# Patient Record
Sex: MALE | Race: WHITE | Employment: OTHER | URBAN - METROPOLITAN AREA
[De-identification: names, ages, dates, MRNs, and addresses within clinical notes are randomized per-mention and may not be internally consistent; named-entity substitution may affect disease eponyms.]

---

## 2019-09-24 ENCOUNTER — HOSPITAL ENCOUNTER (OUTPATIENT)
Dept: CT IMAGING | Age: 74
Discharge: HOME | End: 2019-09-24
Payer: MEDICARE

## 2019-09-24 ENCOUNTER — HOSPITAL ENCOUNTER (OUTPATIENT)
Age: 74
Discharge: HOME | End: 2019-09-24
Payer: MEDICARE

## 2019-09-24 DIAGNOSIS — I71.40 ABDOMINAL AORTIC ANEURYSM WITHOUT RUPTURE: ICD-10-CM

## 2019-09-24 LAB
BUN BLDV-MCNC: 11 MG/DL (ref 8–23)
CREAT SERPL-MCNC: 0.9 MG/DL (ref 0.7–1.2)
GFR AFRICAN AMERICAN: >60
GFR NON-AFRICAN AMERICAN: >60 ML/MIN/1.73

## 2019-09-24 PROCEDURE — 74174 CTA ABD&PLVS W/CONTRAST: CPT

## 2019-09-24 PROCEDURE — 82565 ASSAY OF CREATININE: CPT

## 2019-09-24 PROCEDURE — 36415 COLL VENOUS BLD VENIPUNCTURE: CPT

## 2019-09-24 PROCEDURE — 84520 ASSAY OF UREA NITROGEN: CPT

## 2019-09-24 PROCEDURE — 6360000004 HC RX CONTRAST MEDICATION: Performed by: RADIOLOGY

## 2019-09-24 RX ADMIN — IOPAMIDOL 80 ML: 755 INJECTION, SOLUTION INTRAVENOUS at 08:09

## 2021-03-02 ENCOUNTER — HOSPITAL ENCOUNTER (OUTPATIENT)
Dept: CT IMAGING | Age: 76
Discharge: HOME | End: 2021-03-02
Payer: MEDICARE

## 2021-03-02 ENCOUNTER — HOSPITAL ENCOUNTER (OUTPATIENT)
Age: 76
Discharge: HOME | End: 2021-03-02
Payer: MEDICARE

## 2021-03-02 DIAGNOSIS — I71.40 ABDOMINAL AORTIC ANEURYSM WITHOUT RUPTURE: ICD-10-CM

## 2021-03-02 LAB
BUN BLDV-MCNC: 12 MG/DL (ref 8–23)
CREAT SERPL-MCNC: 0.8 MG/DL (ref 0.7–1.2)
GFR AFRICAN AMERICAN: >60
GFR NON-AFRICAN AMERICAN: >60 ML/MIN/1.73

## 2021-03-02 PROCEDURE — 82565 ASSAY OF CREATININE: CPT

## 2021-03-02 PROCEDURE — 36415 COLL VENOUS BLD VENIPUNCTURE: CPT

## 2021-03-02 PROCEDURE — 74174 CTA ABD&PLVS W/CONTRAST: CPT

## 2021-03-02 PROCEDURE — 6360000004 HC RX CONTRAST MEDICATION: Performed by: RADIOLOGY

## 2021-03-02 PROCEDURE — 84520 ASSAY OF UREA NITROGEN: CPT

## 2021-03-02 RX ADMIN — IOPAMIDOL 75 ML: 755 INJECTION, SOLUTION INTRAVENOUS at 08:10

## 2022-04-18 ENCOUNTER — OFFICE VISIT (OUTPATIENT)
Dept: ORTHOPEDIC SURGERY | Age: 77
End: 2022-04-18
Payer: MEDICARE

## 2022-04-18 VITALS — WEIGHT: 150 LBS | HEIGHT: 66 IN | BODY MASS INDEX: 24.11 KG/M2 | TEMPERATURE: 98 F

## 2022-04-18 DIAGNOSIS — M25.561 ACUTE PAIN OF RIGHT KNEE: Primary | ICD-10-CM

## 2022-04-18 DIAGNOSIS — M17.11 PRIMARY OSTEOARTHRITIS OF RIGHT KNEE: ICD-10-CM

## 2022-04-18 PROCEDURE — 4004F PT TOBACCO SCREEN RCVD TLK: CPT | Performed by: ORTHOPAEDIC SURGERY

## 2022-04-18 PROCEDURE — 99203 OFFICE O/P NEW LOW 30 MIN: CPT | Performed by: ORTHOPAEDIC SURGERY

## 2022-04-18 PROCEDURE — G8420 CALC BMI NORM PARAMETERS: HCPCS | Performed by: ORTHOPAEDIC SURGERY

## 2022-04-18 PROCEDURE — 1123F ACP DISCUSS/DSCN MKR DOCD: CPT | Performed by: ORTHOPAEDIC SURGERY

## 2022-04-18 PROCEDURE — 20610 DRAIN/INJ JOINT/BURSA W/O US: CPT | Performed by: ORTHOPAEDIC SURGERY

## 2022-04-18 PROCEDURE — G8427 DOCREV CUR MEDS BY ELIG CLIN: HCPCS | Performed by: ORTHOPAEDIC SURGERY

## 2022-04-18 PROCEDURE — 4040F PNEUMOC VAC/ADMIN/RCVD: CPT | Performed by: ORTHOPAEDIC SURGERY

## 2022-04-18 RX ORDER — FELODIPINE 10 MG/1
TABLET, EXTENDED RELEASE ORAL
COMMUNITY
Start: 2022-03-06

## 2022-04-18 RX ORDER — TRIAMCINOLONE ACETONIDE 40 MG/ML
40 INJECTION, SUSPENSION INTRA-ARTICULAR; INTRAMUSCULAR ONCE
Status: COMPLETED | OUTPATIENT
Start: 2022-04-18 | End: 2022-04-18

## 2022-04-18 RX ORDER — CLOPIDOGREL BISULFATE 75 MG/1
TABLET ORAL
COMMUNITY
Start: 2022-03-06

## 2022-04-18 RX ORDER — OLMESARTAN MEDOXOMIL 20 MG/1
TABLET ORAL
COMMUNITY
Start: 2022-03-08

## 2022-04-18 RX ORDER — EZETIMIBE AND SIMVASTATIN 10; 20 MG/1; MG/1
TABLET ORAL
COMMUNITY
Start: 2022-03-17

## 2022-04-18 RX ADMIN — TRIAMCINOLONE ACETONIDE 40 MG: 40 INJECTION, SUSPENSION INTRA-ARTICULAR; INTRAMUSCULAR at 12:31

## 2022-04-18 NOTE — PROGRESS NOTES
Chief Complaint   Patient presents with    Knee Pain     Right Knee  x years, states of instability increasing       Subjective:     Patient ID: Laura Parnell is a 68 y.o..  male    Knee Pain  Patient complains of right knee pain. This is evaluated as a personal injury. There was not a history of injury. The pain began several years ago. The pain is located medial, lateral, anterior. He describes  His symptoms as aching and throbbing. He has experienced popping, clicking, locking, and giving way in the affected knee. The patient has had pain with kneeling, squating, and climbing stairs. Symptoms improve with rest, ice. The symptoms are worse with activity, stair climbing, kneeling, deep knee bending. The knee has given out or felt unstable. The patient cannot bend and straighten the knee fully. The patient is active in none. Treatment to date has been ice, NSAID's, without significant relief. The patient is not working. The patients occupation is retired. Patient did have a stroke 1994 that effected his right side. No past medical history on file. No past surgical history on file.     Current Outpatient Medications:     clopidogrel (PLAVIX) 75 MG tablet, TAKE 1 TABLET BY MOUTH ONCE DAILY, Disp: , Rfl:     ezetimibe-simvastatin (VYTORIN) 10-20 MG per tablet, TAKE 1 TABLET BY MOUTH ONCE DAILY, Disp: , Rfl:     felodipine (PLENDIL) 10 MG extended release tablet, TAKE 1 TABLET BY MOUTH ONCE DAILY, Disp: , Rfl:     olmesartan (BENICAR) 20 MG tablet, TAKE 1 TABLET BY MOUTH ONCE DAILY, Disp: , Rfl:   No Known Allergies  Social History     Socioeconomic History    Marital status:      Spouse name: Not on file    Number of children: Not on file    Years of education: Not on file    Highest education level: Not on file   Occupational History    Not on file   Tobacco Use    Smoking status: Not on file    Smokeless tobacco: Not on file   Substance and Sexual Activity    Alcohol use: Not on file    Drug use: Not on file    Sexual activity: Not on file   Other Topics Concern    Not on file   Social History Narrative    Not on file     Social Determinants of Health     Financial Resource Strain:     Difficulty of Paying Living Expenses: Not on file   Food Insecurity:     Worried About Running Out of Food in the Last Year: Not on file    [place] of Food in the Last Year: Not on file   Transportation Needs:     Lack of Transportation (Medical): Not on file    Lack of Transportation (Non-Medical): Not on file   Physical Activity:     Days of Exercise per Week: Not on file    Minutes of Exercise per Session: Not on file   Stress:     Feeling of Stress : Not on file   Social Connections:     Frequency of Communication with Friends and Family: Not on file    Frequency of Social Gatherings with Friends and Family: Not on file    Attends [employer] Services: Not on file    Active Member of [address] [place] [address] [employer] or Organizations: Not on file    Attends Club or Organization Meetings: Not on file    Marital Status: Not on file   Intimate Partner Violence:     Fear of Current or Ex-Partner: Not on file    Emotionally Abused: Not on file    Physically Abused: Not on file    Sexually Abused: Not on file   Housing Stability:     Unable to Pay for Housing in the Last Year: Not on file    Number of [place] in the Last Year: Not on file    Unstable Housing in the Last Year: Not on file     No family history on file. REVIEW OF SYSTEMS:     General/Constitution:  (-)weight loss, (-)fever, (-)chills, (-)weakness. Skin: (-) rash,(-) psoriasis,(-) eczema, (-)skin cancer. Musculoskeletal: (-) fractures,  (-) dislocations,(-) collagen vascular disease, (-) fibromyalgia, (-) multiple sclerosis, (-) muscular dystrophy, (-) RSD,(-) joint pain (-)swelling, (-) joint pain,swelling.   Neurologic: (-) epilepsy, (-)seizures,(-) brain tumor,(-) TIA, (-)stroke, (-)headaches, (-)Parkinson disease,(-) memory loss, (-) LOC.  Cardiovascular: (-) Chest pain, (-) swelling in legs/feet, (-) SOB, (-) cramping in legs/feet with walking. Respiratory: (-) SOB, (-) Coughing, (-) night sweats. GI: (-) nausea, (-) vomiting, (-) diarrhea, (-) blood in stool, (-) gastric ulcer. Psychiatric: (-) Depression, (-) Anxiety, (-) bipolar disease, (-) Alzheimer's Disease  Allergic/Immunologic: (-) allergies latex, (-) allergies metal, (-) skin sensitivity. Hematlogic: (-) anemia, (-) blood transfusion, (-) DVT/PE, (-) Clotting disorders    Subjective:    Constitution:  Temp 98 °F (36.7 °C)   Ht 5' 6\" (1.676 m)   Wt 150 lb (68 kg)   BMI 24.21 kg/m²     Psycihatric:  The patient is alert and oriented x 3, appears to be stated age and in no distress. Respiratory:  Respiratory effort is not labored. Patient is not gasping. Palpation of the chest reveals no tactile fremitus. Skin:  Upon inspection: the skin appears warm, dry and intact. There is  a previous scar over the affected area. There is any cellulitis, lymphedema or cutaneous lesions noted in the lower extremities. Upon palpation there is no induration noted. Neurologic:  Gait: antalgic; Motor exam of the lower extremities show ; quadriceps, hamstrings, foot dorsi and plantar flexors intact R.  5/5 and L. 5/5. Deep tendon reflexes are 2/4 at the knees and 2/4 at the ankles with strong extensor hallicus longus motor strength bilaterally. Sensory to both feet is intact to all sensory roots. Cardiovascular: The vascular exam is normal and is well perfused to distal extremities. Distal pulses DP/PT: R. 2+; L. 2+. There is cap refill noted less than two seconds in all digits. There is not edema of the bilateral lower extremities. There is not varicosities noted in the distal extremities. Lymph:  Upon palpation,  there is no lymphadenopathy noted in bilateral lower extremities.       Musculoskeletal:  Gait: antalgic; examination of the nails and digits reveal no cyanosis or clubbing. Lumbar exam:  On visual inspection, there is not deformity of the spine. full range of motion, no tenderness, palpable spasm or pain on motion. Special tests: Straight Leg Raise negative, Chong test negative. Hip exam:   Upon inspection, there is not deformity noted. Upon palpation there is not tenderness. ROM: is  full and symmetrical.   Strength: Hip Flexors 5/5; Hip Abductors 5/5; Hip Adduction 5/5. Knee exam:  Right knee exam shows;  range of motion of R. Knee is 0 to 125, and L. Knee is 0 to 130. The patient does have  pain on motion, effusion is mild, there is tenderness over the  medial, lateral, anterior, patellar region, there are not any masses, there is not ligamentous instability, there is  deformity noted. Knee exam: right positive for moderate crepitations, some mild tenderness laxity is not noted with stress. There is not a popliteal cyst. Muscle testing 4/5 right LE. R. Knee:  Lachman's negative, Anterior Drawer negative, Posterior Drawer negative  Justin's negative, Thallasy  negative,   PF grind test negative, Apprehension test negative, Patellar J sign  negative  L. Knee:  Lachman's negative, Anterior Drawer negative, Posterior Drawer negative  Justin's negative, Thallasy  negative,   PF grind test negative, Apprehension test negative,  Patellar J sign  negative    Xray Exam:  Four views the right knee were obtained.  There is no acute fracture   dislocation right knee.  There are mild degenerative changes right knee. There is no osseous lesion.  On the lateral view there is no joint effusion. No soft tissue abnormalities noted       Radiographic findings reviewed with patient    Assessment:  Encounter Diagnoses   Name Primary?  Acute pain of right knee Yes    Primary osteoarthritis of right knee        Plan:  Natural history and expected course discussed. Questions answered. Educational materials distributed.   I had a lengthy discussion with the patient regarding their diagnosis. I explained treatment options including surgical vs non surgical treatment. I reviewed in detail the risks and benefits and outlined the procedure in detail with expected outcomes and possible complications. I also discussed non surgical treatment such as injections (CSI and visco supplementation), physical therapy, topical creams and NSAID's. They have elected for conservative management at this time. I will proceed with a cortisone injection in the Right knee. Verbal and written consent was obtained for the injections. The skin was prepped with alcohol. 1mL of Kenalog 40mg and 9mL of 0.25% Marcaine was  injected to Right knee. The injection was given through the lateral side of the knee. The patient tolerated the injection well. I will see the patient back prn. At least 30 minutes was spent discussing the diagnosis and treatment options with the patient with at least 50% of the time was spent with decision making and counseling the patient.

## 2022-08-15 ENCOUNTER — OFFICE VISIT (OUTPATIENT)
Dept: ORTHOPEDIC SURGERY | Age: 77
End: 2022-08-15
Payer: MEDICARE

## 2022-08-15 VITALS — BODY MASS INDEX: 24.11 KG/M2 | TEMPERATURE: 98 F | HEIGHT: 66 IN | WEIGHT: 150 LBS

## 2022-08-15 DIAGNOSIS — M17.11 PRIMARY OSTEOARTHRITIS OF RIGHT KNEE: Primary | ICD-10-CM

## 2022-08-15 PROCEDURE — 20610 DRAIN/INJ JOINT/BURSA W/O US: CPT | Performed by: ORTHOPAEDIC SURGERY

## 2022-08-15 PROCEDURE — 99213 OFFICE O/P EST LOW 20 MIN: CPT | Performed by: ORTHOPAEDIC SURGERY

## 2022-08-15 PROCEDURE — 1123F ACP DISCUSS/DSCN MKR DOCD: CPT | Performed by: ORTHOPAEDIC SURGERY

## 2022-08-16 RX ORDER — TRIAMCINOLONE ACETONIDE 40 MG/ML
40 INJECTION, SUSPENSION INTRA-ARTICULAR; INTRAMUSCULAR ONCE
Status: COMPLETED | OUTPATIENT
Start: 2022-08-16 | End: 2022-08-16

## 2022-08-16 RX ADMIN — TRIAMCINOLONE ACETONIDE 40 MG: 40 INJECTION, SUSPENSION INTRA-ARTICULAR; INTRAMUSCULAR at 12:31

## 2022-08-16 NOTE — PROGRESS NOTES
Chief Complaint   Patient presents with    Knee Pain     Right knee pain wants cortisone injection       Subjective:     Patient ID: Andra Russell is a 68 y.o..  male    Knee Pain  Patient complains of right knee pain. He has had csi with good relief    No past medical history on file. No past surgical history on file. Current Outpatient Medications:     clopidogrel (PLAVIX) 75 MG tablet, TAKE 1 TABLET BY MOUTH ONCE DAILY, Disp: , Rfl:     ezetimibe-simvastatin (VYTORIN) 10-20 MG per tablet, TAKE 1 TABLET BY MOUTH ONCE DAILY, Disp: , Rfl:     felodipine (PLENDIL) 10 MG extended release tablet, TAKE 1 TABLET BY MOUTH ONCE DAILY, Disp: , Rfl:     olmesartan (BENICAR) 20 MG tablet, TAKE 1 TABLET BY MOUTH ONCE DAILY, Disp: , Rfl:   No Known Allergies  Social History     Socioeconomic History    Marital status:      Spouse name: Not on file    Number of children: Not on file    Years of education: Not on file    Highest education level: Not on file   Occupational History    Not on file   Tobacco Use    Smoking status: Not on file    Smokeless tobacco: Not on file   Substance and Sexual Activity    Alcohol use: Not on file    Drug use: Not on file    Sexual activity: Not on file   Other Topics Concern    Not on file   Social History Narrative    Not on file     Social Determinants of Health     Financial Resource Strain: Not on file   Food Insecurity: Not on file   Transportation Needs: Not on file   Physical Activity: Not on file   Stress: Not on file   Social Connections: Not on file   Intimate Partner Violence: Not on file   Housing Stability: Not on file     No family history on file. REVIEW OF SYSTEMS:     General/Constitution:  (-)weight loss, (-)fever, (-)chills, (-)weakness. Skin: (-) rash,(-) psoriasis,(-) eczema, (-)skin cancer.    Musculoskeletal: (-) fractures,  (-) dislocations,(-) collagen vascular disease, (-) fibromyalgia, (-) multiple sclerosis, (-) muscular dystrophy, (-) RSD,(-) joint pain (-)swelling, (-) joint pain,swelling. Neurologic: (-) epilepsy, (-)seizures,(-) brain tumor,(-) TIA, (-)stroke, (-)headaches, (-)Parkinson disease,(-) memory loss, (-) LOC. Cardiovascular: (-) Chest pain, (-) swelling in legs/feet, (-) SOB, (-) cramping in legs/feet with walking. Respiratory: (-) SOB, (-) Coughing, (-) night sweats. GI: (-) nausea, (-) vomiting, (-) diarrhea, (-) blood in stool, (-) gastric ulcer. Psychiatric: (-) Depression, (-) Anxiety, (-) bipolar disease, (-) Alzheimer's Disease  Allergic/Immunologic: (-) allergies latex, (-) allergies metal, (-) skin sensitivity. Hematlogic: (-) anemia, (-) blood transfusion, (-) DVT/PE, (-) Clotting disorders    Subjective:    Constitution:  Temp 98 °F (36.7 °C)   Ht 5' 6\" (1.676 m)   Wt 150 lb (68 kg)   BMI 24.21 kg/m²     Psycihatric:  The patient is alert and oriented x 3, appears to be stated age and in no distress. Respiratory:  Respiratory effort is not labored. Patient is not gasping. Palpation of the chest reveals no tactile fremitus. Skin:  Upon inspection: the skin appears warm, dry and intact. There is  a previous scar over the affected area. There is any cellulitis, lymphedema or cutaneous lesions noted in the lower extremities. Upon palpation there is no induration noted. Neurologic:  Gait: antalgic; Motor exam of the lower extremities show ; quadriceps, hamstrings, foot dorsi and plantar flexors intact R.  5/5 and L. 5/5. Deep tendon reflexes are 2/4 at the knees and 2/4 at the ankles with strong extensor hallicus longus motor strength bilaterally. Sensory to both feet is intact to all sensory roots. Cardiovascular: The vascular exam is normal and is well perfused to distal extremities. Distal pulses DP/PT: R. 2+; L. 2+. There is cap refill noted less than two seconds in all digits. There is not edema of the bilateral lower extremities. There is not varicosities noted in the distal extremities. Lymph:  Upon palpation,  there is no lymphadenopathy noted in bilateral lower extremities. Musculoskeletal:  Gait: antalgic; examination of the nails and digits reveal no cyanosis or clubbing. Lumbar exam:  On visual inspection, there is not deformity of the spine. full range of motion, no tenderness, palpable spasm or pain on motion. Special tests: Straight Leg Raise negative, Chong test negative. Hip exam:   Upon inspection, there is not deformity noted. Upon palpation there is not tenderness. ROM: is  full and symmetrical.   Strength: Hip Flexors 5/5; Hip Abductors 5/5; Hip Adduction 5/5. Knee exam:  Right knee exam shows;  range of motion of R. Knee is 0 to 125, and L. Knee is 0 to 130. The patient does have  pain on motion, effusion is mild, there is tenderness over the  medial, lateral, anterior, patellar region, there are not any masses, there is not ligamentous instability, there is  deformity noted. Knee exam: right positive for moderate crepitations, some mild tenderness laxity is not noted with stress. There is not a popliteal cyst. Muscle testing 4/5 right LE. R. Knee:  Lachman's negative, Anterior Drawer negative, Posterior Drawer negative  Justin's negative, Thallasy  negative,   PF grind test negative, Apprehension test negative, Patellar J sign  negative  L. Knee:  Lachman's negative, Anterior Drawer negative, Posterior Drawer negative  Justin's negative, Thallasy  negative,   PF grind test negative, Apprehension test negative,  Patellar J sign  negative    Xray Exam:  Four views the right knee were obtained. There is no acute fracture   dislocation right knee. There are mild degenerative changes right knee. There is no osseous lesion. On the lateral view there is no joint effusion. No soft tissue abnormalities noted       Radiographic findings reviewed with patient    Assessment:  Encounter Diagnoses   Name Primary?     Primary osteoarthritis of right knee Yes       Plan:  Natural history and expected course discussed. Questions answered. Educational materials distributed. I had a lengthy discussion with the patient regarding their diagnosis. I explained treatment options including surgical vs non surgical treatment. I reviewed in detail the risks and benefits and outlined the procedure in detail with expected outcomes and possible complications. I also discussed non surgical treatment such as injections (CSI and visco supplementation), physical therapy, topical creams and NSAID's. They have elected for conservative management at this time. I will proceed with a cortisone injection in the Right knee. Verbal and written consent was obtained for the injections. The skin was prepped with alcohol. 1mL of Kenalog 40mg and 9mL of 0.25% Marcaine was  injected to Right knee. The injection was given through the lateral side of the knee. The patient tolerated the injection well. I will see the patient back prn.

## 2022-11-15 ENCOUNTER — OFFICE VISIT (OUTPATIENT)
Dept: ORTHOPEDIC SURGERY | Age: 77
End: 2022-11-15

## 2022-11-15 VITALS — BODY MASS INDEX: 24.11 KG/M2 | TEMPERATURE: 98 F | WEIGHT: 150 LBS | HEIGHT: 66 IN

## 2022-11-15 DIAGNOSIS — M17.11 PRIMARY OSTEOARTHRITIS OF RIGHT KNEE: Primary | ICD-10-CM

## 2022-11-16 RX ORDER — TRIAMCINOLONE ACETONIDE 40 MG/ML
40 INJECTION, SUSPENSION INTRA-ARTICULAR; INTRAMUSCULAR ONCE
Status: SHIPPED | OUTPATIENT
Start: 2022-11-16

## 2022-11-16 NOTE — PROGRESS NOTES
Chief Complaint   Patient presents with    Knee Pain     Right knee pain wants cortisone injection today. Robb Oliver returns today for follow-up of his right knee pain. he reports this is worse than when I saw him last.  The patient's pain level is a 7/10. The previous treatment was successful. No past medical history on file. No past surgical history on file. Current Outpatient Medications:     clopidogrel (PLAVIX) 75 MG tablet, TAKE 1 TABLET BY MOUTH ONCE DAILY, Disp: , Rfl:     ezetimibe-simvastatin (VYTORIN) 10-20 MG per tablet, TAKE 1 TABLET BY MOUTH ONCE DAILY, Disp: , Rfl:     felodipine (PLENDIL) 10 MG extended release tablet, TAKE 1 TABLET BY MOUTH ONCE DAILY, Disp: , Rfl:     olmesartan (BENICAR) 20 MG tablet, TAKE 1 TABLET BY MOUTH ONCE DAILY, Disp: , Rfl:   No Known Allergies  Social History     Socioeconomic History    Marital status:      Spouse name: Not on file    Number of children: Not on file    Years of education: Not on file    Highest education level: Not on file   Occupational History    Not on file   Tobacco Use    Smoking status: Not on file    Smokeless tobacco: Not on file   Substance and Sexual Activity    Alcohol use: Not on file    Drug use: Not on file    Sexual activity: Not on file   Other Topics Concern    Not on file   Social History Narrative    Not on file     Social Determinants of Health     Financial Resource Strain: Not on file   Food Insecurity: Not on file   Transportation Needs: Not on file   Physical Activity: Not on file   Stress: Not on file   Social Connections: Not on file   Intimate Partner Violence: Not on file   Housing Stability: Not on file     No family history on file. Review of Systems:     Skin: (-) rash,(-) psoriasis,(-) eczema, (-)skin cancer.    Musculoskeletal: (-) fractures,  (-) dislocations,(-) collagen vascular disease, (-) fibromyalgia, (-) multiple sclerosis, (-) muscular dystrophy, (-) RSD,(-) joint pain (-)swelling, (-) joint pain,swelling. Neurologic: (-) epilepsy, (-)seizures,(-) brain tumor,(-) TIA, (-)stroke, (-)headaches, (-)Parkinson disease,(-) memory loss, (-) LOC. Cardiovascular: (-) Chest pain, (-) swelling in legs/feet, (-) SOB, (-) cramping in legs/feet with walking. Constitutional:  The patient is alert and oriented x 3, appears to be stated age and in no distress. Temp 98 °F (36.7 °C)   Ht 5' 6\" (1.676 m)   Wt 150 lb (68 kg)   BMI 24.21 kg/m²     Skin:  Upon inspection: the skin appears warm, dry and intact. There is not a previous scar over the affected area. There is not any cellulitis, lymphedema or cutaneous lesions noted in the lower extremities. Upon palpation there is no induration noted. Neurologic:  Gait: normal;  Motor exam of the lower extremities show ; quadriceps, hamstrings, foot dorsi and plantar flexors intact R.  5/5 and L. 5/5. Deep tendon reflexes are 2/4 at the knees and 2/4 at the ankles with strong extensor hallicus longus motor strength bilaterally. Sensory to both feet is intact to all sensory roots. Cardiovascular: The vascular exam is normal and is well perfused to distal extremities. Distal pulses DP/PT: R. 2+; L. 2+. There is cap refill noted less than two seconds in all digits. There is not edema of the bilateral lower extremities. There is not varicosities noted in the distal extremities. Lymph:  Upon palpation,  there is no lymphadenopathy noted in bilateral lower extremities. Musculoskeletal:  Gait: antalgic; examination of the nails and digits reveal no cyanosis or clubbing    Lumbar exam:  On visual inspection, there is no deformity of the spine. full range of motion, no tenderness, palpable spasm or pain on motion. Special tests: Straight Leg Raise negative, Chong testnegative. Hip exam:  Upon inspection, there is no deformity noted. Upon palpation there is not tenderness.   ROM: is   full and semetrical.   Strength: Hip Flexors 5/5; Hip Abductors 5/5; Hip Adduction 5/5. Knee exam:  Right knee exam shows;  range of motion of R. Knee is 0 to 120, and L. Knee is 0 to 125. He does have  pain on motion, effusion is mild, there is tenderness over the  medial region, there are not any masses, there is not ligamentous instability, there is  deformity noted. Knee exam: bilateral positive for moderate crepitations, some mild tenderness laxity is not noted with  stress. R. Knee:  Lachman's negative, Anterior Drawer negative, Posterior Drawer negative  Justin's positive, Thallasy  positive,   PF grind test positive, Apprehension test negative, Patellar J sign  negative  L. Knee:  Lachman's negative, Anterior Drawer negative, Posterior Drawer negative  Justin's negative, Thallasy  negative,   PF grind test negative, Apprehension test negative,  Patellar J sign  negative    Xrays:   Djd knee     MRI:   none  Radiographic findings reviewed with patient    Impression:  Encounter Diagnosis   Name Primary? Primary osteoarthritis of right knee Yes       Plan:   Natural history and expected course discussed. Questions answered. Educational materials distributed. Rest, ice, compression, and elevation (RICE) therapy. Reduction in offending activity. Patellar compression sleeve. Straight leg brace. I will proceed with a cortisone injection in the Right knee. Verbal and written consent was obtained for the injections. The skin was prepped with alcohol. 1mL of Kenalog 40mg and 9mL of 0.25% Marcaine was  injected to Right knee. The injection was given through the lateral side of the knee. The patient tolerated the injection well.  I will see the patient back 3 months

## 2023-03-16 ENCOUNTER — OFFICE VISIT (OUTPATIENT)
Dept: ORTHOPEDIC SURGERY | Age: 78
End: 2023-03-16

## 2023-03-16 VITALS — BODY MASS INDEX: 22.5 KG/M2 | WEIGHT: 140 LBS | TEMPERATURE: 98 F | HEIGHT: 66 IN

## 2023-03-16 DIAGNOSIS — M17.11 PRIMARY OSTEOARTHRITIS OF RIGHT KNEE: Primary | ICD-10-CM

## 2023-03-16 NOTE — PROGRESS NOTES
exam:  Right knee exam shows;  range of motion of R. Knee is 0 to 120, and L. Knee is 0 to 125. He does have  pain on motion, effusion is mild, there is tenderness over the  medial region, there are not any masses, there is not ligamentous instability, there is  deformity noted. Knee exam: bilateral positive for moderate crepitations, some mild tenderness laxity is not noted with  stress. R. Knee:  Lachman's negative, Anterior Drawer negative, Posterior Drawer negative  Justin's positive, Thallasy  positive,   PF grind test positive, Apprehension test negative, Patellar J sign  negative  L. Knee:  Lachman's negative, Anterior Drawer negative, Posterior Drawer negative  Justin's negative, Thallasy  negative,   PF grind test negative, Apprehension test negative,  Patellar J sign  negative    Xrays:   Djd knee     MRI:   none  Radiographic findings reviewed with patient    Impression:  Encounter Diagnosis   Name Primary? Primary osteoarthritis of right knee Yes       Plan:   Natural history and expected course discussed. Questions answered. Educational materials distributed. Rest, ice, compression, and elevation (RICE) therapy. Reduction in offending activity. Patellar compression sleeve. Straight leg brace. I will proceed with a cortisone injection in the Right knee. Verbal and written consent was obtained for the injections. The skin was prepped with alcohol. 1mL of Kenalog 40mg and 9mL of 0.25% Marcaine was  injected to Right knee. The injection was given through the lateral side of the knee. The patient tolerated the injection well.  I will see the patient back 3 months

## 2023-03-22 RX ORDER — TRIAMCINOLONE ACETONIDE 40 MG/ML
40 INJECTION, SUSPENSION INTRA-ARTICULAR; INTRAMUSCULAR ONCE
Status: COMPLETED | OUTPATIENT
Start: 2023-03-22 | End: 2023-03-22

## 2023-03-22 RX ADMIN — TRIAMCINOLONE ACETONIDE 40 MG: 40 INJECTION, SUSPENSION INTRA-ARTICULAR; INTRAMUSCULAR at 13:35

## 2023-06-19 ENCOUNTER — OFFICE VISIT (OUTPATIENT)
Dept: ORTHOPEDIC SURGERY | Age: 78
End: 2023-06-19
Payer: MEDICARE

## 2023-06-19 VITALS — BODY MASS INDEX: 22.5 KG/M2 | HEIGHT: 66 IN | TEMPERATURE: 98 F | WEIGHT: 140 LBS

## 2023-06-19 DIAGNOSIS — M17.11 PRIMARY OSTEOARTHRITIS OF RIGHT KNEE: Primary | ICD-10-CM

## 2023-06-19 PROCEDURE — 20610 DRAIN/INJ JOINT/BURSA W/O US: CPT | Performed by: ORTHOPAEDIC SURGERY

## 2023-06-19 PROCEDURE — 1123F ACP DISCUSS/DSCN MKR DOCD: CPT | Performed by: ORTHOPAEDIC SURGERY

## 2023-06-19 PROCEDURE — 99213 OFFICE O/P EST LOW 20 MIN: CPT | Performed by: ORTHOPAEDIC SURGERY

## 2023-06-19 RX ORDER — TRIAMCINOLONE ACETONIDE 40 MG/ML
40 INJECTION, SUSPENSION INTRA-ARTICULAR; INTRAMUSCULAR ONCE
Status: COMPLETED | OUTPATIENT
Start: 2023-06-19 | End: 2023-06-19

## 2023-06-19 RX ADMIN — TRIAMCINOLONE ACETONIDE 40 MG: 40 INJECTION, SUSPENSION INTRA-ARTICULAR; INTRAMUSCULAR at 10:58

## 2023-06-19 NOTE — PROGRESS NOTES
range of motion of R. Knee is 0 to 120, and L. Knee is 0 to 125. He does have  pain on motion, effusion is mild, there is tenderness over the  medial region, there are not any masses, there is not ligamentous instability, there is  deformity noted. Knee exam: bilateral positive for moderate crepitations, some mild tenderness laxity is not noted with  stress. R. Knee:  Lachman's negative, Anterior Drawer negative, Posterior Drawer negative  Justin's positive, Thallasy  positive,   PF grind test positive, Apprehension test negative, Patellar J sign  negative  L. Knee:  Lachman's negative, Anterior Drawer negative, Posterior Drawer negative  Justin's negative, Thallasy  negative,   PF grind test negative, Apprehension test negative,  Patellar J sign  negative    Xrays:   Djd knee     MRI:   none  Radiographic findings reviewed with patient    Impression:  Encounter Diagnosis   Name Primary? Primary osteoarthritis of right knee Yes       Plan:   Natural history and expected course discussed. Questions answered. Educational materials distributed. Rest, ice, compression, and elevation (RICE) therapy. Reduction in offending activity. Patellar compression sleeve. Straight leg brace. I will proceed with a cortisone injection in the Right knee. Verbal and written consent was obtained for the injections. The skin was prepped with alcohol. 1mL of Kenalog 40mg and 9mL of 0.25% Marcaine was  injected to Right knee. The injection was given through the lateral side of the knee. The patient tolerated the injection well.  I will see the patient back 3 months

## 2023-09-19 ENCOUNTER — OFFICE VISIT (OUTPATIENT)
Dept: ORTHOPEDIC SURGERY | Age: 78
End: 2023-09-19
Payer: MEDICARE

## 2023-09-19 VITALS — TEMPERATURE: 98 F | HEIGHT: 66 IN | WEIGHT: 140 LBS | BODY MASS INDEX: 22.5 KG/M2

## 2023-09-19 DIAGNOSIS — M17.11 PRIMARY OSTEOARTHRITIS OF RIGHT KNEE: Primary | ICD-10-CM

## 2023-09-19 PROCEDURE — 20610 DRAIN/INJ JOINT/BURSA W/O US: CPT | Performed by: ORTHOPAEDIC SURGERY

## 2023-09-19 PROCEDURE — 1123F ACP DISCUSS/DSCN MKR DOCD: CPT | Performed by: ORTHOPAEDIC SURGERY

## 2023-09-19 PROCEDURE — 99213 OFFICE O/P EST LOW 20 MIN: CPT | Performed by: ORTHOPAEDIC SURGERY

## 2023-09-19 RX ORDER — TRIAMCINOLONE ACETONIDE 40 MG/ML
40 INJECTION, SUSPENSION INTRA-ARTICULAR; INTRAMUSCULAR ONCE
Status: COMPLETED | OUTPATIENT
Start: 2023-09-19 | End: 2023-09-19

## 2023-09-19 RX ADMIN — TRIAMCINOLONE ACETONIDE 40 MG: 40 INJECTION, SUSPENSION INTRA-ARTICULAR; INTRAMUSCULAR at 10:10

## 2023-09-19 NOTE — PROGRESS NOTES
Chief Complaint   Patient presents with    Knee Pain     Right Knee. Pt has 4/10 pain at the time of visit. Pt has minor swelling from time to time in the Right Knee. Pt has instability with ambulation with posterior translation as described by the patient. Jeniffer Vega returns today for follow-up of his right knee pain. he reports this is worse than when I saw him last.  The patient's pain level is a 4/10. No past medical history on file. No past surgical history on file. Current Outpatient Medications:     clopidogrel (PLAVIX) 75 MG tablet, TAKE 1 TABLET BY MOUTH ONCE DAILY, Disp: , Rfl:     ezetimibe-simvastatin (VYTORIN) 10-20 MG per tablet, TAKE 1 TABLET BY MOUTH ONCE DAILY, Disp: , Rfl:     felodipine (PLENDIL) 10 MG extended release tablet, TAKE 1 TABLET BY MOUTH ONCE DAILY, Disp: , Rfl:     olmesartan (BENICAR) 20 MG tablet, TAKE 1 TABLET BY MOUTH ONCE DAILY, Disp: , Rfl:   No Known Allergies  Social History     Socioeconomic History    Marital status:      Spouse name: Not on file    Number of children: Not on file    Years of education: Not on file    Highest education level: Not on file   Occupational History    Not on file   Tobacco Use    Smoking status: Not on file    Smokeless tobacco: Not on file   Substance and Sexual Activity    Alcohol use: Not on file    Drug use: Not on file    Sexual activity: Not on file   Other Topics Concern    Not on file   Social History Narrative    Not on file     Social Determinants of Health     Financial Resource Strain: Not on file   Food Insecurity: Not on file   Transportation Needs: Not on file   Physical Activity: Not on file   Stress: Not on file   Social Connections: Not on file   Intimate Partner Violence: Not on file   Housing Stability: Not on file     No family history on file. Review of Systems:     Skin: (-) rash,(-) psoriasis,(-) eczema, (-)skin cancer.    Musculoskeletal: (-) fractures,  (-) dislocations,(-) collagen vascular

## 2024-03-19 ENCOUNTER — OFFICE VISIT (OUTPATIENT)
Dept: ORTHOPEDIC SURGERY | Age: 79
End: 2024-03-19
Payer: MEDICARE

## 2024-03-19 VITALS — BODY MASS INDEX: 22.5 KG/M2 | HEIGHT: 66 IN | TEMPERATURE: 98 F | WEIGHT: 140 LBS

## 2024-03-19 DIAGNOSIS — M17.11 PRIMARY OSTEOARTHRITIS OF RIGHT KNEE: Primary | ICD-10-CM

## 2024-03-19 PROCEDURE — 20610 DRAIN/INJ JOINT/BURSA W/O US: CPT | Performed by: ORTHOPAEDIC SURGERY

## 2024-03-19 PROCEDURE — 1123F ACP DISCUSS/DSCN MKR DOCD: CPT | Performed by: ORTHOPAEDIC SURGERY

## 2024-03-19 PROCEDURE — 99213 OFFICE O/P EST LOW 20 MIN: CPT | Performed by: ORTHOPAEDIC SURGERY

## 2024-03-19 RX ORDER — TRIAMCINOLONE ACETONIDE 40 MG/ML
40 INJECTION, SUSPENSION INTRA-ARTICULAR; INTRAMUSCULAR ONCE
Status: COMPLETED | OUTPATIENT
Start: 2024-03-19 | End: 2024-03-19

## 2024-03-19 RX ADMIN — TRIAMCINOLONE ACETONIDE 40 MG: 40 INJECTION, SUSPENSION INTRA-ARTICULAR; INTRAMUSCULAR at 09:08

## 2024-03-19 NOTE — PROGRESS NOTES
semetrical.   Strength: Hip Flexors 5/5; Hip Abductors 5/5; Hip Adduction 5/5.     Knee exam:  Right knee exam shows;  range of motion of R. Knee is 0 to 120, and L. Knee is 0 to 125. He does have  pain on motion, effusion is mild, there is tenderness over the  medial region, there are not any masses, there is not ligamentous instability, there is  deformity noted. Knee exam: bilateral positive for moderate crepitations, some mild tenderness laxity is not noted with  stress.      R. Knee:  Lachman's negative, Anterior Drawer negative, Posterior Drawer negative  Justin's positive, Thallasy  positive,   PF grind test positive, Apprehension test negative, Patellar J sign  negative  L. Knee:  Lachman's negative, Anterior Drawer negative, Posterior Drawer negative  Justin's negative, Thallasy  negative,   PF grind test negative, Apprehension test negative,  Patellar J sign  negative    Xrays:   Djd knee     MRI:   none  Radiographic findings reviewed with patient    Impression:  Encounter Diagnosis   Name Primary?    Primary osteoarthritis of right knee Yes           Plan:   I had a lengthy discussion with the patient regarding their diagnosis. I explained treatment options including surgical vs non surgical treatment. I reviewed in detail the risks and benefits and outlined the procedure in detail with expected outcomes and possible complications.  I also discussed non surgical treatment such as injections (CSI and visco supplementation), physical therapy, topical creams and NSAID's. They have elected for conservative management at this time.        I will proceed with a cortisone injection in the Right knee.  Verbal and written consent was obtained for the injections. The skin was prepped with alcohol. 1mL of Kenalog 40mg and 9mL of 0.25% Marcaine was  injected to Right knee. The injection was given through the lateral side of the knee. The patient tolerated the injection well. I will see the patient back 3 months

## 2024-06-20 ENCOUNTER — OFFICE VISIT (OUTPATIENT)
Dept: ORTHOPEDIC SURGERY | Age: 79
End: 2024-06-20
Payer: MEDICARE

## 2024-06-20 VITALS — BODY MASS INDEX: 22.5 KG/M2 | HEIGHT: 66 IN | TEMPERATURE: 98 F | WEIGHT: 140 LBS

## 2024-06-20 DIAGNOSIS — M17.11 PRIMARY OSTEOARTHRITIS OF RIGHT KNEE: Primary | ICD-10-CM

## 2024-06-20 PROCEDURE — 99213 OFFICE O/P EST LOW 20 MIN: CPT | Performed by: ORTHOPAEDIC SURGERY

## 2024-06-20 PROCEDURE — 20610 DRAIN/INJ JOINT/BURSA W/O US: CPT | Performed by: ORTHOPAEDIC SURGERY

## 2024-06-20 PROCEDURE — 1123F ACP DISCUSS/DSCN MKR DOCD: CPT | Performed by: ORTHOPAEDIC SURGERY

## 2024-06-28 ENCOUNTER — TRANSCRIBE ORDERS (OUTPATIENT)
Dept: ADMINISTRATIVE | Age: 79
End: 2024-06-28

## 2024-06-28 DIAGNOSIS — I71.40 ABDOMINAL AORTIC ANEURYSM (AAA) WITHOUT RUPTURE, UNSPECIFIED PART (HCC): Primary | ICD-10-CM

## 2024-06-28 RX ORDER — TRIAMCINOLONE ACETONIDE 40 MG/ML
40 INJECTION, SUSPENSION INTRA-ARTICULAR; INTRAMUSCULAR ONCE
Status: COMPLETED | OUTPATIENT
Start: 2024-06-28 | End: 2024-06-28

## 2024-06-28 RX ADMIN — TRIAMCINOLONE ACETONIDE 40 MG: 40 INJECTION, SUSPENSION INTRA-ARTICULAR; INTRAMUSCULAR at 13:38

## 2024-06-28 NOTE — PROGRESS NOTES
Chief Complaint   Patient presents with    Knee Pain     Right knee 3 month f/u       Kwankey Dakin returns today for follow-up of his right knee pain. He has difficulties performing ADL's.      No past medical history on file.  No past surgical history on file.    Current Outpatient Medications:     clopidogrel (PLAVIX) 75 MG tablet, TAKE 1 TABLET BY MOUTH ONCE DAILY, Disp: , Rfl:     ezetimibe-simvastatin (VYTORIN) 10-20 MG per tablet, TAKE 1 TABLET BY MOUTH ONCE DAILY, Disp: , Rfl:     felodipine (PLENDIL) 10 MG extended release tablet, TAKE 1 TABLET BY MOUTH ONCE DAILY, Disp: , Rfl:     olmesartan (BENICAR) 20 MG tablet, TAKE 1 TABLET BY MOUTH ONCE DAILY, Disp: , Rfl:   No Known Allergies  Social History     Socioeconomic History    Marital status:      Spouse name: Not on file    Number of children: Not on file    Years of education: Not on file    Highest education level: Not on file   Occupational History    Not on file   Tobacco Use    Smoking status: Not on file    Smokeless tobacco: Not on file   Substance and Sexual Activity    Alcohol use: Not on file    Drug use: Not on file    Sexual activity: Not on file   Other Topics Concern    Not on file   Social History Narrative    Not on file     Social Determinants of Health     Financial Resource Strain: Not on file   Food Insecurity: Not on file   Transportation Needs: Not on file   Physical Activity: Not on file   Stress: Not on file   Social Connections: Not on file   Intimate Partner Violence: Not on file   Housing Stability: Not on file     No family history on file.    Review of Systems:     Skin: (-) rash,(-) psoriasis,(-) eczema, (-)skin cancer.   Musculoskeletal: (-) fractures,  (-) dislocations,(-) collagen vascular disease, (-) fibromyalgia, (-) multiple sclerosis, (-) muscular dystrophy, (-) RSD,(-) joint pain (-)swelling, (-) joint pain,swelling.  Neurologic: (-) epilepsy, (-)seizures,(-) brain tumor,(-) TIA, (-)stroke, (-)headaches,

## 2024-08-05 ENCOUNTER — HOSPITAL ENCOUNTER (OUTPATIENT)
Age: 79
Discharge: HOME | End: 2024-08-05
Payer: MEDICARE

## 2024-08-05 LAB
BUN SERPL-MCNC: 6 MG/DL (ref 6–23)
CREAT SERPL-MCNC: 0.6 MG/DL (ref 0.7–1.2)
GFR, ESTIMATED: >90 ML/MIN/1.73M2

## 2024-08-05 PROCEDURE — 84520 ASSAY OF UREA NITROGEN: CPT

## 2024-08-05 PROCEDURE — 82565 ASSAY OF CREATININE: CPT

## 2024-08-05 PROCEDURE — 36415 COLL VENOUS BLD VENIPUNCTURE: CPT

## 2024-08-09 ENCOUNTER — HOSPITAL ENCOUNTER (OUTPATIENT)
Dept: CT IMAGING | Age: 79
Discharge: HOME | End: 2024-08-09
Attending: THORACIC SURGERY (CARDIOTHORACIC VASCULAR SURGERY)
Payer: MEDICARE

## 2024-08-09 DIAGNOSIS — I71.40 ABDOMINAL AORTIC ANEURYSM (AAA) WITHOUT RUPTURE, UNSPECIFIED PART (HCC): ICD-10-CM

## 2024-08-09 PROCEDURE — 6360000004 HC RX CONTRAST MEDICATION: Performed by: RADIOLOGY

## 2024-08-09 PROCEDURE — 74174 CTA ABD&PLVS W/CONTRAST: CPT

## 2024-08-09 RX ADMIN — IOPAMIDOL 75 ML: 755 INJECTION, SOLUTION INTRAVENOUS at 08:22

## 2024-09-23 ENCOUNTER — OFFICE VISIT (OUTPATIENT)
Dept: ORTHOPEDIC SURGERY | Age: 79
End: 2024-09-23
Payer: MEDICARE

## 2024-09-23 VITALS — WEIGHT: 140 LBS | BODY MASS INDEX: 22.5 KG/M2 | HEIGHT: 66 IN

## 2024-09-23 DIAGNOSIS — M17.11 PRIMARY OSTEOARTHRITIS OF RIGHT KNEE: Primary | ICD-10-CM

## 2024-09-23 PROCEDURE — 1123F ACP DISCUSS/DSCN MKR DOCD: CPT | Performed by: ORTHOPAEDIC SURGERY

## 2024-09-23 PROCEDURE — 99213 OFFICE O/P EST LOW 20 MIN: CPT | Performed by: ORTHOPAEDIC SURGERY

## 2024-09-23 PROCEDURE — 20610 DRAIN/INJ JOINT/BURSA W/O US: CPT | Performed by: ORTHOPAEDIC SURGERY

## 2024-09-23 RX ORDER — TRIAMCINOLONE ACETONIDE 40 MG/ML
40 INJECTION, SUSPENSION INTRA-ARTICULAR; INTRAMUSCULAR ONCE
Status: COMPLETED | OUTPATIENT
Start: 2024-09-23 | End: 2024-09-23

## 2024-09-23 RX ADMIN — TRIAMCINOLONE ACETONIDE 40 MG: 40 INJECTION, SUSPENSION INTRA-ARTICULAR; INTRAMUSCULAR at 20:07

## 2024-10-18 ENCOUNTER — APPOINTMENT (OUTPATIENT)
Dept: VASCULAR SURGERY | Facility: HOSPITAL | Age: 79
End: 2024-10-18

## 2024-10-25 ENCOUNTER — OFFICE VISIT (OUTPATIENT)
Dept: VASCULAR SURGERY | Facility: HOSPITAL | Age: 79
End: 2024-10-25
Payer: COMMERCIAL

## 2024-10-25 VITALS
DIASTOLIC BLOOD PRESSURE: 81 MMHG | HEIGHT: 66 IN | WEIGHT: 124.2 LBS | BODY MASS INDEX: 19.96 KG/M2 | HEART RATE: 91 BPM | SYSTOLIC BLOOD PRESSURE: 119 MMHG | OXYGEN SATURATION: 90 %

## 2024-10-25 DIAGNOSIS — I71.40 ABDOMINAL AORTIC ANEURYSM (AAA), UNSPECIFIED PART, UNSPECIFIED WHETHER RUPTURED (CMS-HCC): Primary | ICD-10-CM

## 2024-10-25 PROCEDURE — 99205 OFFICE O/P NEW HI 60 MIN: CPT | Performed by: SURGERY

## 2024-10-25 PROCEDURE — 99215 OFFICE O/P EST HI 40 MIN: CPT | Performed by: SURGERY

## 2024-10-25 PROCEDURE — 1159F MED LIST DOCD IN RCRD: CPT | Performed by: SURGERY

## 2024-10-25 RX ORDER — OLMESARTAN MEDOXOMIL 20 MG/1
1 TABLET ORAL
COMMUNITY
Start: 2024-10-16

## 2024-10-25 RX ORDER — EZETIMIBE AND SIMVASTATIN 10; 20 MG/1; MG/1
1 TABLET ORAL
COMMUNITY
Start: 2024-09-10

## 2024-10-25 RX ORDER — CLOPIDOGREL BISULFATE 75 MG/1
1 TABLET ORAL
COMMUNITY
Start: 2024-10-08

## 2024-10-25 RX ORDER — TRIAMCINOLONE ACETONIDE 40 MG/ML
40 INJECTION, SUSPENSION INTRA-ARTICULAR; INTRAMUSCULAR
COMMUNITY
Start: 2022-11-16

## 2024-10-25 RX ORDER — FELODIPINE 10 MG/1
1 TABLET, EXTENDED RELEASE ORAL
COMMUNITY
Start: 2024-08-27

## 2024-10-25 ASSESSMENT — ENCOUNTER SYMPTOMS
LOSS OF SENSATION IN FEET: 0
OCCASIONAL FEELINGS OF UNSTEADINESS: 0
DEPRESSION: 0

## 2024-10-25 NOTE — PROGRESS NOTES
Vascular Surgery Consult/Clinic Note    CC: AAA    HPI:  Mickey Dakin is 79 y.o. male with remote CVA and known infrarenal AAA who presents as a referral from Dr. Valera.     Reports knowing about AAA for over 20 years. Believes it measures somewhere in the mid 4cm range but is not sure. Also believes there is another aneurysm going into the right leg but he is not sure. No claudication, rest pain, or wounds. Reports exertional SOB but denies previously reporting this to any physician. Denies chest pain at rest or with exertion. No prior surgeries. No family history of aneurysms or sudden death. He is a smoker for many years, currently 1/2ppd, has not tried or thought about quitting smoking.     Reported 5.3cm infrarenal AAA. However, images are not available in PACS.      Meds:   Current Outpatient Medications on File Prior to Visit   Medication Sig Dispense Refill    clopidogrel (Plavix) 75 mg tablet Take 1 tablet (75 mg) by mouth early in the morning..      ezetimibe-simvastatin (Vytorin) 10-20 mg tablet Take 1 tablet by mouth early in the morning..      felodipine ER (Plendil) 10 mg 24 hr tablet Take 1 tablet (10 mg) by mouth early in the morning..      olmesartan (BENIcar) 20 mg tablet Take 1 tablet (20 mg) by mouth early in the morning..      triamcinolone acetonide (Kenalog-40) 40 mg/mL injection Inject 1 mL (40 mg) into the joint.       No current facility-administered medications on file prior to visit.        Allergies:   No Known Allergies    SH:    Social Drivers of Health     Tobacco Use: High Risk (10/25/2024)    Patient History     Smoking Tobacco Use: Every Day     Smokeless Tobacco Use: Never     Passive Exposure: Not on file   Alcohol Use: Not on file   Financial Resource Strain: Not on file   Food Insecurity: Not on file   Transportation Needs: Not on file   Physical Activity: Not on file   Stress: Not on file   Social Connections: Not on file   Intimate Partner Violence: Not on file    Depression: Not on file   Housing Stability: Not on file   Utilities: Not on file   Digital Equity: Not on file   Health Literacy: Not on file        FH:  No family history on file.     ROS:  Review of Systems   A complete, 10-point review of systems was completed and negative except as noted above.       Objective:  Vitals:  Vitals:    10/25/24 1220   BP: 119/81   Pulse:    SpO2:         Exam:  Constitutional: no acute distress  Neuro: awake, alert, oriented; no gross deficits noted   HEENT: No deformities, no scleral icterus   Cardiac: regular rate  Pulmonary: unlabored respirations on room air  Abdomen: soft, non-tender, non-distended  Skin: warm and dry; wounds: none  Extremities: no peripheral edema noted  MSK: motor and sensory intact in all extremities  Vascular:   Radial: R: palpable. L: palpable.   DP: R: palpable. L: palpable.      Imaging:  Unable to view, requested through radiology site    Assessment & Plan:  Mickey Dakin is 79 y.o. male with history of remote CVA, exertional SOB not otherwise worked up, and known infrarenal AAA who presents as a referral from Dr. Valera. Per report AAA is 5.3cm. However, we are unable to view these images. The request form has been submitted.     - will await CT A/P images to view in PACS for further plans  - takes Plavix and statin, continue   - if repair indicated will need cardiac and carotid evaluation     Malcolm Orellana MD, PhD  Vascular Surgery, PGY3     I saw and evaluated the patient. I personally obtained the key and critical portions of the history and physical exam or was physically present for key and critical portions performed by the resident/fellow. I reviewed the resident/fellow's documentation and discussed the patient with the resident/fellow. I agree with the resident/fellow's medical decision making as documented in the note.    Uriel Arias MD  Professor & Chief, Division of Vascular Surgery & Endovascular Therapy

## 2024-11-06 ENCOUNTER — HOSPITAL ENCOUNTER (OUTPATIENT)
Dept: RADIOLOGY | Facility: EXTERNAL LOCATION | Age: 79
Discharge: HOME | End: 2024-11-06

## 2024-11-06 DIAGNOSIS — I71.40 ABDOMINAL AORTIC ANEURYSM (AAA), UNSPECIFIED PART, UNSPECIFIED WHETHER RUPTURED (CMS-HCC): Primary | ICD-10-CM

## 2024-11-11 DIAGNOSIS — I71.40 ABDOMINAL AORTIC ANEURYSM (AAA), UNSPECIFIED PART, UNSPECIFIED WHETHER RUPTURED (CMS-HCC): Primary | ICD-10-CM

## 2024-11-29 ENCOUNTER — HOSPITAL ENCOUNTER (OUTPATIENT)
Dept: RADIOLOGY | Facility: HOSPITAL | Age: 79
Discharge: HOME | End: 2024-11-29
Payer: COMMERCIAL

## 2024-11-29 ENCOUNTER — APPOINTMENT (OUTPATIENT)
Dept: RADIOLOGY | Facility: HOSPITAL | Age: 79
End: 2024-11-29
Payer: COMMERCIAL

## 2024-11-29 ENCOUNTER — HOSPITAL ENCOUNTER (OUTPATIENT)
Dept: CARDIOLOGY | Facility: HOSPITAL | Age: 79
Discharge: HOME | End: 2024-11-29
Payer: COMMERCIAL

## 2024-11-29 DIAGNOSIS — I71.40 ABDOMINAL AORTIC ANEURYSM (AAA), UNSPECIFIED PART, UNSPECIFIED WHETHER RUPTURED (CMS-HCC): ICD-10-CM

## 2024-11-29 PROCEDURE — 93017 CV STRESS TEST TRACING ONLY: CPT

## 2024-11-29 PROCEDURE — 93018 CV STRESS TEST I&R ONLY: CPT | Performed by: INTERNAL MEDICINE

## 2024-11-29 PROCEDURE — 93016 CV STRESS TEST SUPVJ ONLY: CPT | Performed by: INTERNAL MEDICINE

## 2024-11-29 PROCEDURE — 3430000001 HC RX 343 DIAGNOSTIC RADIOPHARMACEUTICALS: Performed by: INTERNAL MEDICINE

## 2024-11-29 PROCEDURE — 78452 HT MUSCLE IMAGE SPECT MULT: CPT

## 2024-11-29 PROCEDURE — A9502 TC99M TETROFOSMIN: HCPCS | Performed by: INTERNAL MEDICINE

## 2024-11-29 PROCEDURE — 2500000004 HC RX 250 GENERAL PHARMACY W/ HCPCS (ALT 636 FOR OP/ED): Performed by: NURSE PRACTITIONER

## 2024-11-29 RX ORDER — REGADENOSON 0.08 MG/ML
0.4 INJECTION, SOLUTION INTRAVENOUS ONCE
Status: COMPLETED | OUTPATIENT
Start: 2024-11-29 | End: 2024-11-29

## 2024-12-02 DIAGNOSIS — I71.40 ABDOMINAL AORTIC ANEURYSM (AAA) WITHOUT RUPTURE, UNSPECIFIED PART (CMS-HCC): ICD-10-CM

## 2024-12-02 DIAGNOSIS — I25.10 CORONARY ARTERY DISEASE INVOLVING NATIVE HEART, UNSPECIFIED VESSEL OR LESION TYPE, UNSPECIFIED WHETHER ANGINA PRESENT: Primary | ICD-10-CM

## 2024-12-02 DIAGNOSIS — Z01.810 PREOPERATIVE CARDIOVASCULAR EXAMINATION: ICD-10-CM

## 2024-12-03 ENCOUNTER — TELEPHONE (OUTPATIENT)
Dept: CARDIOLOGY | Facility: CLINIC | Age: 79
End: 2024-12-03
Payer: COMMERCIAL

## 2024-12-03 DIAGNOSIS — I71.40 ABDOMINAL AORTIC ANEURYSM (AAA) WITHOUT RUPTURE (CMS-HCC): ICD-10-CM

## 2024-12-03 DIAGNOSIS — Z01.810 PREOPERATIVE CARDIOVASCULAR EXAMINATION: ICD-10-CM

## 2024-12-03 DIAGNOSIS — I25.10 CORONARY ARTERY DISEASE INVOLVING NATIVE HEART: Primary | ICD-10-CM

## 2024-12-03 LAB
ANION GAP SERPL CALCULATED.3IONS-SCNC: 6 MMOL/L (ref 7–16)
BUN SERPL-MCNC: 10 MG/DL (ref 6–23)
CALCIUM SERPL-MCNC: 9.7 MG/DL (ref 8.6–10.2)
CHLORIDE SERPL-SCNC: 95 MMOL/L (ref 98–107)
CO2 SERPL-SCNC: 31 MMOL/L (ref 22–29)
CREAT SERPL-MCNC: 0.7 MG/DL (ref 0.7–1.2)
ERYTHROCYTE [DISTWIDTH] IN BLOOD BY AUTOMATED COUNT: 12.6 % (ref 11.5–15)
GFR, ESTIMATED: >90 ML/MIN/1.73M2
GLUCOSE SERPL-MCNC: 95 MG/DL (ref 74–99)
HCT VFR BLD AUTO: 45.4 % (ref 37–54)
HGB BLD-MCNC: 15.2 G/DL (ref 12.5–16.5)
INR PPP: 1
MCH RBC QN AUTO: 33 PG (ref 26–35)
MCHC RBC AUTO-ENTMCNC: 33.5 G/DL (ref 32–34.5)
MCV RBC AUTO: 98.5 FL (ref 80–99.9)
PLATELET # BLD AUTO: 394 K/UL (ref 130–450)
PMV BLD AUTO: 9 FL (ref 7–12)
POTASSIUM SERPL-SCNC: 5.8 MMOL/L (ref 3.5–5)
PROTHROMBIN TIME: 10.6 SEC (ref 9.3–12.4)
RBC # BLD AUTO: 4.61 M/UL (ref 3.8–5.8)
SODIUM SERPL-SCNC: 132 MMOL/L (ref 132–146)
WBC OTHER # BLD: 9.6 K/UL (ref 4.5–11.5)

## 2024-12-05 ENCOUNTER — PHARMACY VISIT (OUTPATIENT)
Dept: PHARMACY | Facility: CLINIC | Age: 79
End: 2024-12-05
Payer: COMMERCIAL

## 2024-12-05 ENCOUNTER — HOSPITAL ENCOUNTER (OUTPATIENT)
Facility: HOSPITAL | Age: 79
Setting detail: OUTPATIENT SURGERY
Discharge: HOME | End: 2024-12-05
Attending: INTERNAL MEDICINE | Admitting: INTERNAL MEDICINE
Payer: COMMERCIAL

## 2024-12-05 VITALS
HEART RATE: 78 BPM | WEIGHT: 125 LBS | DIASTOLIC BLOOD PRESSURE: 55 MMHG | OXYGEN SATURATION: 94 % | TEMPERATURE: 97 F | HEIGHT: 66 IN | SYSTOLIC BLOOD PRESSURE: 121 MMHG | RESPIRATION RATE: 14 BRPM | BODY MASS INDEX: 20.09 KG/M2

## 2024-12-05 DIAGNOSIS — I25.84 CORONARY ATHEROSCLEROSIS DUE TO CALCIFIED CORONARY LESION (CODE): ICD-10-CM

## 2024-12-05 DIAGNOSIS — I20.0 UNSTABLE ANGINA (MULTI): ICD-10-CM

## 2024-12-05 DIAGNOSIS — R93.1 ABNORMAL FINDINGS ON DIAGNOSTIC IMAGING OF HEART AND CORONARY CIRCULATION: ICD-10-CM

## 2024-12-05 DIAGNOSIS — I25.10 CORONARY ARTERY DISEASE INVOLVING NATIVE HEART, UNSPECIFIED VESSEL OR LESION TYPE, UNSPECIFIED WHETHER ANGINA PRESENT: Primary | ICD-10-CM

## 2024-12-05 DIAGNOSIS — I71.40 ABDOMINAL AORTIC ANEURYSM (AAA) WITHOUT RUPTURE, UNSPECIFIED PART (CMS-HCC): ICD-10-CM

## 2024-12-05 DIAGNOSIS — Z01.810 PREOPERATIVE CARDIOVASCULAR EXAMINATION: ICD-10-CM

## 2024-12-05 DIAGNOSIS — I71.43 INFRARENAL ABDOMINAL AORTIC ANEURYSM, WITHOUT RUPTURE (CMS-HCC): ICD-10-CM

## 2024-12-05 LAB
ANION GAP SERPL CALC-SCNC: 10 MMOL/L (ref 10–20)
BUN SERPL-MCNC: 9 MG/DL (ref 6–23)
CALCIUM SERPL-MCNC: 9.6 MG/DL (ref 8.6–10.3)
CHLORIDE SERPL-SCNC: 99 MMOL/L (ref 98–107)
CO2 SERPL-SCNC: 31 MMOL/L (ref 21–32)
CREAT SERPL-MCNC: 0.55 MG/DL (ref 0.5–1.3)
EGFRCR SERPLBLD CKD-EPI 2021: >90 ML/MIN/1.73M*2
GLUCOSE SERPL-MCNC: 98 MG/DL (ref 74–99)
POTASSIUM SERPL-SCNC: 4.8 MMOL/L (ref 3.5–5.3)
SODIUM SERPL-SCNC: 135 MMOL/L (ref 136–145)

## 2024-12-05 PROCEDURE — 7100000010 HC PHASE TWO TIME - EACH INCREMENTAL 1 MINUTE: Performed by: INTERNAL MEDICINE

## 2024-12-05 PROCEDURE — C1894 INTRO/SHEATH, NON-LASER: HCPCS | Performed by: INTERNAL MEDICINE

## 2024-12-05 PROCEDURE — 99153 MOD SED SAME PHYS/QHP EA: CPT | Performed by: INTERNAL MEDICINE

## 2024-12-05 PROCEDURE — 99152 MOD SED SAME PHYS/QHP 5/>YRS: CPT | Performed by: INTERNAL MEDICINE

## 2024-12-05 PROCEDURE — 93458 L HRT ARTERY/VENTRICLE ANGIO: CPT | Performed by: INTERNAL MEDICINE

## 2024-12-05 PROCEDURE — RXMED WILLOW AMBULATORY MEDICATION CHARGE

## 2024-12-05 PROCEDURE — 2500000005 HC RX 250 GENERAL PHARMACY W/O HCPCS: Performed by: INTERNAL MEDICINE

## 2024-12-05 PROCEDURE — 36415 COLL VENOUS BLD VENIPUNCTURE: CPT | Performed by: NURSE PRACTITIONER

## 2024-12-05 PROCEDURE — 2720000007 HC OR 272 NO HCPCS: Performed by: INTERNAL MEDICINE

## 2024-12-05 PROCEDURE — C1760 CLOSURE DEV, VASC: HCPCS | Performed by: INTERNAL MEDICINE

## 2024-12-05 PROCEDURE — C1769 GUIDE WIRE: HCPCS | Performed by: INTERNAL MEDICINE

## 2024-12-05 PROCEDURE — 93454 CORONARY ARTERY ANGIO S&I: CPT | Performed by: INTERNAL MEDICINE

## 2024-12-05 PROCEDURE — 99223 1ST HOSP IP/OBS HIGH 75: CPT | Performed by: NURSE PRACTITIONER

## 2024-12-05 PROCEDURE — 2500000004 HC RX 250 GENERAL PHARMACY W/ HCPCS (ALT 636 FOR OP/ED): Performed by: INTERNAL MEDICINE

## 2024-12-05 PROCEDURE — 96373 THER/PROPH/DIAG INJ IA: CPT | Performed by: INTERNAL MEDICINE

## 2024-12-05 PROCEDURE — 80048 BASIC METABOLIC PNL TOTAL CA: CPT | Performed by: NURSE PRACTITIONER

## 2024-12-05 PROCEDURE — 7100000009 HC PHASE TWO TIME - INITIAL BASE CHARGE: Performed by: INTERNAL MEDICINE

## 2024-12-05 PROCEDURE — 2550000001 HC RX 255 CONTRASTS: Performed by: INTERNAL MEDICINE

## 2024-12-05 RX ORDER — MIDAZOLAM HYDROCHLORIDE 1 MG/ML
INJECTION, SOLUTION INTRAMUSCULAR; INTRAVENOUS AS NEEDED
Status: DISCONTINUED | OUTPATIENT
Start: 2024-12-05 | End: 2024-12-05 | Stop reason: HOSPADM

## 2024-12-05 RX ORDER — FENTANYL CITRATE 50 UG/ML
INJECTION, SOLUTION INTRAMUSCULAR; INTRAVENOUS AS NEEDED
Status: DISCONTINUED | OUTPATIENT
Start: 2024-12-05 | End: 2024-12-05 | Stop reason: HOSPADM

## 2024-12-05 RX ORDER — CLOPIDOGREL BISULFATE 75 MG/1
75 TABLET ORAL ONCE
Status: DISCONTINUED | OUTPATIENT
Start: 2024-12-05 | End: 2024-12-09 | Stop reason: HOSPADM

## 2024-12-05 RX ORDER — NAPROXEN SODIUM 220 MG/1
324 TABLET, FILM COATED ORAL ONCE
Status: DISCONTINUED | OUTPATIENT
Start: 2024-12-05 | End: 2024-12-09 | Stop reason: HOSPADM

## 2024-12-05 RX ORDER — HEPARIN SODIUM 1000 [USP'U]/ML
INJECTION, SOLUTION INTRAVENOUS; SUBCUTANEOUS AS NEEDED
Status: DISCONTINUED | OUTPATIENT
Start: 2024-12-05 | End: 2024-12-05 | Stop reason: HOSPADM

## 2024-12-05 RX ORDER — ACETAMINOPHEN 650 MG/1
650 SUPPOSITORY RECTAL EVERY 6 HOURS PRN
Status: DISCONTINUED | OUTPATIENT
Start: 2024-12-05 | End: 2024-12-09 | Stop reason: HOSPADM

## 2024-12-05 RX ORDER — NITROGLYCERIN 40 MG/100ML
INJECTION INTRAVENOUS AS NEEDED
Status: DISCONTINUED | OUTPATIENT
Start: 2024-12-05 | End: 2024-12-05 | Stop reason: HOSPADM

## 2024-12-05 RX ORDER — EZETIMIBE 10 MG/1
10 TABLET ORAL DAILY
Qty: 90 TABLET | Refills: 0 | Status: SHIPPED | OUTPATIENT
Start: 2024-12-05

## 2024-12-05 RX ORDER — ACETAMINOPHEN 325 MG/1
650 TABLET ORAL EVERY 6 HOURS PRN
Status: DISCONTINUED | OUTPATIENT
Start: 2024-12-05 | End: 2024-12-09 | Stop reason: HOSPADM

## 2024-12-05 RX ORDER — ACETAMINOPHEN 160 MG/5ML
650 SOLUTION ORAL EVERY 6 HOURS PRN
Status: DISCONTINUED | OUTPATIENT
Start: 2024-12-05 | End: 2024-12-09 | Stop reason: HOSPADM

## 2024-12-05 RX ORDER — ROSUVASTATIN CALCIUM 40 MG/1
40 TABLET, COATED ORAL DAILY
Qty: 90 TABLET | Refills: 0 | Status: SHIPPED | OUTPATIENT
Start: 2024-12-05

## 2024-12-05 RX ORDER — ASPIRIN 81 MG/1
81 TABLET ORAL DAILY
COMMUNITY

## 2024-12-05 RX ORDER — LIDOCAINE HYDROCHLORIDE 10 MG/ML
INJECTION, SOLUTION EPIDURAL; INFILTRATION; INTRACAUDAL; PERINEURAL AS NEEDED
Status: DISCONTINUED | OUTPATIENT
Start: 2024-12-05 | End: 2024-12-05 | Stop reason: HOSPADM

## 2024-12-05 ASSESSMENT — PAIN - FUNCTIONAL ASSESSMENT
PAIN_FUNCTIONAL_ASSESSMENT: 0-10

## 2024-12-05 ASSESSMENT — ENCOUNTER SYMPTOMS
NEUROLOGICAL NEGATIVE: 1
CARDIOVASCULAR NEGATIVE: 1
RESPIRATORY NEGATIVE: 1
EYES NEGATIVE: 1
GASTROINTESTINAL NEGATIVE: 1
HEMATOLOGIC/LYMPHATIC NEGATIVE: 1
FATIGUE: 1
ALLERGIC/IMMUNOLOGIC NEGATIVE: 1
MUSCULOSKELETAL NEGATIVE: 1
ENDOCRINE NEGATIVE: 1
PSYCHIATRIC NEGATIVE: 1

## 2024-12-05 ASSESSMENT — COLUMBIA-SUICIDE SEVERITY RATING SCALE - C-SSRS
1. IN THE PAST MONTH, HAVE YOU WISHED YOU WERE DEAD OR WISHED YOU COULD GO TO SLEEP AND NOT WAKE UP?: NO
2. HAVE YOU ACTUALLY HAD ANY THOUGHTS OF KILLING YOURSELF?: NO
6. HAVE YOU EVER DONE ANYTHING, STARTED TO DO ANYTHING, OR PREPARED TO DO ANYTHING TO END YOUR LIFE?: NO

## 2024-12-05 ASSESSMENT — PAIN SCALES - GENERAL: PAINLEVEL_OUTOF10: 0 - NO PAIN

## 2024-12-05 NOTE — H&P
History Of Present Illness  Mickey Dakin is a 79 y.o. male presenting with infrarenal AAA, here for [facility] as presurgical evaluation. PMH includes AAA for over 20 years and tobacco user.    Past Medical History:  History reviewed. No pertinent past medical history.     Past Surgical History:  History reviewed. No pertinent surgical history.       Social History:   reports that he has quit smoking. His smoking use included cigarettes. He started smoking about 59 years ago. He has a 30 pack-year smoking history. He has never used smokeless tobacco. He reports that he does not drink alcohol and does not use drugs.     Family History:  No family history on file.     Allergies:  No Known Allergies     Home Medications:  Current Outpatient Medications   Medication Instructions    aspirin 81 mg, Daily    clopidogrel (Plavix) 75 mg tablet 1 tablet, Daily (0630)    ezetimibe-simvastatin (Vytorin) 10-20 mg tablet 1 tablet, Daily (0630)    felodipine ER (Plendil) 10 mg 24 hr tablet 1 tablet, Daily (0630)    olmesartan (BENIcar) 20 mg tablet 1 tablet, Daily (0630)    triamcinolone acetonide (KENALOG-40) 40 mg       Inpatient Medications:            Review of Systems   Constitutional:  Positive for fatigue.   HENT: Negative.     Eyes: Negative.    Respiratory: Negative.     Cardiovascular: Negative.    Gastrointestinal: Negative.    Endocrine: Negative.    Genitourinary: Negative.    Musculoskeletal: Negative.    Skin: Negative.    Allergic/Immunologic: Negative.    Neurological: Negative.    Hematological: Negative.    Psychiatric/Behavioral: Negative.            Physical Exam  Constitutional:       General: He is awake. He is not in acute distress.     Appearance: He is not ill-appearing.   Cardiovascular:      Rate and Rhythm: Normal rate and regular rhythm.      Pulses:           Radial pulses are 2+ on the right side and 2+ on the left side.        Dorsalis pedis pulses are 1+ on the right side and 1+ on the left side.       "Heart sounds: Normal heart sounds. No murmur heard.  Pulmonary:      Effort: Pulmonary effort is normal.      Breath sounds: Normal breath sounds and air entry.   Abdominal:      General: Bowel sounds are normal.      Palpations: Abdomen is soft.      Tenderness: There is no abdominal tenderness.   Musculoskeletal:      Right lower le+ Edema present.      Left lower le+ Edema present.   Skin:     General: Skin is warm and dry.   Neurological:      General: No focal deficit present.      Mental Status: He is alert and oriented to person, place, and time.      GCS: GCS eye subscore is 4. GCS verbal subscore is 5. GCS motor subscore is 6.   Psychiatric:         Mood and Affect: Mood normal.         Behavior: Behavior is cooperative.        Sedation Plan    ASA 3     Mallampati class: II.           NPO since 0430 this morning    Last Recorded Vitals  Blood pressure 119/61, pulse 90, temperature 37 °C (98.6 °F), temperature source Temporal, resp. rate 16, height 1.676 m (5' 6\"), weight 56.7 kg (125 lb), SpO2 95%.         Vitals from the Past 24 Hours  Heart Rate:  [90]   Temp:  [37 °C (98.6 °F)]   Resp:  [16]   BP: (119)/(61)   Height:  [167.6 cm (5' 6\")]   Weight:  [56.7 kg (125 lb)]   SpO2:  [95 %]          Relevant Results    Labs        CBC: No results for input(s): \"WBC\", \"HGB\", \"HCT\", \"PLT\", \"MCV\" in the last 33545 hours.  BMP/CMP: No results for input(s): \"NA\", \"K\", \"CL\", \"BUN\", \"CREATININE\", \"CO2\", \"CALCIUM\", \"PROT\", \"BILITOT\", \"ALKPHOS\", \"ALT\", \"AST\", \"GLUCOSE\" in the last 63901 hours.    No lab exists for component: \"LABALBU\"   Magnesium: No results for input(s): \"MG\" in the last 09894 hours.  Lipid Panel: No results for input(s): \"CHOL\", \"HDL\", \"CHHDL\", \"LDL\", \"VLDL\", \"TRIG\", \"NHDL\" in the last 28590 hours.  Cardiac       No lab exists for component: \"CK\", \"CKMBP\"   Hemoglobin A1C: No results for input(s): \"HGBA1C\" in the last 12925 hours.  TSH/ Free T4: No results for input(s): \"TSH\", \"FREET4\" in the " "last 26151 hours.  Iron: No results for input(s): \"FERRITIN\", \"TIBC\", \"IRONSAT\", \"BNP\" in the last 79746 hours.  Coag:     ABO: No results found for: \"ABO\"    Past Cardiology Tests (Last 3 Years):    EKG:  No results for input(s): \"ATRRATE\", \"VENTRATE\", \"PRINT\", \"QRSDUR\", \"QTCFRED\", \"QTCCALCB\" in the last 79529 hours.No results found for this or any previous visit (from the past 4464 hours).  Echo:  Echocardiogram: No results found for this or any previous visit from the past 1800 days.    Ejection Fractions:  No results found for: \"EF\"  Cath:  Coronary Angiography: No results found for this or any previous visit from the past 1800 days.    Right Heart Cath: No results found for this or any previous visit from the past 1800 days.    Stress Test:  Nuclear:No results found for this or any previous visit from the past 1800 days.    Metabolic Stress: No results found for this or any previous visit from the past 1800 days.    Cardiac Imaging:  Cardiac Scoring: No results found for this or any previous visit from the past 1800 days.    Cardiac MRI: No results found for this or any previous visit from the past 1800 days.         Assessment/Plan  Assessment/Plan   Active Problems:    Coronary artery disease involving native heart    Abdominal aortic aneurysm (AAA) without rupture (CMS-[facility])    Preoperative cardiovascular examination    Infrarenal AAA/ pre-surgical evaluation  -[facility] with Dr. Gillombardo on 12/5/24  -asa prior to procedure    Hyperkalemia on labs 12/3/24  -recheck BMP       NP discussed with Dr. Gillombardo regarding plan of care/ discharge plan      I spent 30 minutes in the professional and overall care of this patient.      Emmanuel Kumar, APRN-CNP, DNP    "

## 2024-12-05 NOTE — POST-PROCEDURE NOTE
Physician Transition of Care Summary  Invasive Cardiovascular Lab    Procedure Date: 12/5/2024  Attending:    * Carl B Gillombardo - Primary      Indications:   Pre-op Diagnosis      * Coronary artery disease involving native heart, unspecified vessel or lesion type, unspecified whether angina present [I25.10]     * Abdominal aortic aneurysm (AAA) without rupture, unspecified part (CMS-HCC) [I71.40]     * Preoperative cardiovascular examination [Z01.810]    Post-procedure diagnosis:   Post-op Diagnosis     * Coronary artery disease involving native heart, unspecified vessel or lesion type, unspecified whether angina present [I25.10]     * Abdominal aortic aneurysm (AAA) without rupture, unspecified part (CMS-HCC) [I71.40]     * Preoperative cardiovascular examination [Z01.810]    Procedure(s):   Left Heart Cath with Coronary Angiography and LV  47219 - HI CATH PLMT L HRT & ARTS W/NJX & ANGIO IMG S&I        Procedure Findings:   Right dominant coronary circulation with severe multivessel CAD  LAD: 70% mid segment, involving the bifurcation of a non-trivial diagonal branch  LCX: 50% ostial + 90% prox segment  RCA: 90% mid segment, moderately calcific + tortuous  rPLV: 80% prox segment    Description of the Procedure:   6F right radial artery access, hemostasis achieved using a single TR band  4 + 5 [place] diagnostic catheters    Complications:   None    Stents/Implants:   Implants       No implant documentation for this case.            Anticoagulation/Antiplatelet Plan:   Periprocedural heparin   Aspirin 81 mg    Estimated Blood Loss:   5 mL    Anesthesia: Moderate Sedation Anesthesia Staff: No anesthesia staff entered.    Any Specimen(s) Removed:   Order Name Source Comment Collection Info Order Time   BASIC METABOLIC PANEL Blood, Venous  Collected By: Radha Caldwell RN 12/5/2024  9:20 AM     Release result to [employer]   Immediate            Disposition:   Team post-cath care  Recommend CT surgery consultation for  possible multivessel CABG      Electronically signed by: Carl B Gillombardo, MD, 12/5/2024 4:03 PM

## 2024-12-11 ENCOUNTER — OFFICE VISIT (OUTPATIENT)
Dept: CARDIAC SURGERY | Facility: CLINIC | Age: 79
End: 2024-12-11
Payer: COMMERCIAL

## 2024-12-11 VITALS
DIASTOLIC BLOOD PRESSURE: 73 MMHG | BODY MASS INDEX: 19.77 KG/M2 | WEIGHT: 123 LBS | OXYGEN SATURATION: 93 % | SYSTOLIC BLOOD PRESSURE: 106 MMHG | HEIGHT: 66 IN | HEART RATE: 86 BPM

## 2024-12-11 DIAGNOSIS — I25.10 CORONARY ARTERY DISEASE INVOLVING NATIVE HEART, UNSPECIFIED VESSEL OR LESION TYPE, UNSPECIFIED WHETHER ANGINA PRESENT: ICD-10-CM

## 2024-12-11 DIAGNOSIS — F17.210 CIGARETTE SMOKER: ICD-10-CM

## 2024-12-11 DIAGNOSIS — Z86.73 HISTORY OF STROKE: ICD-10-CM

## 2024-12-11 PROCEDURE — 99212 OFFICE O/P EST SF 10 MIN: CPT | Performed by: STUDENT IN AN ORGANIZED HEALTH CARE EDUCATION/TRAINING PROGRAM

## 2024-12-11 PROCEDURE — 99202 OFFICE O/P NEW SF 15 MIN: CPT | Performed by: STUDENT IN AN ORGANIZED HEALTH CARE EDUCATION/TRAINING PROGRAM

## 2024-12-11 PROCEDURE — 1126F AMNT PAIN NOTED NONE PRSNT: CPT | Performed by: STUDENT IN AN ORGANIZED HEALTH CARE EDUCATION/TRAINING PROGRAM

## 2024-12-11 PROCEDURE — 4004F PT TOBACCO SCREEN RCVD TLK: CPT | Performed by: STUDENT IN AN ORGANIZED HEALTH CARE EDUCATION/TRAINING PROGRAM

## 2024-12-11 PROCEDURE — 1159F MED LIST DOCD IN RCRD: CPT | Performed by: STUDENT IN AN ORGANIZED HEALTH CARE EDUCATION/TRAINING PROGRAM

## 2024-12-11 RX ORDER — ALBUTEROL SULFATE 90 UG/1
1 INHALANT RESPIRATORY (INHALATION) ONCE
OUTPATIENT
Start: 2024-12-11

## 2024-12-11 RX ORDER — ALBUTEROL SULFATE 0.83 MG/ML
3 SOLUTION RESPIRATORY (INHALATION) ONCE
OUTPATIENT
Start: 2024-12-11

## 2024-12-11 ASSESSMENT — PAIN SCALES - GENERAL: PAINLEVEL_OUTOF10: 0-NO PAIN

## 2024-12-11 ASSESSMENT — ENCOUNTER SYMPTOMS
OCCASIONAL FEELINGS OF UNSTEADINESS: 0
LOSS OF SENSATION IN FEET: 0
DEPRESSION: 0

## 2024-12-11 NOTE — PROGRESS NOTES
Chief Complaint  Multivessel coronary artery disease    HPI:  Mickey Dakin is a 79-year-old male who was initially evaluated by Dr. Arias for a 5.3cm abdominal aortic aneurysm. He underwent cardiac catheterization by Dr. Gillombardo for preoperative evaluation, which demonstrated multivessel coronary artery disease. He has been referred for CABG evaluation. He denies chest pain, but complains of exertional dyspnea. He is an active smoker, having smoked 1/2 PPD x 30 years. He had a CVA in 1994, which was attributed to hypertension. He has had uncles die of heart attacks, but denies other family history of premature CAD, aortic aneurysms, or connective tissue disease.       Past Medical History:   Diagnosis Date    Abdominal aortic aneurysm (AAA) (CMS-[facility])     CVA (cerebral vascular accident) (Multi) 1994    Hypertension        Past Surgical History:   Procedure Laterality Date    CARDIAC CATHETERIZATION N/A 12/5/2024    Procedure: Left Heart Cath with Coronary Angiography and LV;  Surgeon: Carl B Gillombardo, MD;  Location: [facility] Cardiac Cath Lab;  Service: Cardiovascular;  Laterality: N/A;  [facility] THURS DEC 5TH, 2024 AT 9:00 AM PT WILL ARRIVE AT 7:30 AM AT [facility] - DR. GILLOMBARDO       Family History   Problem Relation Name Age of Onset    Aortic aneurysm Neg Hx         Social History     Socioeconomic History    Marital status:      Spouse name: Not on file    Number of children: Not on file    Years of education: Not on file    Highest education level: Not on file   Occupational History    Not on file   Tobacco Use    Smoking status: Every Day     Current packs/day: 0.50     Average packs/day: 0.5 packs/day for 59.9 years (30.0 ttl pk-yrs)     Types: Cigarettes     Start date: 01/1965    Smokeless tobacco: Never   Substance and Sexual Activity    Alcohol use: Never    Drug use: Never    Sexual activity: Not on file   Other Topics Concern    Not on file   Social History Narrative    Not on file     Social Drivers  [facility]     Financial Resource Strain: Not on file   Food Insecurity: Not on file   Transportation Needs: Not on file   Physical Activity: Not on file   Stress: Not on file   Social Connections: Not on file   Intimate Partner Violence: Not on file   Housing Stability: Not on file       No Known Allergies    Outpatient Encounter Medications as of 12/11/2024   Medication Sig Dispense Refill    aspirin 81 mg EC tablet Take 1 tablet (81 mg) by mouth once daily.      clopidogrel (Plavix) 75 mg tablet Take 1 tablet (75 mg) by mouth early in the morning..      ezetimibe (Zetia) 10 mg tablet Take 1 tablet (10 mg) by mouth once daily. 90 tablet 0    felodipine ER (Plendil) 10 mg 24 hr tablet Take 1 tablet (10 mg) by mouth early in the morning..      olmesartan (BENIcar) 20 mg tablet Take 1 tablet (20 mg) by mouth early in the morning..      rosuvastatin (Crestor) 40 mg tablet Take 1 tablet (40 mg) by mouth once daily. 90 tablet 0    [DISCONTINUED] ezetimibe-simvastatin (Vytorin) 10-20 mg tablet Take 1 tablet by mouth early in the morning..      [DISCONTINUED] triamcinolone acetonide (Kenalog-40) 40 mg/mL injection Inject 1 mL (40 mg) into the joint. (Patient not taking: Reported on 12/5/2024)       No facility-administered encounter medications on file as of 12/11/2024.       Physical Exam  Constitutional:       General: He is not in acute distress.     Appearance: Normal appearance. He is not ill-appearing.   HENT:      Head: Normocephalic and atraumatic.   Cardiovascular:      Rate and Rhythm: Normal rate and regular rhythm.   Pulmonary:      Effort: Pulmonary effort is normal. No respiratory distress.   Musculoskeletal:      Right lower leg: No edema.      Left lower leg: No edema.   Skin:     General: Skin is warm and dry.   Neurological:      Mental Status: He is alert and oriented to person, place, and time. Mental status is at baseline.   Psychiatric:         Mood and Affect: Mood normal.         Behavior: Behavior  "normal.       No results found for this or any previous visit (from the past 4464 hours).    No results found for: \"WBC\", \"HGB\", \"HCT\", \"MCV\", \"PLT\"  Lab Results   Component Value Date    GLUCOSE 98 12/05/2024    CALCIUM 9.6 12/05/2024     (L) 12/05/2024    K 4.8 12/05/2024    CO2 31 12/05/2024    CL 99 12/05/2024    BUN 9 12/05/2024    CREATININE 0.55 12/05/2024     No echocardiogram results found for the past 12 months     Assessment and Plan:   Mr. Mickey Dakin is a 79-year-old male who has been referred with multivessel coronary artery disease.    I had a chance to review all available, pertinent investigations. My interpretation of these studies is as follows:  - Severe multivessel coronary artery disease including 70% LAD, 90% LCx, 90% RCA, and 80% RPLV stenosis.     Based on a review of his symptoms and investigations, I would consider him for coronary artery bypass grafting.      Further workup is necessary for risk stratification, operative planning, and to inform the patient's decision on whether to proceed with cardiac surgery.     I recommend the follow studies:   - Transthoracic echocardiogram to evaluate his biventricular function and for valvulopathy  - CT Chest to evaluate for thoracic aortic aneurysm   - Vein mapping to assess his conduit for coronary artery bypass grafting   - Carotid duplex US to evaluate for carotid artery stenosis given his history of CVA   - Pulmonary function testing given his history of active tobacco use    Following review of these studies, we will meet again to discuss next steps.     Annalee Steiner MD  Cardiac Surgeon   "

## 2025-01-01 ENCOUNTER — HOSPITAL ENCOUNTER (INPATIENT)
Age: 80
LOS: 2 days | DRG: 064 | End: 2025-03-18
Attending: EMERGENCY MEDICINE | Admitting: INTERNAL MEDICINE
Payer: MEDICARE

## 2025-01-01 ENCOUNTER — APPOINTMENT (OUTPATIENT)
Dept: GENERAL RADIOLOGY | Age: 80
DRG: 064 | End: 2025-01-01
Payer: MEDICARE

## 2025-01-01 ENCOUNTER — APPOINTMENT (OUTPATIENT)
Dept: CT IMAGING | Age: 80
DRG: 064 | End: 2025-01-01
Payer: MEDICARE

## 2025-01-01 VITALS
SYSTOLIC BLOOD PRESSURE: 128 MMHG | TEMPERATURE: 97.9 F | WEIGHT: 165.34 LBS | RESPIRATION RATE: 18 BRPM | DIASTOLIC BLOOD PRESSURE: 52 MMHG | OXYGEN SATURATION: 100 % | BODY MASS INDEX: 26.69 KG/M2 | HEART RATE: 102 BPM

## 2025-01-01 DIAGNOSIS — J96.01 ACUTE RESPIRATORY FAILURE WITH HYPOXIA AND HYPERCAPNIA: Primary | ICD-10-CM

## 2025-01-01 DIAGNOSIS — J96.02 ACUTE RESPIRATORY FAILURE WITH HYPOXIA AND HYPERCAPNIA: Primary | ICD-10-CM

## 2025-01-01 DIAGNOSIS — I31.2: ICD-10-CM

## 2025-01-01 DIAGNOSIS — I62.9 INTRACRANIAL HEMORRHAGE (HCC): ICD-10-CM

## 2025-01-01 LAB
AADO2: 51 MMHG
ALBUMIN SERPL-MCNC: 3.7 G/DL (ref 3.5–5.2)
ALP SERPL-CCNC: 98 U/L (ref 40–129)
ALT SERPL-CCNC: 32 U/L (ref 0–40)
ANION GAP SERPL CALCULATED.3IONS-SCNC: 8 MMOL/L (ref 7–16)
AST SERPL-CCNC: 54 U/L (ref 0–39)
B.E.: 5.7 MMOL/L (ref -3–3)
BASOPHILS # BLD: 0.06 K/UL (ref 0–0.2)
BASOPHILS NFR BLD: 1 % (ref 0–2)
BILIRUB SERPL-MCNC: 0.5 MG/DL (ref 0–1.2)
BNP SERPL-MCNC: 4055 PG/ML (ref 0–450)
BUN SERPL-MCNC: 11 MG/DL (ref 6–23)
CALCIUM SERPL-MCNC: 9.1 MG/DL (ref 8.6–10.2)
CHLORIDE SERPL-SCNC: 92 MMOL/L (ref 98–107)
CO2 SERPL-SCNC: 38 MMOL/L (ref 22–29)
COHB: 1 % (ref 0–1.5)
COHB: 1.5 % (ref 0–1.5)
COMMENT: ABNORMAL
CREAT SERPL-MCNC: 0.5 MG/DL (ref 0.7–1.2)
CRITICAL: ABNORMAL
CRITICAL: ABNORMAL
DATE ANALYZED: ABNORMAL
DATE ANALYZED: ABNORMAL
DATE OF COLLECTION: ABNORMAL
DATE OF COLLECTION: ABNORMAL
EKG ATRIAL RATE: 102 BPM
EKG P AXIS: 87 DEGREES
EKG P-R INTERVAL: 126 MS
EKG Q-T INTERVAL: 370 MS
EKG QRS DURATION: 72 MS
EKG QTC CALCULATION (BAZETT): 482 MS
EKG R AXIS: 83 DEGREES
EKG T AXIS: -14 DEGREES
EKG VENTRICULAR RATE: 102 BPM
EOSINOPHIL # BLD: 0.03 K/UL (ref 0.05–0.5)
EOSINOPHILS RELATIVE PERCENT: 0 % (ref 0–6)
ERYTHROCYTE [DISTWIDTH] IN BLOOD BY AUTOMATED COUNT: 13.6 % (ref 11.5–15)
FIO2: 100 %
FIO2: 50 %
FLUAV RNA RESP QL NAA+PROBE: NOT DETECTED
FLUBV RNA RESP QL NAA+PROBE: NOT DETECTED
GFR, ESTIMATED: >90 ML/MIN/1.73M2
GLUCOSE SERPL-MCNC: 120 MG/DL (ref 74–99)
HCO3: 33.5 MMOL/L (ref 22–26)
HCT VFR BLD AUTO: 46.9 % (ref 37–54)
HGB BLD-MCNC: 14.7 G/DL (ref 12.5–16.5)
HHB: 0.1 % (ref 0–5)
HHB: 0.2 % (ref 0–5)
IMM GRANULOCYTES # BLD AUTO: 0.11 K/UL (ref 0–0.58)
IMM GRANULOCYTES NFR BLD: 1 % (ref 0–5)
INR PPP: 1
LAB: ABNORMAL
LAB: ABNORMAL
LACTATE BLDV-SCNC: 2.3 MMOL/L (ref 0.5–2.2)
LYMPHOCYTES NFR BLD: 1.54 K/UL (ref 1.5–4)
LYMPHOCYTES RELATIVE PERCENT: 12 % (ref 20–42)
Lab: 1205
Lab: 1347
MCH RBC QN AUTO: 33 PG (ref 26–35)
MCHC RBC AUTO-ENTMCNC: 31.3 G/DL (ref 32–34.5)
MCV RBC AUTO: 105.4 FL (ref 80–99.9)
METHB: 0 % (ref 0–1.5)
METHB: 0.3 % (ref 0–1.5)
MODE: ABNORMAL
MODE: AC
MONOCYTES NFR BLD: 0.81 K/UL (ref 0.1–0.95)
MONOCYTES NFR BLD: 6 % (ref 2–12)
NEUTROPHILS NFR BLD: 80 % (ref 43–80)
NEUTS SEG NFR BLD: 10.43 K/UL (ref 1.8–7.3)
O2 CONTENT: 19.7 ML/DL
O2 CONTENT: 21.3 ML/DL
O2 SATURATION: 99.8 % (ref 92–98.5)
O2 SATURATION: 99.9 % (ref 92–98.5)
O2HB: 98.4 % (ref 94–97)
O2HB: 98.5 % (ref 94–97)
OPERATOR ID: ABNORMAL
OPERATOR ID: ABNORMAL
PARTIAL THROMBOPLASTIN TIME: 37.8 SEC (ref 24.5–35.1)
PATIENT TEMP: 37 C
PATIENT TEMP: 37 C
PCO2: 63.7 MMHG (ref 35–45)
PCO2: >170 MMHG (ref 35–45)
PEEP/CPAP: 5 CMH2O
PFO2: 3.52 MMHG/%
PFO2: 4.43 MMHG/%
PH BLOOD GAS: 7.01 (ref 7.35–7.45)
PH BLOOD GAS: 7.34 (ref 7.35–7.45)
PLATELET # BLD AUTO: 298 K/UL (ref 130–450)
PMV BLD AUTO: 8.9 FL (ref 7–12)
PO2: 221.3 MMHG (ref 75–100)
PO2: 352.3 MMHG (ref 75–100)
POTASSIUM SERPL-SCNC: 5.3 MMOL/L (ref 3.5–5)
PROT SERPL-MCNC: 6.3 G/DL (ref 6.4–8.3)
PROTHROMBIN TIME: 10.8 SEC (ref 9.3–12.4)
RBC # BLD AUTO: 4.45 M/UL (ref 3.8–5.8)
RI(T): 0.23
RR MECHANICAL: 20 B/MIN
SARS-COV-2 RNA RESP QL NAA+PROBE: NOT DETECTED
SODIUM SERPL-SCNC: 138 MMOL/L (ref 132–146)
SOURCE, BLOOD GAS: ABNORMAL
SOURCE, BLOOD GAS: ABNORMAL
SOURCE: NORMAL
SPECIMEN DESCRIPTION: NORMAL
THB: 13.9 G/DL (ref 11.5–16.5)
THB: 14.8 G/DL (ref 11.5–16.5)
TIME ANALYZED: 1224
TIME ANALYZED: 1400
TROPONIN I SERPL HS-MCNC: 103 NG/L (ref 0–11)
TROPONIN I SERPL HS-MCNC: 163 NG/L (ref 0–11)
TROPONIN I SERPL HS-MCNC: 193 NG/L (ref 0–11)
VT MECHANICAL: 400 ML
WBC OTHER # BLD: 13 K/UL (ref 4.5–11.5)

## 2025-01-01 PROCEDURE — 87040 BLOOD CULTURE FOR BACTERIA: CPT

## 2025-01-01 PROCEDURE — 31500 INSERT EMERGENCY AIRWAY: CPT

## 2025-01-01 PROCEDURE — 6360000004 HC RX CONTRAST MEDICATION: Performed by: RADIOLOGY

## 2025-01-01 PROCEDURE — 96365 THER/PROPH/DIAG IV INF INIT: CPT

## 2025-01-01 PROCEDURE — 6360000002 HC RX W HCPCS: Performed by: INTERNAL MEDICINE

## 2025-01-01 PROCEDURE — 94640 AIRWAY INHALATION TREATMENT: CPT

## 2025-01-01 PROCEDURE — 99291 CRITICAL CARE FIRST HOUR: CPT | Performed by: INTERNAL MEDICINE

## 2025-01-01 PROCEDURE — 2500000003 HC RX 250 WO HCPCS: Performed by: EMERGENCY MEDICINE

## 2025-01-01 PROCEDURE — 1200000000 HC SEMI PRIVATE

## 2025-01-01 PROCEDURE — 84484 ASSAY OF TROPONIN QUANT: CPT

## 2025-01-01 PROCEDURE — 96368 THER/DIAG CONCURRENT INF: CPT

## 2025-01-01 PROCEDURE — 0BH17EZ INSERTION OF ENDOTRACHEAL AIRWAY INTO TRACHEA, VIA NATURAL OR ARTIFICIAL OPENING: ICD-10-PCS | Performed by: EMERGENCY MEDICINE

## 2025-01-01 PROCEDURE — 6360000002 HC RX W HCPCS

## 2025-01-01 PROCEDURE — 6360000002 HC RX W HCPCS: Performed by: EMERGENCY MEDICINE

## 2025-01-01 PROCEDURE — 93010 ELECTROCARDIOGRAM REPORT: CPT | Performed by: INTERNAL MEDICINE

## 2025-01-01 PROCEDURE — 2500000003 HC RX 250 WO HCPCS

## 2025-01-01 PROCEDURE — 83605 ASSAY OF LACTIC ACID: CPT

## 2025-01-01 PROCEDURE — 2500000003 HC RX 250 WO HCPCS: Performed by: INTERNAL MEDICINE

## 2025-01-01 PROCEDURE — 5A1935Z RESPIRATORY VENTILATION, LESS THAN 24 CONSECUTIVE HOURS: ICD-10-PCS | Performed by: EMERGENCY MEDICINE

## 2025-01-01 PROCEDURE — 71045 X-RAY EXAM CHEST 1 VIEW: CPT

## 2025-01-01 PROCEDURE — 83880 ASSAY OF NATRIURETIC PEPTIDE: CPT

## 2025-01-01 PROCEDURE — 36600 WITHDRAWAL OF ARTERIAL BLOOD: CPT

## 2025-01-01 PROCEDURE — 99285 EMERGENCY DEPT VISIT HI MDM: CPT

## 2025-01-01 PROCEDURE — 6370000000 HC RX 637 (ALT 250 FOR IP): Performed by: EMERGENCY MEDICINE

## 2025-01-01 PROCEDURE — 96366 THER/PROPH/DIAG IV INF ADDON: CPT

## 2025-01-01 PROCEDURE — 87636 SARSCOV2 & INF A&B AMP PRB: CPT

## 2025-01-01 PROCEDURE — 93005 ELECTROCARDIOGRAM TRACING: CPT | Performed by: EMERGENCY MEDICINE

## 2025-01-01 PROCEDURE — 74174 CTA ABD&PLVS W/CONTRAST: CPT

## 2025-01-01 PROCEDURE — 82805 BLOOD GASES W/O2 SATURATION: CPT

## 2025-01-01 PROCEDURE — 96375 TX/PRO/DX INJ NEW DRUG ADDON: CPT

## 2025-01-01 PROCEDURE — 2580000003 HC RX 258

## 2025-01-01 PROCEDURE — 85730 THROMBOPLASTIN TIME PARTIAL: CPT

## 2025-01-01 PROCEDURE — 85025 COMPLETE CBC W/AUTO DIFF WBC: CPT

## 2025-01-01 PROCEDURE — 71275 CT ANGIOGRAPHY CHEST: CPT

## 2025-01-01 PROCEDURE — 80053 COMPREHEN METABOLIC PANEL: CPT

## 2025-01-01 PROCEDURE — 70450 CT HEAD/BRAIN W/O DYE: CPT

## 2025-01-01 PROCEDURE — 94002 VENT MGMT INPAT INIT DAY: CPT

## 2025-01-01 PROCEDURE — 85610 PROTHROMBIN TIME: CPT

## 2025-01-01 RX ORDER — HALOPERIDOL 5 MG/ML
2 INJECTION INTRAMUSCULAR EVERY 6 HOURS PRN
Status: DISCONTINUED | OUTPATIENT
Start: 2025-01-01 | End: 2025-01-01

## 2025-01-01 RX ORDER — MORPHINE SULFATE 2 MG/ML
2 INJECTION, SOLUTION INTRAMUSCULAR; INTRAVENOUS
Refills: 0 | Status: DISCONTINUED | OUTPATIENT
Start: 2025-01-01 | End: 2025-01-01

## 2025-01-01 RX ORDER — SODIUM CHLORIDE 0.9 % (FLUSH) 0.9 %
5-40 SYRINGE (ML) INJECTION EVERY 12 HOURS SCHEDULED
Status: DISCONTINUED | OUTPATIENT
Start: 2025-01-01 | End: 2025-01-01 | Stop reason: HOSPADM

## 2025-01-01 RX ORDER — MORPHINE SULFATE 2 MG/ML
2 INJECTION, SOLUTION INTRAMUSCULAR; INTRAVENOUS
Status: DISCONTINUED | OUTPATIENT
Start: 2025-01-01 | End: 2025-01-01 | Stop reason: HOSPADM

## 2025-01-01 RX ORDER — ROCURONIUM BROMIDE 10 MG/ML
80 INJECTION, SOLUTION INTRAVENOUS ONCE
Status: COMPLETED | OUTPATIENT
Start: 2025-01-01 | End: 2025-01-01

## 2025-01-01 RX ORDER — FENTANYL CITRATE 50 UG/ML
INJECTION, SOLUTION INTRAMUSCULAR; INTRAVENOUS
Status: COMPLETED
Start: 2025-01-01 | End: 2025-01-01

## 2025-01-01 RX ORDER — 0.9 % SODIUM CHLORIDE 0.9 %
1000 INTRAVENOUS SOLUTION INTRAVENOUS ONCE
Status: COMPLETED | OUTPATIENT
Start: 2025-01-01 | End: 2025-01-01

## 2025-01-01 RX ORDER — DEXTROSE MONOHYDRATE 100 MG/ML
INJECTION, SOLUTION INTRAVENOUS CONTINUOUS PRN
Status: DISCONTINUED | OUTPATIENT
Start: 2025-01-01 | End: 2025-01-01 | Stop reason: HOSPADM

## 2025-01-01 RX ORDER — IPRATROPIUM BROMIDE AND ALBUTEROL SULFATE 2.5; .5 MG/3ML; MG/3ML
1 SOLUTION RESPIRATORY (INHALATION) ONCE
Status: COMPLETED | OUTPATIENT
Start: 2025-01-01 | End: 2025-01-01

## 2025-01-01 RX ORDER — ACETAMINOPHEN 325 MG/1
650 TABLET ORAL EVERY 6 HOURS PRN
Status: DISCONTINUED | OUTPATIENT
Start: 2025-01-01 | End: 2025-01-01 | Stop reason: HOSPADM

## 2025-01-01 RX ORDER — MORPHINE SULFATE 4 MG/ML
4 INJECTION, SOLUTION INTRAMUSCULAR; INTRAVENOUS
Refills: 0 | Status: DISCONTINUED | OUTPATIENT
Start: 2025-01-01 | End: 2025-01-01

## 2025-01-01 RX ORDER — SODIUM CHLORIDE 0.9 % (FLUSH) 0.9 %
5-40 SYRINGE (ML) INJECTION PRN
Status: DISCONTINUED | OUTPATIENT
Start: 2025-01-01 | End: 2025-01-01 | Stop reason: HOSPADM

## 2025-01-01 RX ORDER — SODIUM CHLORIDE 9 MG/ML
INJECTION, SOLUTION INTRAVENOUS PRN
Status: DISCONTINUED | OUTPATIENT
Start: 2025-01-01 | End: 2025-01-01 | Stop reason: HOSPADM

## 2025-01-01 RX ORDER — GLUCAGON 1 MG/ML
1 KIT INJECTION PRN
Status: DISCONTINUED | OUTPATIENT
Start: 2025-01-01 | End: 2025-01-01 | Stop reason: HOSPADM

## 2025-01-01 RX ORDER — PROPOFOL 10 MG/ML
5-50 INJECTION, EMULSION INTRAVENOUS CONTINUOUS
Status: DISCONTINUED | OUTPATIENT
Start: 2025-01-01 | End: 2025-01-01

## 2025-01-01 RX ORDER — FENTANYL CITRATE 50 UG/ML
50 INJECTION, SOLUTION INTRAMUSCULAR; INTRAVENOUS ONCE
Refills: 0 | Status: COMPLETED | OUTPATIENT
Start: 2025-01-01 | End: 2025-01-01

## 2025-01-01 RX ORDER — ACETAMINOPHEN 650 MG/1
650 SUPPOSITORY RECTAL EVERY 6 HOURS PRN
Status: DISCONTINUED | OUTPATIENT
Start: 2025-01-01 | End: 2025-01-01 | Stop reason: HOSPADM

## 2025-01-01 RX ORDER — GLYCOPYRROLATE 0.2 MG/ML
0.2 INJECTION INTRAMUSCULAR; INTRAVENOUS EVERY 4 HOURS PRN
Status: DISCONTINUED | OUTPATIENT
Start: 2025-01-01 | End: 2025-01-01 | Stop reason: HOSPADM

## 2025-01-01 RX ORDER — FENTANYL CITRATE 50 UG/ML
25 INJECTION, SOLUTION INTRAMUSCULAR; INTRAVENOUS
Status: DISCONTINUED | OUTPATIENT
Start: 2025-01-01 | End: 2025-01-01

## 2025-01-01 RX ORDER — MORPHINE SULFATE 2 MG/ML
1 INJECTION, SOLUTION INTRAMUSCULAR; INTRAVENOUS
Status: DISCONTINUED | OUTPATIENT
Start: 2025-01-01 | End: 2025-01-01 | Stop reason: HOSPADM

## 2025-01-01 RX ORDER — PROPOFOL 10 MG/ML
INJECTION, EMULSION INTRAVENOUS
Status: COMPLETED
Start: 2025-01-01 | End: 2025-01-01

## 2025-01-01 RX ORDER — IOPAMIDOL 755 MG/ML
75 INJECTION, SOLUTION INTRAVASCULAR
Status: COMPLETED | OUTPATIENT
Start: 2025-01-01 | End: 2025-01-01

## 2025-01-01 RX ORDER — POLYETHYLENE GLYCOL 3350 17 G/17G
17 POWDER, FOR SOLUTION ORAL DAILY PRN
Status: DISCONTINUED | OUTPATIENT
Start: 2025-01-01 | End: 2025-01-01 | Stop reason: HOSPADM

## 2025-01-01 RX ORDER — ETOMIDATE 2 MG/ML
20 INJECTION INTRAVENOUS ONCE
Status: COMPLETED | OUTPATIENT
Start: 2025-01-01 | End: 2025-01-01

## 2025-01-01 RX ORDER — LORAZEPAM 2 MG/ML
1 INJECTION INTRAMUSCULAR
Status: DISCONTINUED | OUTPATIENT
Start: 2025-01-01 | End: 2025-01-01 | Stop reason: HOSPADM

## 2025-01-01 RX ADMIN — PROPOFOL 15 MCG/KG/MIN: 10 INJECTION, EMULSION INTRAVENOUS at 12:15

## 2025-01-01 RX ADMIN — FENTANYL CITRATE 25 MCG: 50 INJECTION, SOLUTION INTRAMUSCULAR; INTRAVENOUS at 21:01

## 2025-01-01 RX ADMIN — GLYCOPYRROLATE 0.2 MG: 0.2 INJECTION INTRAMUSCULAR; INTRAVENOUS at 02:50

## 2025-01-01 RX ADMIN — LORAZEPAM 1 MG: 2 INJECTION INTRAMUSCULAR; INTRAVENOUS at 18:26

## 2025-01-01 RX ADMIN — SODIUM CHLORIDE, PRESERVATIVE FREE 10 ML: 5 INJECTION INTRAVENOUS at 09:12

## 2025-01-01 RX ADMIN — FENTANYL CITRATE 25 MCG: 50 INJECTION, SOLUTION INTRAMUSCULAR; INTRAVENOUS at 00:58

## 2025-01-01 RX ADMIN — MORPHINE SULFATE 2 MG: 2 INJECTION, SOLUTION INTRAMUSCULAR; INTRAVENOUS at 19:24

## 2025-01-01 RX ADMIN — MORPHINE SULFATE 2 MG: 2 INJECTION, SOLUTION INTRAMUSCULAR; INTRAVENOUS at 21:03

## 2025-01-01 RX ADMIN — FENTANYL CITRATE 25 MCG: 50 INJECTION, SOLUTION INTRAMUSCULAR; INTRAVENOUS at 09:11

## 2025-01-01 RX ADMIN — FENTANYL CITRATE 25 MCG: 50 INJECTION, SOLUTION INTRAMUSCULAR; INTRAVENOUS at 04:24

## 2025-01-01 RX ADMIN — LORAZEPAM 1 MG: 2 INJECTION INTRAMUSCULAR; INTRAVENOUS at 15:47

## 2025-01-01 RX ADMIN — SODIUM CHLORIDE 1000 ML: 0.9 INJECTION, SOLUTION INTRAVENOUS at 16:27

## 2025-01-01 RX ADMIN — FENTANYL CITRATE 50 MCG: 50 INJECTION, SOLUTION INTRAMUSCULAR; INTRAVENOUS at 20:06

## 2025-01-01 RX ADMIN — GLYCOPYRROLATE 0.2 MG: 0.2 INJECTION INTRAMUSCULAR; INTRAVENOUS at 23:00

## 2025-01-01 RX ADMIN — MORPHINE SULFATE 2 MG: 2 INJECTION, SOLUTION INTRAMUSCULAR; INTRAVENOUS at 14:33

## 2025-01-01 RX ADMIN — IPRATROPIUM BROMIDE AND ALBUTEROL SULFATE 1 DOSE: 2.5; .5 SOLUTION RESPIRATORY (INHALATION) at 12:33

## 2025-01-01 RX ADMIN — GLYCOPYRROLATE 0.2 MG: 0.2 INJECTION INTRAMUSCULAR; INTRAVENOUS at 06:50

## 2025-01-01 RX ADMIN — ROCURONIUM BROMIDE 80 MG: 10 INJECTION, SOLUTION INTRAVENOUS at 12:09

## 2025-01-01 RX ADMIN — GLYCOPYRROLATE 0.2 MG: 0.2 INJECTION INTRAMUSCULAR; INTRAVENOUS at 18:22

## 2025-01-01 RX ADMIN — FENTANYL CITRATE 25 MCG: 50 INJECTION, SOLUTION INTRAMUSCULAR; INTRAVENOUS at 06:24

## 2025-01-01 RX ADMIN — LORAZEPAM 1 MG: 2 INJECTION INTRAMUSCULAR; INTRAVENOUS at 08:05

## 2025-01-01 RX ADMIN — VANCOMYCIN HYDROCHLORIDE 1500 MG: 500 INJECTION, POWDER, LYOPHILIZED, FOR SOLUTION INTRAVENOUS at 14:12

## 2025-01-01 RX ADMIN — HALOPERIDOL LACTATE 2 MG: 5 INJECTION, SOLUTION INTRAMUSCULAR at 06:50

## 2025-01-01 RX ADMIN — IOPAMIDOL 75 ML: 755 INJECTION, SOLUTION INTRAVENOUS at 14:57

## 2025-01-01 RX ADMIN — FENTANYL CITRATE 25 MCG: 50 INJECTION, SOLUTION INTRAMUSCULAR; INTRAVENOUS at 23:02

## 2025-01-01 RX ADMIN — LORAZEPAM 1 MG: 2 INJECTION INTRAMUSCULAR; INTRAVENOUS at 22:53

## 2025-01-01 RX ADMIN — MORPHINE SULFATE 2 MG: 2 INJECTION, SOLUTION INTRAMUSCULAR; INTRAVENOUS at 10:51

## 2025-01-01 RX ADMIN — FENTANYL CITRATE 50 MCG: 50 INJECTION, SOLUTION INTRAMUSCULAR; INTRAVENOUS at 02:43

## 2025-01-01 RX ADMIN — HALOPERIDOL LACTATE 2 MG: 5 INJECTION, SOLUTION INTRAMUSCULAR at 00:58

## 2025-01-01 RX ADMIN — LORAZEPAM 1 MG: 2 INJECTION INTRAMUSCULAR; INTRAVENOUS at 04:52

## 2025-01-01 RX ADMIN — WATER 125 MG: 1 INJECTION INTRAMUSCULAR; INTRAVENOUS; SUBCUTANEOUS at 14:03

## 2025-01-01 RX ADMIN — ETOMIDATE 20 MG: 2 INJECTION, SOLUTION INTRAVENOUS at 12:08

## 2025-01-01 RX ADMIN — CEFEPIME 2000 MG: 2 INJECTION, POWDER, FOR SOLUTION INTRAVENOUS at 16:25

## 2025-01-01 RX ADMIN — MORPHINE SULFATE 2 MG: 2 INJECTION, SOLUTION INTRAMUSCULAR; INTRAVENOUS at 00:46

## 2025-01-01 RX ADMIN — MORPHINE SULFATE 2 MG: 2 INJECTION, SOLUTION INTRAMUSCULAR; INTRAVENOUS at 18:17

## 2025-01-01 ASSESSMENT — PAIN SCALES - GENERAL
PAINLEVEL_OUTOF10: 6
PAINLEVEL_OUTOF10: 3
PAINLEVEL_OUTOF10: 6
PAINLEVEL_OUTOF10: 3

## 2025-01-01 ASSESSMENT — PULMONARY FUNCTION TESTS
PIF_VALUE: 45
PIF_VALUE: 45
PIF_VALUE: 42

## 2025-01-06 ENCOUNTER — OFFICE VISIT (OUTPATIENT)
Dept: ORTHOPEDIC SURGERY | Age: 80
End: 2025-01-06

## 2025-01-06 VITALS — WEIGHT: 140 LBS | HEIGHT: 66 IN | BODY MASS INDEX: 22.5 KG/M2

## 2025-01-06 DIAGNOSIS — M17.11 PRIMARY OSTEOARTHRITIS OF RIGHT KNEE: Primary | ICD-10-CM

## 2025-01-06 NOTE — PROGRESS NOTES
Chief Complaint   Patient presents with    Knee Pain     Patient presents today for right knee. Pain is described as \"grabbing\" and under neath his knee cap. Pain is rated as a 5/10. Patient is using bio freeze and tylenol arthritis for relief.        Mickey Dakin returns today for follow-up of his right knee pain. He has difficulties performing ADL's.      History reviewed. No pertinent past medical history.  No past surgical history on file.    Current Outpatient Medications:     clopidogrel (PLAVIX) 75 MG tablet, TAKE 1 TABLET BY MOUTH ONCE DAILY, Disp: , Rfl:     ezetimibe-simvastatin (VYTORIN) 10-20 MG per tablet, TAKE 1 TABLET BY MOUTH ONCE DAILY, Disp: , Rfl:     felodipine (PLENDIL) 10 MG extended release tablet, TAKE 1 TABLET BY MOUTH ONCE DAILY, Disp: , Rfl:     olmesartan (BENICAR) 20 MG tablet, TAKE 1 TABLET BY MOUTH ONCE DAILY, Disp: , Rfl:   No Known Allergies  Social History     Socioeconomic History    Marital status:      Spouse name: Not on file    Number of children: Not on file    Years of education: Not on file    Highest education level: Not on file   Occupational History    Not on file   Tobacco Use    Smoking status: Not on file    Smokeless tobacco: Not on file   Substance and Sexual Activity    Alcohol use: Not on file    Drug use: Not on file    Sexual activity: Not on file   Other Topics Concern    Not on file   Social History Narrative    Not on file     Social Determinants of Health     Financial Resource Strain: Not on file   Food Insecurity: Not on file   Transportation Needs: Not on file   Physical Activity: Not on file   Stress: Not on file   Social Connections: Not on file   Intimate Partner Violence: Not on file   Housing Stability: Not on file     No family history on file.    Review of Systems:     Skin: (-) rash,(-) psoriasis,(-) eczema, (-)skin cancer.   Musculoskeletal: (-) fractures,  (-) dislocations,(-) collagen vascular disease, (-) fibromyalgia, (-) multiple

## 2025-01-12 RX ORDER — TRIAMCINOLONE ACETONIDE 40 MG/ML
40 INJECTION, SUSPENSION INTRA-ARTICULAR; INTRAMUSCULAR ONCE
Status: COMPLETED | OUTPATIENT
Start: 2025-01-12 | End: 2025-01-12

## 2025-01-12 RX ADMIN — TRIAMCINOLONE ACETONIDE 40 MG: 40 INJECTION, SUSPENSION INTRA-ARTICULAR; INTRAMUSCULAR at 20:21

## 2025-01-16 ENCOUNTER — HOSPITAL ENCOUNTER (OUTPATIENT)
Dept: VASCULAR MEDICINE | Facility: HOSPITAL | Age: 80
Discharge: HOME | End: 2025-01-16
Payer: MEDICARE

## 2025-01-16 ENCOUNTER — TELEPHONE (OUTPATIENT)
Dept: CARDIOLOGY | Facility: HOSPITAL | Age: 80
End: 2025-01-16

## 2025-01-16 ENCOUNTER — HOSPITAL ENCOUNTER (OUTPATIENT)
Dept: RADIOLOGY | Facility: HOSPITAL | Age: 80
Discharge: HOME | End: 2025-01-16
Payer: MEDICARE

## 2025-01-16 ENCOUNTER — HOSPITAL ENCOUNTER (OUTPATIENT)
Dept: CARDIOLOGY | Facility: HOSPITAL | Age: 80
Discharge: HOME | End: 2025-01-16
Payer: MEDICARE

## 2025-01-16 ENCOUNTER — HOSPITAL ENCOUNTER (OUTPATIENT)
Dept: RESPIRATORY THERAPY | Facility: HOSPITAL | Age: 80
Discharge: HOME | End: 2025-01-16
Payer: MEDICARE

## 2025-01-16 DIAGNOSIS — I25.10 CORONARY ARTERY DISEASE INVOLVING NATIVE HEART, UNSPECIFIED VESSEL OR LESION TYPE, UNSPECIFIED WHETHER ANGINA PRESENT: ICD-10-CM

## 2025-01-16 DIAGNOSIS — R09.89 OTHER SPECIFIED SYMPTOMS AND SIGNS INVOLVING THE CIRCULATORY AND RESPIRATORY SYSTEMS: ICD-10-CM

## 2025-01-16 DIAGNOSIS — Z86.73 HISTORY OF STROKE: ICD-10-CM

## 2025-01-16 DIAGNOSIS — Z01.810 ENCOUNTER FOR PREPROCEDURAL CARDIOVASCULAR EXAMINATION: ICD-10-CM

## 2025-01-16 DIAGNOSIS — F17.210 CIGARETTE SMOKER: ICD-10-CM

## 2025-01-16 LAB
AORTIC VALVE MEAN GRADIENT: 27 MMHG
AORTIC VALVE PEAK VELOCITY: 3.55 M/S
AV PEAK GRADIENT: 50 MMHG
AVA (PEAK VEL): 1.28 CM2
AVA (VTI): 1.36 CM2
EJECTION FRACTION APICAL 4 CHAMBER: 71.4
EJECTION FRACTION: 62 %
LEFT ATRIUM VOLUME AREA LENGTH INDEX BSA: 12.2 ML/M2
LEFT VENTRICLE INTERNAL DIMENSION DIASTOLE: 4.15 CM (ref 3.5–6)
LEFT VENTRICULAR OUTFLOW TRACT DIAMETER: 1.99 CM
LV EJECTION FRACTION BIPLANE: 62 %
MITRAL VALVE E/A RATIO: 0.68
MITRAL VALVE E/E' RATIO: 7.55
RIGHT VENTRICLE FREE WALL PEAK S': 15.36 CM/S
RIGHT VENTRICLE PEAK SYSTOLIC PRESSURE: 40.2 MMHG
TRICUSPID ANNULAR PLANE SYSTOLIC EXCURSION: 2.2 CM

## 2025-01-16 PROCEDURE — 94726 PLETHYSMOGRAPHY LUNG VOLUMES: CPT

## 2025-01-16 PROCEDURE — 71250 CT THORAX DX C-: CPT

## 2025-01-16 PROCEDURE — 93306 TTE W/DOPPLER COMPLETE: CPT

## 2025-01-16 PROCEDURE — 93306 TTE W/DOPPLER COMPLETE: CPT | Performed by: INTERNAL MEDICINE

## 2025-01-16 PROCEDURE — 93880 EXTRACRANIAL BILAT STUDY: CPT | Performed by: SURGERY

## 2025-01-16 PROCEDURE — 2500000001 HC RX 250 WO HCPCS SELF ADMINISTERED DRUGS (ALT 637 FOR MEDICARE OP): Performed by: STUDENT IN AN ORGANIZED HEALTH CARE EDUCATION/TRAINING PROGRAM

## 2025-01-16 PROCEDURE — 94640 AIRWAY INHALATION TREATMENT: CPT

## 2025-01-16 PROCEDURE — 93970 EXTREMITY STUDY: CPT | Performed by: SURGERY

## 2025-01-16 PROCEDURE — 93970 EXTREMITY STUDY: CPT

## 2025-01-16 PROCEDURE — 93880 EXTRACRANIAL BILAT STUDY: CPT

## 2025-01-16 RX ORDER — ALBUTEROL SULFATE 90 UG/1
1 INHALANT RESPIRATORY (INHALATION) ONCE
Status: COMPLETED | OUTPATIENT
Start: 2025-01-16 | End: 2025-01-16

## 2025-01-16 RX ORDER — ALBUTEROL SULFATE 0.83 MG/ML
3 SOLUTION RESPIRATORY (INHALATION) ONCE
Status: COMPLETED | OUTPATIENT
Start: 2025-01-16 | End: 2025-01-16

## 2025-01-16 RX ADMIN — ALBUTEROL SULFATE 1 PUFF: 90 AEROSOL, METERED RESPIRATORY (INHALATION) at 11:26

## 2025-01-16 NOTE — TELEPHONE ENCOUNTER
1/16/25  1554  Sent message to Dr. Steiner.    Also called her office and left message of the echocardiogram being abnormal with echodensity seen on tricuspid valve.  Message was given to Shirin.      ----- Message from Niko Chairez sent at 1/16/2025  3:00 PM EST -----  Please call and notify the ordering provider that echocardiogram was abnormal with echodensity seen on tricuspid valve.

## 2025-01-20 LAB
MGC ASCENT PFT - FEV1 - POST: 0.69
MGC ASCENT PFT - FEV1 - PRE: 0.59
MGC ASCENT PFT - FEV1 - PREDICTED: 2.43
MGC ASCENT PFT - FVC - POST: 2.81
MGC ASCENT PFT - FVC - PRE: 2.33
MGC ASCENT PFT - FVC - PREDICTED: 3.25

## 2025-01-24 ENCOUNTER — DOCUMENTATION (OUTPATIENT)
Dept: CARDIOLOGY | Facility: HOSPITAL | Age: 80
End: 2025-01-24
Payer: MEDICARE

## 2025-01-24 DIAGNOSIS — I35.9 AORTIC VALVE DISEASE: ICD-10-CM

## 2025-01-24 DIAGNOSIS — I25.10 CORONARY ARTERY DISEASE INVOLVING NATIVE HEART, UNSPECIFIED VESSEL OR LESION TYPE, UNSPECIFIED WHETHER ANGINA PRESENT: ICD-10-CM

## 2025-01-24 NOTE — PROGRESS NOTES
Coronary angiography was reviewed by interventional cardiology and cardiac surgery teams at the high risk coronary intervention meeting on  1/24/2025  . Decision was made to pursue consultation with structural heart team and vascular surgery team regarding safety of TAVR in setting of AAA. Depending on the outcome of these discussions the treatment options will most likely involve a hybrid approach including MID CAB CX, RCA, PCI LAD and TAVR. Dr. Steiner will update the CHIP team on final outcome.     Physicians in attendance:  Bernardo Cortese MD Sarah Eapen MD Jun LI MD Carl Gillombardo MD Geetha Mohan MD Marc Pelletier MD Mohammad El Diesty MD Farshad Forouzandeh MD

## 2025-01-29 ENCOUNTER — TELEMEDICINE (OUTPATIENT)
Dept: CARDIAC SURGERY | Facility: CLINIC | Age: 80
End: 2025-01-29
Payer: MEDICARE

## 2025-01-29 DIAGNOSIS — I25.10 CORONARY ARTERY DISEASE INVOLVING NATIVE HEART, UNSPECIFIED VESSEL OR LESION TYPE, UNSPECIFIED WHETHER ANGINA PRESENT: ICD-10-CM

## 2025-01-29 DIAGNOSIS — I35.9 AORTIC VALVE DISEASE: ICD-10-CM

## 2025-01-29 DIAGNOSIS — I20.0 UNSTABLE ANGINA (MULTI): Primary | ICD-10-CM

## 2025-01-29 PROCEDURE — 99212 OFFICE O/P EST SF 10 MIN: CPT | Performed by: STUDENT IN AN ORGANIZED HEALTH CARE EDUCATION/TRAINING PROGRAM

## 2025-01-29 NOTE — PROGRESS NOTES
Chief Complaint  Multivessel coronary artery disease    HPI:  Mickey Dakin is a 79-year-old male who was initially evaluated by Dr. Arias for a 5.3cm AAA. In anticipation of open AAA repair, he underwent a cardiac cath, which demonstrated multivessel CAD. He was referred to cardiac surgery and seen in my clinic on 12/11/24. At that time, preoperative testing was ordered in anticipation of potential CABG. CT Chest was significant for a heavily calcified ascending aorta precluding cross clamp for CABG. TTE demonstrated moderate aortic stenosis and regurgitation (PG 50 / MG 27). PFTs were consistent with severe COPD. His case was presented at our multidisciplinary CHIP meeting on 1/24/25 and again at a multidisciplinary meeting on 1/28/25. His case was discussed with Dr. Linda and Dr. Gillombardo with interventional cardiology, Dr. Damir Sutherland with minimally invasive cardiac surgery, Dr. Arias with vascular surgery, and Dr. Crockett with structural heart cardiology.     The patient is a high risk candidate for MIDCAB per Dr. Damir Sutherland. Plan to repeat a cardiac cath with FFR of the LAD by Dr. Gillombardo. If the LAD stenosis is hemodynamically significant, he would plan for PCI of the LAD, LCx, and RCA.  The patient would remain on DAPT for 6 months following PCI then transition to aspirin alone. At that time, Dr. Arias would proceed with open AAA repair. He would be evaluated by Dr. Crockett for alternate access TAVR as an outpatient following recovery from his open AAA repair.     Mr. Dakin, his wife and daughter were updated on the recommendations of the multidisciplinary team in a telemedicine visit today.     Past Medical History:   Diagnosis Date    Abdominal aortic aneurysm (AAA) (CMS-[facility])     CVA (cerebral vascular accident) (Multi) 1994    Hypertension        Past Surgical History:   Procedure Laterality Date    CARDIAC CATHETERIZATION N/A 12/5/2024    Procedure: Left Heart Cath with Coronary Angiography and LV;   Surgeon: Carl B Gillombardo, MD;  Location: [facility] Cardiac Cath Lab;  Service: Cardiovascular;  Laterality: N/A;  [facility] THURS DEC 5TH, 2024 AT 9:00 AM PT WILL ARRIVE AT 7:30 AM AT [facility] DR. GILLOMBARDO       Family History   Problem Relation Name Age of Onset    Aortic aneurysm Neg Hx         Social History     Socioeconomic History    Marital status:      Spouse name: Not on file    Number of children: Not on file    Years of education: Not on file    Highest education level: Not on file   Occupational History    Not on file   Tobacco Use    Smoking status: Every Day     Current packs/day: 0.50     Average packs/day: 0.5 packs/day for 60.1 years (30.0 ttl pk-yrs)     Types: Cigarettes     Start date: 01/1965    Smokeless tobacco: Never   Substance and Sexual Activity    Alcohol use: Never    Drug use: Never    Sexual activity: Not on file   Other Topics Concern    Not on file   Social History Narrative    Not on file     Social Drivers of Health     Financial Resource Strain: Not on file   Food Insecurity: Not on file   Transportation Needs: Not on file   Physical Activity: Not on file   Stress: Not on file   Social Connections: Not on file   Intimate Partner Violence: Not on file   Housing Stability: Not on file       No Known Allergies    Outpatient Encounter Medications as of 1/29/2025   Medication Sig Dispense Refill    aspirin 81 mg EC tablet Take 1 tablet (81 mg) by mouth once daily.      clopidogrel (Plavix) 75 mg tablet Take 1 tablet (75 mg) by mouth early in the morning..      ezetimibe (Zetia) 10 mg tablet Take 1 tablet (10 mg) by mouth once daily. 90 tablet 0    felodipine ER (Plendil) 10 mg 24 hr tablet Take 1 tablet (10 mg) by mouth early in the morning..      olmesartan (BENIcar) 20 mg tablet Take 1 tablet (20 mg) by mouth early in the morning..      rosuvastatin (Crestor) 40 mg tablet Take 1 tablet (40 mg) by mouth once daily. 90 tablet 0     No facility-administered encounter medications on file  "as of 1/29/2025.       Physical Exam  Deferred due to telemedicine encounter     No results found for this or any previous visit (from the past 4464 hours).    No results found for: \"WBC\", \"HGB\", \"HCT\", \"MCV\", \"PLT\"  Lab Results   Component Value Date    GLUCOSE 98 12/05/2024    CALCIUM 9.6 12/05/2024     (L) 12/05/2024    K 4.8 12/05/2024    CO2 31 12/05/2024    CL 99 12/05/2024    BUN 9 12/05/2024    CREATININE 0.55 12/05/2024     Transthoracic Echo (TTE) Complete    Result Date: 1/16/2025              [place] [id number]      Phone [phone] Fax [phone] TRANSTHORACIC ECHOCARDIOGRAM REPORT Patient Name:       MICKEY DAKIN        Paolo Physician:    [id number] Niko Chairez DO Study Date:         1/16/2025           Ordering Provider:    [id number] FÁTIMA JOHNS MRN/PID:            [id number]            Fellow: Accession#:         [id number]        Nurse: Date of Birth/Age:  1945 / 79      Sonographer:          Marissa Zabala RDCS                     years Gender Assigned at  M                   Additional Staff: Birth: Height:             167.64 cm           Admit Date:           1/16/2025 Weight:             55.79 kg            Admission Status:     Outpatient BSA / BMI:          1.63 m2 / 19.85     Department Location:  [facility] Echo                     kg/m2                                     Lab Blood Pressure: 106 /73 mmHg Study Type:    TRANSTHORACIC ECHO (TTE) COMPLETE Diagnosis/ICD: Atherosclerotic heart disease of native coronary artery without                angina pectoris-I25.10 Indication:    CAD CPT Codes:     Echo Complete w Full [id number] Patient History: Pertinent History: CAD. Study Detail: The following Echo studies were performed: 2D, M-Mode, Doppler and               color flow.  PHYSICIAN INTERPRETATION: Left " Ventricle: The left ventricular systolic function is normal, with a calculated ejection fraction of 62% by auto EF. There are no regional wall motion abnormalities. The left ventricular cavity size is normal. There is normal septal and normal posterior left ventricular wall thickness. Spectral Doppler shows a normal pattern of left ventricular diastolic filling. Left Atrium: The left atrium is normal in size. Right Ventricle: The right ventricle is normal in size. There is normal right ventricular global systolic function. Right Atrium: The right atrium is normal in size. Aortic Valve: The aortic valve is trileaflet. There is moderate aortic valve cusp calcification. There is moderate aortic valve thickening. There is evidence of moderate aortic valve stenosis. The aortic valve dimensionless index is 0.44. There is moderate aortic valve regurgitation. The peak instantaneous gradient of the aortic valve is 50 mmHg. The mean gradient of the aortic valve is 27 mmHg. Mitral Valve: The mitral valve is normal in structure. There is mild mitral annular calcification. There is no evidence of mitral valve regurgitation. Tricuspid Valve: The tricuspid valve is structurally normal. There is mild to moderate tricuspid regurgitation. The Doppler estimated RVSP is mildly elevated right ventricular systolic pressure at 40.2 mmHg. Small mobile echodensity seen attached to the tricuspid valve. Recommend additional imaging such as YI for further evaluation. Pulmonic Valve: The pulmonic valve is structurally normal. There is trace pulmonic valve regurgitation. Pericardium: No pericardial effusion noted. Aorta: The aortic root is normal.  CONCLUSIONS:  1. The left ventricular systolic function is normal, with a calculated ejection fraction of 62% by auto EF.  2. There is normal right ventricular global systolic function.  3. Mild to moderate tricuspid regurgitation.  4. Mildly elevated right ventricular systolic pressure.  5. Small  mobile echodensity seen attached to the tricuspid valve. Recommend additional imaging such as YI for further evaluation.  6. Moderate aortic valve stenosis.  7. There is moderate aortic valve cusp calcification.  8. Moderate aortic valve regurgitation. QUANTITATIVE DATA SUMMARY:  2D MEASUREMENTS:           Normal Ranges: IVSd:            0.55 cm   (0.6-1.1cm) LVPWd:           0.70 cm   (0.6-1.1cm) LVIDd:           4.15 cm   (3.9-5.9cm) LVIDs:           3.25 cm LV Mass Index:   44.7 g/m2 LV % FS          21.7 %  LA VOLUME:                    Normal Ranges: LA Vol A4C:        19.3 ml    (22+/-6mL/m2) LA Vol A2C:        13.9 ml LA Vol BP:         19.8 ml LA Vol Index A4C:  11.9ml/m2 LA Vol Index A2C:  8.5 ml/m2 LA Vol Index BP:   12.2 ml/m2 LA Area A4C:       10.0 cm2 LA Area A2C:       7.0 cm2 LA Major Axis A4C: 4.4 cm LA Major Axis A2C: 3.0 cm LA Volume Index:   12.2 ml/m2 LA Vol A4C:        17.4 ml LA Vol A2C:        13.1 ml LA Vol Index BSA:  9.4 ml/m2  RA VOLUME BY A/L METHOD:          Normal Ranges: RA Area A4C:             13.2 cm2  AORTA MEASUREMENTS:         Normal Ranges: Ao Sinus, d:        3.10 cm (2.1-3.5cm) Ao STJ, d:          2.70 cm (1.7-3.4cm) Asc Ao, d:          2.70 cm (2.1-3.4cm)  LV SYSTOLIC FUNCTION BY 2D PLANIMETRY (MOD):                      Normal Ranges: EF-A4C View:    71 % (>=55%) EF-A2C View:    50 % EF-Biplane:     62 % EF-Auto:        62 % LV EF Reported: 62 %  LV DIASTOLIC FUNCTION:           Normal Ranges: MV Peak E:             0.78 m/s  (0.7-1.2 m/s) MV Peak A:             1.15 m/s  (0.42-0.7 m/s) E/A Ratio:             0.68      (1.0-2.2) MV e'                  0.102 m/s (>8.0) MV lateral e'          0.13 m/s MV medial e'           0.08 m/s E/e' Ratio:            7.65      (<8.0)  MITRAL VALVE:          Normal Ranges: MV DT:        284 msec (150-240msec)  AORTIC VALVE:                      Normal Ranges: AoV Vmax:                3.55 m/s  (<=1.7m/s) AoV Peak P.5  mmHg (<20mmHg) AoV Mean P.9 mmHg (1.7-11.5mmHg) LVOT Max Silvio:            1.46 m/s  (<=1.1m/s) AoV VTI:                 69.31 cm  (18-25cm) LVOT VTI:                30.31 cm LVOT Diameter:           1.99 cm   (1.8-2.4cm) AoV Area, VTI:           1.36 cm2  (2.5-5.5cm2) AoV Area,Vmax:           1.28 cm2  (2.5-4.5cm2) AoV Dimensionless Index: 0.44  AORTIC INSUFFICIENCY: AI Vmax:       4.23 m/s AI Half-time:  401 msec AI Decel Time: 1382 msec AI Decel Rate: 317.85 cm/s2  RIGHT VENTRICLE: RV Basal 2.82 cm RV Mid   1.60 cm RV Major 6.3 cm TAPSE:   22.0 mm RV s'    0.15 m/s  TRICUSPID VALVE/RVSP:          Normal Ranges: Peak TR Velocity:     3.05 m/s RV Syst Pressure:     40 mmHg  (< 30mmHg) IVC Diam:             1.13 cm TV e'                 0.1 m/s  AORTA: Asc Ao Diam 2.74 cm  [id number] Niko Chairez DO Electronically signed on 2025 at 3:01:18 PM  ** Final **       Assessment and Plan:   Mickey Dakin is a 79-year-old male with a 5.3cm AAA, multivessel CAD, and moderate aortic stenosis and regurgitation   - The patient is a high risk candidate for MIDCAB per Dr. Damir Sutherland   - Plan for repeat a cardiac cath with FFR of the LAD by Dr. Gillombardo. If the LAD stenosis is hemodynamically significant, he would plan for PCI of the LAD, LCx, and RCA.  - The patient would remain on DAPT for 6 months following PCI then transition to aspirin alone  - At that time, Dr. Arias would proceed with open AAA repair  - He would be evaluated by Dr. Crockett for alternate access TAVR as an outpatient following recovery from his open AAA repair. TAVR CT is ordered and pending.     He was advised to call our office with questions or concerns.     Thank you for the opportunity to participate in his care.     Annalee Steiner MD   Cardiac Surgeon

## 2025-01-30 ENCOUNTER — HOSPITAL ENCOUNTER (OUTPATIENT)
Dept: RADIOLOGY | Facility: HOSPITAL | Age: 80
End: 2025-01-30
Payer: MEDICARE

## 2025-01-30 PROBLEM — I20.0 UNSTABLE ANGINA (MULTI): Status: ACTIVE | Noted: 2025-01-29

## 2025-01-31 LAB
ANION GAP SERPL CALCULATED.4IONS-SCNC: 6 MMOL/L (CALC) (ref 7–17)
BUN SERPL-MCNC: 8 MG/DL (ref 7–25)
BUN/CREAT SERPL: 13 (CALC) (ref 6–22)
CALCIUM SERPL-MCNC: 9.3 MG/DL (ref 8.6–10.3)
CHLORIDE SERPL-SCNC: 101 MMOL/L (ref 98–110)
CO2 SERPL-SCNC: 29 MMOL/L (ref 20–32)
CREAT SERPL-MCNC: 0.64 MG/DL (ref 0.7–1.28)
EGFRCR SERPLBLD CKD-EPI 2021: 96 ML/MIN/1.73M2
GLUCOSE SERPL-MCNC: 96 MG/DL (ref 65–99)
POTASSIUM SERPL-SCNC: 4.9 MMOL/L (ref 3.5–5.3)
SODIUM SERPL-SCNC: 136 MMOL/L (ref 135–146)

## 2025-02-03 ENCOUNTER — TELEPHONE (OUTPATIENT)
Dept: CARDIOLOGY | Facility: CLINIC | Age: 80
End: 2025-02-03
Payer: COMMERCIAL

## 2025-02-03 ENCOUNTER — HOSPITAL ENCOUNTER (OUTPATIENT)
Dept: RADIOLOGY | Facility: HOSPITAL | Age: 80
Discharge: HOME | End: 2025-02-03
Payer: MEDICARE

## 2025-02-03 DIAGNOSIS — I25.10 CORONARY ARTERY DISEASE INVOLVING NATIVE HEART, UNSPECIFIED VESSEL OR LESION TYPE, UNSPECIFIED WHETHER ANGINA PRESENT: ICD-10-CM

## 2025-02-03 DIAGNOSIS — I35.9 AORTIC VALVE DISEASE: ICD-10-CM

## 2025-02-03 DIAGNOSIS — I71.40 ABDOMINAL AORTIC ANEURYSM (AAA) WITHOUT RUPTURE (CMS-HCC): ICD-10-CM

## 2025-02-03 DIAGNOSIS — I25.10 CORONARY ARTERY DISEASE INVOLVING NATIVE HEART: Primary | ICD-10-CM

## 2025-02-03 DIAGNOSIS — I20.0 UNSTABLE ANGINA (MULTI): ICD-10-CM

## 2025-02-03 PROCEDURE — 74174 CTA ABD&PLVS W/CONTRAST: CPT

## 2025-02-03 PROCEDURE — 2550000001 HC RX 255 CONTRASTS: Performed by: STUDENT IN AN ORGANIZED HEALTH CARE EDUCATION/TRAINING PROGRAM

## 2025-02-03 RX ADMIN — IOHEXOL 80 ML: 350 INJECTION, SOLUTION INTRAVENOUS at 11:41

## 2025-02-04 LAB
ANION GAP SERPL CALCULATED.4IONS-SCNC: 9 MMOL/L (CALC) (ref 7–17)
BUN SERPL-MCNC: 8 MG/DL (ref 7–25)
BUN/CREAT SERPL: 15 (CALC) (ref 6–22)
CALCIUM SERPL-MCNC: 9.1 MG/DL (ref 8.6–10.3)
CHLORIDE SERPL-SCNC: 98 MMOL/L (ref 98–110)
CO2 SERPL-SCNC: 28 MMOL/L (ref 20–32)
CREAT SERPL-MCNC: 0.53 MG/DL (ref 0.7–1.28)
EGFRCR SERPLBLD CKD-EPI 2021: 102 ML/MIN/1.73M2
ERYTHROCYTE [DISTWIDTH] IN BLOOD BY AUTOMATED COUNT: 12.4 % (ref 11–15)
GLUCOSE SERPL-MCNC: 87 MG/DL (ref 65–99)
HCT VFR BLD AUTO: 44.3 % (ref 38.5–50)
HGB BLD-MCNC: 14.8 G/DL (ref 13.2–17.1)
INR PPP: 1
MCH RBC QN AUTO: 33.6 PG (ref 27–33)
MCHC RBC AUTO-ENTMCNC: 33.4 G/DL (ref 32–36)
MCV RBC AUTO: 100.5 FL (ref 80–100)
PLATELET # BLD AUTO: 327 THOUSAND/UL (ref 140–400)
PMV BLD REES-ECKER: 9.1 FL (ref 7.5–12.5)
POTASSIUM SERPL-SCNC: 4.9 MMOL/L (ref 3.5–5.3)
PROTHROMBIN TIME: 10.7 SEC (ref 9–11.5)
RBC # BLD AUTO: 4.41 MILLION/UL (ref 4.2–5.8)
SODIUM SERPL-SCNC: 135 MMOL/L (ref 135–146)
WBC # BLD AUTO: 9.2 THOUSAND/UL (ref 3.8–10.8)

## 2025-02-11 ENCOUNTER — APPOINTMENT (OUTPATIENT)
Dept: RADIOLOGY | Facility: HOSPITAL | Age: 80
DRG: 321 | End: 2025-02-11
Payer: MEDICARE

## 2025-02-11 ENCOUNTER — APPOINTMENT (OUTPATIENT)
Dept: CARDIOLOGY | Facility: HOSPITAL | Age: 80
End: 2025-02-11
Payer: MEDICARE

## 2025-02-11 ENCOUNTER — APPOINTMENT (OUTPATIENT)
Dept: CARDIOLOGY | Facility: HOSPITAL | Age: 80
DRG: 321 | End: 2025-02-11
Payer: MEDICARE

## 2025-02-11 ENCOUNTER — HOSPITAL ENCOUNTER (INPATIENT)
Facility: HOSPITAL | Age: 80
LOS: 1 days | Discharge: ANOTHER HOSPITAL | DRG: 321 | End: 2025-02-11
Attending: INTERNAL MEDICINE | Admitting: INTERNAL MEDICINE
Payer: MEDICARE

## 2025-02-11 ENCOUNTER — HOSPITAL ENCOUNTER (INPATIENT)
Facility: HOSPITAL | Age: 80
LOS: 10 days | Discharge: NURSING, REHAB OR LONG-TERM CARE | End: 2025-02-21
Attending: STUDENT IN AN ORGANIZED HEALTH CARE EDUCATION/TRAINING PROGRAM | Admitting: STUDENT IN AN ORGANIZED HEALTH CARE EDUCATION/TRAINING PROGRAM
Payer: MEDICARE

## 2025-02-11 VITALS
OXYGEN SATURATION: 94 % | BODY MASS INDEX: 19.77 KG/M2 | HEART RATE: 108 BPM | SYSTOLIC BLOOD PRESSURE: 89 MMHG | HEIGHT: 66 IN | RESPIRATION RATE: 20 BRPM | TEMPERATURE: 97.8 F | WEIGHT: 122.99 LBS | DIASTOLIC BLOOD PRESSURE: 55 MMHG

## 2025-02-11 DIAGNOSIS — I25.10 CORONARY ARTERY DISEASE INVOLVING NATIVE CORONARY ARTERY OF NATIVE HEART WITHOUT ANGINA PECTORIS: ICD-10-CM

## 2025-02-11 DIAGNOSIS — I20.9 ANGINA PECTORIS, UNSPECIFIED: ICD-10-CM

## 2025-02-11 DIAGNOSIS — J44.9 CHRONIC OBSTRUCTIVE PULMONARY DISEASE, UNSPECIFIED COPD TYPE (MULTI): ICD-10-CM

## 2025-02-11 DIAGNOSIS — R09.81 NASAL CONGESTION: ICD-10-CM

## 2025-02-11 DIAGNOSIS — Z95.5 STATUS POST INSERTION OF DRUG ELUTING CORONARY ARTERY STENT: Primary | ICD-10-CM

## 2025-02-11 DIAGNOSIS — J96.01 ACUTE HYPOXIC RESPIRATORY FAILURE (MULTI): ICD-10-CM

## 2025-02-11 DIAGNOSIS — I25.42 DISSECTION OF CORONARY ARTERY: ICD-10-CM

## 2025-02-11 DIAGNOSIS — I31.39 OTHER PERICARDIAL EFFUSION (NONINFLAMMATORY) (HHS-HCC): ICD-10-CM

## 2025-02-11 DIAGNOSIS — S27.892A MEDIASTINAL HEMATOMA, INITIAL ENCOUNTER: Primary | ICD-10-CM

## 2025-02-11 DIAGNOSIS — I25.10 CORONARY ARTERY DISEASE INVOLVING NATIVE CORONARY ARTERY OF NATIVE HEART, UNSPECIFIED WHETHER ANGINA PRESENT: ICD-10-CM

## 2025-02-11 DIAGNOSIS — R52 PAIN: ICD-10-CM

## 2025-02-11 DIAGNOSIS — I35.0 MODERATE AORTIC STENOSIS: ICD-10-CM

## 2025-02-11 DIAGNOSIS — K59.00 CONSTIPATION, UNSPECIFIED CONSTIPATION TYPE: ICD-10-CM

## 2025-02-11 DIAGNOSIS — I71.43 INFRARENAL ABDOMINAL AORTIC ANEURYSM (AAA) WITHOUT RUPTURE (CMS-HCC): ICD-10-CM

## 2025-02-11 DIAGNOSIS — Z13.6 ENCOUNTER FOR SCREENING FOR CARDIOVASCULAR DISORDERS: ICD-10-CM

## 2025-02-11 DIAGNOSIS — R60.0 LOCALIZED EDEMA: ICD-10-CM

## 2025-02-11 DIAGNOSIS — I20.0 UNSTABLE ANGINA (MULTI): ICD-10-CM

## 2025-02-11 DIAGNOSIS — J43.8 OTHER EMPHYSEMA (MULTI): ICD-10-CM

## 2025-02-11 DIAGNOSIS — Z95.5 STATUS POST INSERTION OF DRUG ELUTING CORONARY ARTERY STENT: ICD-10-CM

## 2025-02-11 LAB
ABO GROUP (TYPE) IN BLOOD: NORMAL
ABO GROUP (TYPE) IN BLOOD: NORMAL
ACT BLD: 168 SEC (ref 83–199)
ACT BLD: 202 SEC (ref 83–199)
ACT BLD: 250 SEC (ref 82–174)
ACT BLD: 277 SEC (ref 82–174)
ACT BLD: 287 SEC (ref 83–199)
ACT BLD: NORMAL S
ALBUMIN SERPL BCP-MCNC: 3.4 G/DL (ref 3.4–5)
ALP SERPL-CCNC: 68 U/L (ref 33–136)
ALT SERPL W P-5'-P-CCNC: 17 U/L (ref 10–52)
ANION GAP BLDA CALCULATED.4IONS-SCNC: 12 MMO/L (ref 10–25)
ANION GAP BLDA CALCULATED.4IONS-SCNC: 12 MMO/L (ref 10–25)
ANION GAP BLDA CALCULATED.4IONS-SCNC: 14 MMO/L (ref 10–25)
ANION GAP BLDV CALCULATED.4IONS-SCNC: 10 MMOL/L (ref 10–25)
ANION GAP SERPL CALC-SCNC: 13 MMOL/L (ref 10–20)
ANTIBODY SCREEN: NORMAL
AORTIC VALVE MEAN GRADIENT: 14 MMHG
AORTIC VALVE MEAN GRADIENT: 17 MMHG
AORTIC VALVE PEAK VELOCITY: 2.6 M/S
AORTIC VALVE PEAK VELOCITY: 2.9 M/S
APTT PPP: 93 SECONDS (ref 27–38)
AST SERPL W P-5'-P-CCNC: 21 U/L (ref 9–39)
ATRIAL RATE: 101 BPM
AV PEAK GRADIENT: 27 MMHG
AV PEAK GRADIENT: 34 MMHG
AVA (PEAK VEL): 1.29 CM2
AVA (VTI): 1.45 CM2
BASE EXCESS BLDA CALC-SCNC: -5.8 MMOL/L (ref -2–3)
BASE EXCESS BLDA CALC-SCNC: -6.3 MMOL/L (ref -2–3)
BASE EXCESS BLDA CALC-SCNC: -6.3 MMOL/L (ref -2–3)
BASE EXCESS BLDV CALC-SCNC: -3.4 MMOL/L (ref -2–3)
BASOPHILS # BLD AUTO: 0.05 X10*3/UL (ref 0–0.1)
BASOPHILS # BLD AUTO: 0.09 X10*3/UL (ref 0–0.1)
BASOPHILS NFR BLD AUTO: 0.3 %
BASOPHILS NFR BLD AUTO: 0.5 %
BILIRUB SERPL-MCNC: 0.6 MG/DL (ref 0–1.2)
BLOOD EXPIRATION DATE: NORMAL
BODY TEMPERATURE: 37 DEGREES CELSIUS
BUN SERPL-MCNC: 11 MG/DL (ref 6–23)
CA-I BLDA-SCNC: 1.05 MMOL/L (ref 1.1–1.33)
CA-I BLDA-SCNC: 1.16 MMOL/L (ref 1.1–1.33)
CA-I BLDA-SCNC: 1.19 MMOL/L (ref 1.1–1.33)
CA-I BLDV-SCNC: 1.18 MMOL/L (ref 1.1–1.33)
CALCIUM SERPL-MCNC: 8.3 MG/DL (ref 8.6–10.6)
CHLORIDE BLDA-SCNC: 102 MMOL/L (ref 98–107)
CHLORIDE BLDA-SCNC: 102 MMOL/L (ref 98–107)
CHLORIDE BLDA-SCNC: 103 MMOL/L (ref 98–107)
CHLORIDE BLDV-SCNC: 101 MMOL/L (ref 98–107)
CHLORIDE SERPL-SCNC: 101 MMOL/L (ref 98–107)
CO2 SERPL-SCNC: 25 MMOL/L (ref 21–32)
CREAT SERPL-MCNC: 0.61 MG/DL (ref 0.5–1.3)
DISPENSE STATUS: NORMAL
EGFRCR SERPLBLD CKD-EPI 2021: >90 ML/MIN/1.73M*2
EJECTION FRACTION APICAL 4 CHAMBER: 54.1
EJECTION FRACTION APICAL 4 CHAMBER: 82.6
EJECTION FRACTION: 53 %
EJECTION FRACTION: 73 %
EOSINOPHIL # BLD AUTO: 0.02 X10*3/UL (ref 0–0.4)
EOSINOPHIL # BLD AUTO: 0.05 X10*3/UL (ref 0–0.4)
EOSINOPHIL NFR BLD AUTO: 0.1 %
EOSINOPHIL NFR BLD AUTO: 0.3 %
ERYTHROCYTE [DISTWIDTH] IN BLOOD BY AUTOMATED COUNT: 12.7 % (ref 11.5–14.5)
ERYTHROCYTE [DISTWIDTH] IN BLOOD BY AUTOMATED COUNT: 12.9 % (ref 11.5–14.5)
GLUCOSE BLD MANUAL STRIP-MCNC: 131 MG/DL (ref 74–99)
GLUCOSE BLDA-MCNC: 126 MG/DL (ref 74–99)
GLUCOSE BLDA-MCNC: 129 MG/DL (ref 74–99)
GLUCOSE BLDA-MCNC: 130 MG/DL (ref 74–99)
GLUCOSE BLDV-MCNC: 126 MG/DL (ref 74–99)
GLUCOSE SERPL-MCNC: 125 MG/DL (ref 74–99)
HCO3 BLDA-SCNC: 20.2 MMOL/L (ref 22–26)
HCO3 BLDA-SCNC: 20.6 MMOL/L (ref 22–26)
HCO3 BLDA-SCNC: 21.1 MMOL/L (ref 22–26)
HCO3 BLDV-SCNC: 25.2 MMOL/L (ref 22–26)
HCT VFR BLD AUTO: 30.4 % (ref 41–52)
HCT VFR BLD AUTO: 32.8 % (ref 41–52)
HCT VFR BLD EST: 30 % (ref 41–52)
HCT VFR BLD EST: 31 % (ref 41–52)
HCT VFR BLD EST: 33 % (ref 41–52)
HCT VFR BLD EST: 34 % (ref 41–52)
HGB BLD-MCNC: 10.4 G/DL (ref 13.5–17.5)
HGB BLD-MCNC: 11.6 G/DL (ref 13.5–17.5)
HGB BLDA-MCNC: 10.1 G/DL (ref 13.5–17.5)
HGB BLDA-MCNC: 10.4 G/DL (ref 13.5–17.5)
HGB BLDA-MCNC: 11.4 G/DL (ref 13.5–17.5)
HGB BLDV-MCNC: 11.1 G/DL (ref 13.5–17.5)
IMM GRANULOCYTES # BLD AUTO: 0.09 X10*3/UL (ref 0–0.5)
IMM GRANULOCYTES # BLD AUTO: 0.1 X10*3/UL (ref 0–0.5)
IMM GRANULOCYTES NFR BLD AUTO: 0.5 % (ref 0–0.9)
IMM GRANULOCYTES NFR BLD AUTO: 0.6 % (ref 0–0.9)
INHALED O2 CONCENTRATION: 21 %
INR PPP: 1.1 (ref 0.9–1.1)
LACTATE BLDA-SCNC: 2.3 MMOL/L (ref 0.4–2)
LACTATE BLDA-SCNC: 2.8 MMOL/L (ref 0.4–2)
LACTATE BLDA-SCNC: 3 MMOL/L (ref 0.4–2)
LACTATE BLDV-SCNC: 1.9 MMOL/L (ref 0.4–2)
LACTATE SERPL-SCNC: 2.4 MMOL/L (ref 0.4–2)
LACTATE SERPL-SCNC: 3.6 MMOL/L (ref 0.4–2)
LEFT ATRIUM VOLUME AREA LENGTH INDEX BSA: 21.5 ML/M2
LEFT VENTRICLE INTERNAL DIMENSION DIASTOLE: 3.61 CM (ref 3.5–6)
LEFT VENTRICULAR OUTFLOW TRACT DIAMETER: 2 CM
LYMPHOCYTES # BLD AUTO: 0.91 X10*3/UL (ref 0.8–3)
LYMPHOCYTES # BLD AUTO: 1.48 X10*3/UL (ref 0.8–3)
LYMPHOCYTES NFR BLD AUTO: 5.1 %
LYMPHOCYTES NFR BLD AUTO: 8.2 %
MCH RBC QN AUTO: 33.5 PG (ref 26–34)
MCH RBC QN AUTO: 33.8 PG (ref 26–34)
MCHC RBC AUTO-ENTMCNC: 34.2 G/DL (ref 32–36)
MCHC RBC AUTO-ENTMCNC: 35.4 G/DL (ref 32–36)
MCV RBC AUTO: 95 FL (ref 80–100)
MCV RBC AUTO: 99 FL (ref 80–100)
MITRAL VALVE E/A RATIO: 0.42
MONOCYTES # BLD AUTO: 0.8 X10*3/UL (ref 0.05–0.8)
MONOCYTES # BLD AUTO: 0.98 X10*3/UL (ref 0.05–0.8)
MONOCYTES NFR BLD AUTO: 4.4 %
MONOCYTES NFR BLD AUTO: 5.5 %
NEUTROPHILS # BLD AUTO: 15.54 X10*3/UL (ref 1.6–5.5)
NEUTROPHILS # BLD AUTO: 15.69 X10*3/UL (ref 1.6–5.5)
NEUTROPHILS NFR BLD AUTO: 86.2 %
NEUTROPHILS NFR BLD AUTO: 88.3 %
NRBC BLD-RTO: 0 /100 WBCS (ref 0–0)
NRBC BLD-RTO: 0 /100 WBCS (ref 0–0)
OXYHGB MFR BLDA: 87.7 % (ref 94–98)
OXYHGB MFR BLDA: 89.1 % (ref 94–98)
OXYHGB MFR BLDA: 95.7 % (ref 94–98)
OXYHGB MFR BLDV: 33.6 % (ref 45–75)
P AXIS: 87 DEGREES
P OFFSET: 210 MS
P ONSET: 168 MS
PCO2 BLDA: 43 MM HG (ref 38–42)
PCO2 BLDA: 46 MM HG (ref 38–42)
PCO2 BLDA: 47 MM HG (ref 38–42)
PCO2 BLDV: 63 MM HG (ref 41–51)
PH BLDA: 7.26 PH (ref 7.38–7.42)
PH BLDA: 7.26 PH (ref 7.38–7.42)
PH BLDA: 7.28 PH (ref 7.38–7.42)
PH BLDV: 7.21 PH (ref 7.33–7.43)
PLATELET # BLD AUTO: 264 X10*3/UL (ref 150–450)
PLATELET # BLD AUTO: 316 X10*3/UL (ref 150–450)
PO2 BLDA: 106 MM HG (ref 85–95)
PO2 BLDA: 59 MM HG (ref 85–95)
PO2 BLDA: 65 MM HG (ref 85–95)
PO2 BLDV: 26 MM HG (ref 35–45)
POTASSIUM BLDA-SCNC: 4.8 MMOL/L (ref 3.5–5.3)
POTASSIUM BLDA-SCNC: 5.4 MMOL/L (ref 3.5–5.3)
POTASSIUM BLDA-SCNC: 5.7 MMOL/L (ref 3.5–5.3)
POTASSIUM BLDV-SCNC: 4.9 MMOL/L (ref 3.5–5.3)
POTASSIUM SERPL-SCNC: 4.7 MMOL/L (ref 3.5–5.3)
PR INTERVAL: 126 MS
PRODUCT BLOOD TYPE: 5100
PRODUCT BLOOD TYPE: 5100
PRODUCT BLOOD TYPE: 6200
PRODUCT CODE: NORMAL
PROT SERPL-MCNC: 5.1 G/DL (ref 6.4–8.2)
PROTHROMBIN TIME: 12.4 SECONDS (ref 9.8–12.8)
Q ONSET: 231 MS
QRS COUNT: 16 BEATS
QRS DURATION: 70 MS
QT INTERVAL: 338 MS
QTC CALCULATION(BAZETT): 438 MS
QTC FREDERICIA: 402 MS
R AXIS: 74 DEGREES
RBC # BLD AUTO: 3.08 X10*6/UL (ref 4.5–5.9)
RBC # BLD AUTO: 3.46 X10*6/UL (ref 4.5–5.9)
RH FACTOR (ANTIGEN D): NORMAL
RH FACTOR (ANTIGEN D): NORMAL
RIGHT VENTRICLE PEAK SYSTOLIC PRESSURE: 19.5 MMHG
RIGHT VENTRICLE PEAK SYSTOLIC PRESSURE: 35.7 MMHG
SAO2 % BLDA: 90 % (ref 94–100)
SAO2 % BLDA: 90 % (ref 94–100)
SAO2 % BLDA: 98 % (ref 94–100)
SAO2 % BLDV: 34 % (ref 45–75)
SODIUM BLDA-SCNC: 129 MMOL/L (ref 136–145)
SODIUM BLDA-SCNC: 130 MMOL/L (ref 136–145)
SODIUM BLDA-SCNC: 132 MMOL/L (ref 136–145)
SODIUM BLDV-SCNC: 131 MMOL/L (ref 136–145)
SODIUM SERPL-SCNC: 134 MMOL/L (ref 136–145)
T AXIS: 60 DEGREES
T OFFSET: 400 MS
TRICUSPID ANNULAR PLANE SYSTOLIC EXCURSION: 1.6 CM
UNIT ABO: NORMAL
UNIT NUMBER: NORMAL
UNIT RH: NORMAL
UNIT VOLUME: 203
UNIT VOLUME: 287
UNIT VOLUME: 65
VENTRICULAR RATE: 101 BPM
WBC # BLD AUTO: 17.8 X10*3/UL (ref 4.4–11.3)
WBC # BLD AUTO: 18 X10*3/UL (ref 4.4–11.3)
XM INTEP: NORMAL

## 2025-02-11 PROCEDURE — P9016 RBC LEUKOCYTES REDUCED: HCPCS

## 2025-02-11 PROCEDURE — 2020000001 HC ICU ROOM DAILY

## 2025-02-11 PROCEDURE — B221Z2Z COMPUTERIZED TOMOGRAPHY (CT SCAN) OF MULTIPLE CORONARY ARTERIES USING INTRAVASCULAR OPTICAL COHERENCE: ICD-10-PCS | Performed by: INTERNAL MEDICINE

## 2025-02-11 PROCEDURE — 93308 TTE F-UP OR LMTD: CPT | Performed by: INTERNAL MEDICINE

## 2025-02-11 PROCEDURE — 86901 BLOOD TYPING SEROLOGIC RH(D): CPT | Performed by: INTERNAL MEDICINE

## 2025-02-11 PROCEDURE — 85347 COAGULATION TIME ACTIVATED: CPT

## 2025-02-11 PROCEDURE — C9602 PERC D-E COR STENT ATHER S: HCPCS | Mod: LC | Performed by: INTERNAL MEDICINE

## 2025-02-11 PROCEDURE — 93325 DOPPLER ECHO COLOR FLOW MAPG: CPT | Performed by: INTERNAL MEDICINE

## 2025-02-11 PROCEDURE — 92928 PRQ TCAT PLMT NTRAC ST 1 LES: CPT | Performed by: INTERNAL MEDICINE

## 2025-02-11 PROCEDURE — 2550000001 HC RX 255 CONTRASTS: Performed by: INTERNAL MEDICINE

## 2025-02-11 PROCEDURE — 2500000005 HC RX 250 GENERAL PHARMACY W/O HCPCS

## 2025-02-11 PROCEDURE — C1725 CATH, TRANSLUMIN NON-LASER: HCPCS | Performed by: INTERNAL MEDICINE

## 2025-02-11 PROCEDURE — C1753 CATH, INTRAVAS ULTRASOUND: HCPCS | Performed by: INTERNAL MEDICINE

## 2025-02-11 PROCEDURE — 82435 ASSAY OF BLOOD CHLORIDE: CPT

## 2025-02-11 PROCEDURE — 99291 CRITICAL CARE FIRST HOUR: CPT | Performed by: STUDENT IN AN ORGANIZED HEALTH CARE EDUCATION/TRAINING PROGRAM

## 2025-02-11 PROCEDURE — 93321 DOPPLER ECHO F-UP/LMTD STD: CPT | Performed by: INTERNAL MEDICINE

## 2025-02-11 PROCEDURE — 99238 HOSP IP/OBS DSCHRG MGMT 30/<: CPT | Performed by: STUDENT IN AN ORGANIZED HEALTH CARE EDUCATION/TRAINING PROGRAM

## 2025-02-11 PROCEDURE — 99291 CRITICAL CARE FIRST HOUR: CPT

## 2025-02-11 PROCEDURE — 92978 ENDOLUMINL IVUS OCT C 1ST: CPT | Performed by: INTERNAL MEDICINE

## 2025-02-11 PROCEDURE — 37799 UNLISTED PX VASCULAR SURGERY: CPT

## 2025-02-11 PROCEDURE — 1200000002 HC GENERAL ROOM WITH TELEMETRY DAILY

## 2025-02-11 PROCEDURE — 99152 MOD SED SAME PHYS/QHP 5/>YRS: CPT | Performed by: INTERNAL MEDICINE

## 2025-02-11 PROCEDURE — 83605 ASSAY OF LACTIC ACID: CPT | Performed by: INTERNAL MEDICINE

## 2025-02-11 PROCEDURE — 99223 1ST HOSP IP/OBS HIGH 75: CPT | Performed by: PHYSICIAN ASSISTANT

## 2025-02-11 PROCEDURE — 93321 DOPPLER ECHO F-UP/LMTD STD: CPT | Performed by: STUDENT IN AN ORGANIZED HEALTH CARE EDUCATION/TRAINING PROGRAM

## 2025-02-11 PROCEDURE — C1769 GUIDE WIRE: HCPCS | Performed by: INTERNAL MEDICINE

## 2025-02-11 PROCEDURE — 36415 COLL VENOUS BLD VENIPUNCTURE: CPT | Performed by: INTERNAL MEDICINE

## 2025-02-11 PROCEDURE — 4A033BC MEASUREMENT OF ARTERIAL PRESSURE, CORONARY, PERCUTANEOUS APPROACH: ICD-10-PCS | Performed by: INTERNAL MEDICINE

## 2025-02-11 PROCEDURE — 2500000002 HC RX 250 W HCPCS SELF ADMINISTERED DRUGS (ALT 637 FOR MEDICARE OP, ALT 636 FOR OP/ED): Performed by: STUDENT IN AN ORGANIZED HEALTH CARE EDUCATION/TRAINING PROGRAM

## 2025-02-11 PROCEDURE — 93571 IV DOP VEL&/PRESS C FLO 1ST: CPT | Performed by: INTERNAL MEDICINE

## 2025-02-11 PROCEDURE — 93325 DOPPLER ECHO COLOR FLOW MAPG: CPT | Performed by: STUDENT IN AN ORGANIZED HEALTH CARE EDUCATION/TRAINING PROGRAM

## 2025-02-11 PROCEDURE — 93005 ELECTROCARDIOGRAM TRACING: CPT

## 2025-02-11 PROCEDURE — 93458 L HRT ARTERY/VENTRICLE ANGIO: CPT | Performed by: INTERNAL MEDICINE

## 2025-02-11 PROCEDURE — B2111ZZ FLUOROSCOPY OF MULTIPLE CORONARY ARTERIES USING LOW OSMOLAR CONTRAST: ICD-10-PCS | Performed by: INTERNAL MEDICINE

## 2025-02-11 PROCEDURE — 2500000005 HC RX 250 GENERAL PHARMACY W/O HCPCS: Performed by: INTERNAL MEDICINE

## 2025-02-11 PROCEDURE — 99153 MOD SED SAME PHYS/QHP EA: CPT | Performed by: INTERNAL MEDICINE

## 2025-02-11 PROCEDURE — 2720000007 HC OR 272 NO HCPCS: Performed by: INTERNAL MEDICINE

## 2025-02-11 PROCEDURE — 2500000004 HC RX 250 GENERAL PHARMACY W/ HCPCS (ALT 636 FOR OP/ED): Performed by: INTERNAL MEDICINE

## 2025-02-11 PROCEDURE — 93325 DOPPLER ECHO COLOR FLOW MAPG: CPT

## 2025-02-11 PROCEDURE — 82947 ASSAY GLUCOSE BLOOD QUANT: CPT

## 2025-02-11 PROCEDURE — 2500000004 HC RX 250 GENERAL PHARMACY W/ HCPCS (ALT 636 FOR OP/ED)

## 2025-02-11 PROCEDURE — 027135Z DILATION OF CORONARY ARTERY, TWO ARTERIES WITH TWO DRUG-ELUTING INTRALUMINAL DEVICES, PERCUTANEOUS APPROACH: ICD-10-PCS | Performed by: INTERNAL MEDICINE

## 2025-02-11 PROCEDURE — P9012 CRYOPRECIPITATE EACH UNIT: HCPCS

## 2025-02-11 PROCEDURE — C1874 STENT, COATED/COV W/DEL SYS: HCPCS | Performed by: INTERNAL MEDICINE

## 2025-02-11 PROCEDURE — 85730 THROMBOPLASTIN TIME PARTIAL: CPT

## 2025-02-11 PROCEDURE — 4A023N7 MEASUREMENT OF CARDIAC SAMPLING AND PRESSURE, LEFT HEART, PERCUTANEOUS APPROACH: ICD-10-PCS | Performed by: INTERNAL MEDICINE

## 2025-02-11 PROCEDURE — 92978 ENDOLUMINL IVUS OCT C 1ST: CPT | Mod: LC | Performed by: INTERNAL MEDICINE

## 2025-02-11 PROCEDURE — 93454 CORONARY ARTERY ANGIO S&I: CPT | Performed by: INTERNAL MEDICINE

## 2025-02-11 PROCEDURE — C1894 INTRO/SHEATH, NON-LASER: HCPCS | Performed by: INTERNAL MEDICINE

## 2025-02-11 PROCEDURE — 93308 TTE F-UP OR LMTD: CPT

## 2025-02-11 PROCEDURE — 71275 CT ANGIOGRAPHY CHEST: CPT | Performed by: RADIOLOGY

## 2025-02-11 PROCEDURE — 86923 COMPATIBILITY TEST ELECTRIC: CPT

## 2025-02-11 PROCEDURE — 82435 ASSAY OF BLOOD CHLORIDE: CPT | Performed by: INTERNAL MEDICINE

## 2025-02-11 PROCEDURE — 93308 TTE F-UP OR LMTD: CPT | Performed by: STUDENT IN AN ORGANIZED HEALTH CARE EDUCATION/TRAINING PROGRAM

## 2025-02-11 PROCEDURE — 2780000003 HC OR 278 NO HCPCS: Performed by: INTERNAL MEDICINE

## 2025-02-11 PROCEDURE — 82810 BLOOD GASES O2 SAT ONLY: CPT

## 2025-02-11 PROCEDURE — 36620 INSERTION CATHETER ARTERY: CPT

## 2025-02-11 PROCEDURE — 3E043XZ INTRODUCTION OF VASOPRESSOR INTO CENTRAL VEIN, PERCUTANEOUS APPROACH: ICD-10-PCS | Performed by: INTERNAL MEDICINE

## 2025-02-11 PROCEDURE — 94640 AIRWAY INHALATION TREATMENT: CPT

## 2025-02-11 PROCEDURE — 93306 TTE W/DOPPLER COMPLETE: CPT | Performed by: INTERNAL MEDICINE

## 2025-02-11 PROCEDURE — 71275 CT ANGIOGRAPHY CHEST: CPT

## 2025-02-11 PROCEDURE — 92979 ENDOLUMINL IVUS OCT C EA: CPT | Performed by: INTERNAL MEDICINE

## 2025-02-11 PROCEDURE — C1760 CLOSURE DEV, VASC: HCPCS | Performed by: INTERNAL MEDICINE

## 2025-02-11 PROCEDURE — P9073 PLATELETS PHERESIS PATH REDU: HCPCS

## 2025-02-11 PROCEDURE — 2500000001 HC RX 250 WO HCPCS SELF ADMINISTERED DRUGS (ALT 637 FOR MEDICARE OP): Performed by: NURSE PRACTITIONER

## 2025-02-11 PROCEDURE — 99223 1ST HOSP IP/OBS HIGH 75: CPT | Performed by: NURSE PRACTITIONER

## 2025-02-11 PROCEDURE — 36430 TRANSFUSION BLD/BLD COMPNT: CPT

## 2025-02-11 PROCEDURE — 3E033XZ INTRODUCTION OF VASOPRESSOR INTO PERIPHERAL VEIN, PERCUTANEOUS APPROACH: ICD-10-PCS | Performed by: STUDENT IN AN ORGANIZED HEALTH CARE EDUCATION/TRAINING PROGRAM

## 2025-02-11 PROCEDURE — 80053 COMPREHEN METABOLIC PANEL: CPT

## 2025-02-11 PROCEDURE — 85025 COMPLETE CBC W/AUTO DIFF WBC: CPT

## 2025-02-11 PROCEDURE — 85018 HEMOGLOBIN: CPT | Performed by: STUDENT IN AN ORGANIZED HEALTH CARE EDUCATION/TRAINING PROGRAM

## 2025-02-11 PROCEDURE — 93306 TTE W/DOPPLER COMPLETE: CPT

## 2025-02-11 PROCEDURE — 76937 US GUIDE VASCULAR ACCESS: CPT | Performed by: INTERNAL MEDICINE

## 2025-02-11 PROCEDURE — C1887 CATHETER, GUIDING: HCPCS | Performed by: INTERNAL MEDICINE

## 2025-02-11 DEVICE — STENT ONYXNG30008UX ONYX 3.00X08RX
Type: IMPLANTABLE DEVICE | Site: HEART | Status: FUNCTIONAL
Brand: ONYX FRONTIER™

## 2025-02-11 DEVICE — STENT ONYXNG22530UX ONYX 2.25X30RX
Type: IMPLANTABLE DEVICE | Site: HEART | Status: FUNCTIONAL
Brand: ONYX FRONTIER™

## 2025-02-11 RX ORDER — NOREPINEPHRINE BITARTRATE 1 MG/ML
INJECTION, SOLUTION INTRAVENOUS AS NEEDED
Status: DISCONTINUED | OUTPATIENT
Start: 2025-02-11 | End: 2025-02-11 | Stop reason: HOSPADM

## 2025-02-11 RX ORDER — SODIUM CHLORIDE 9 MG/ML
125 INJECTION, SOLUTION INTRAVENOUS CONTINUOUS
Status: DISCONTINUED | OUTPATIENT
Start: 2025-02-11 | End: 2025-02-11 | Stop reason: HOSPADM

## 2025-02-11 RX ORDER — DEXTROSE 50 % IN WATER (D50W) INTRAVENOUS SYRINGE
12.5
Status: CANCELLED | OUTPATIENT
Start: 2025-02-11

## 2025-02-11 RX ORDER — FENTANYL CITRATE 50 UG/ML
INJECTION, SOLUTION INTRAMUSCULAR; INTRAVENOUS AS NEEDED
Status: DISCONTINUED | OUTPATIENT
Start: 2025-02-11 | End: 2025-02-11 | Stop reason: HOSPADM

## 2025-02-11 RX ORDER — CLOPIDOGREL BISULFATE 75 MG/1
600 TABLET ORAL ONCE
Status: COMPLETED | OUTPATIENT
Start: 2025-02-11 | End: 2025-02-11

## 2025-02-11 RX ORDER — ONDANSETRON HYDROCHLORIDE 2 MG/ML
4 INJECTION, SOLUTION INTRAVENOUS EVERY 8 HOURS PRN
Status: DISCONTINUED | OUTPATIENT
Start: 2025-02-11 | End: 2025-02-11 | Stop reason: HOSPADM

## 2025-02-11 RX ORDER — IPRATROPIUM BROMIDE AND ALBUTEROL SULFATE 2.5; .5 MG/3ML; MG/3ML
3 SOLUTION RESPIRATORY (INHALATION)
Status: DISCONTINUED | OUTPATIENT
Start: 2025-02-11 | End: 2025-02-12

## 2025-02-11 RX ORDER — ASPIRIN 81 MG/1
81 TABLET ORAL DAILY
Status: DISCONTINUED | OUTPATIENT
Start: 2025-02-11 | End: 2025-02-21 | Stop reason: HOSPADM

## 2025-02-11 RX ORDER — ACETAMINOPHEN 325 MG/1
650 TABLET ORAL EVERY 6 HOURS PRN
Status: DISCONTINUED | OUTPATIENT
Start: 2025-02-11 | End: 2025-02-11 | Stop reason: HOSPADM

## 2025-02-11 RX ORDER — ACETAMINOPHEN 650 MG/1
650 SUPPOSITORY RECTAL EVERY 6 HOURS PRN
Status: DISCONTINUED | OUTPATIENT
Start: 2025-02-11 | End: 2025-02-11 | Stop reason: HOSPADM

## 2025-02-11 RX ORDER — CLOPIDOGREL BISULFATE 75 MG/1
75 TABLET ORAL
Status: DISCONTINUED | OUTPATIENT
Start: 2025-02-12 | End: 2025-02-21 | Stop reason: HOSPADM

## 2025-02-11 RX ORDER — ACETAMINOPHEN 160 MG/5ML
650 SOLUTION ORAL EVERY 6 HOURS PRN
Status: DISCONTINUED | OUTPATIENT
Start: 2025-02-11 | End: 2025-02-11 | Stop reason: HOSPADM

## 2025-02-11 RX ORDER — SODIUM CHLORIDE, SODIUM LACTATE, POTASSIUM CHLORIDE, CALCIUM CHLORIDE 600; 310; 30; 20 MG/100ML; MG/100ML; MG/100ML; MG/100ML
125 INJECTION, SOLUTION INTRAVENOUS CONTINUOUS
Status: DISCONTINUED | OUTPATIENT
Start: 2025-02-11 | End: 2025-02-11

## 2025-02-11 RX ORDER — EZETIMIBE 10 MG/1
10 TABLET ORAL DAILY
Status: CANCELLED | OUTPATIENT
Start: 2025-02-12

## 2025-02-11 RX ORDER — DILTIAZEM HCL/D5W 125 MG/125
5-15 PLASTIC BAG, INJECTION (ML) INTRAVENOUS CONTINUOUS
Status: DISCONTINUED | OUTPATIENT
Start: 2025-02-11 | End: 2025-02-11

## 2025-02-11 RX ORDER — ACETAMINOPHEN 650 MG/1
650 SUPPOSITORY RECTAL EVERY 6 HOURS PRN
Status: DISCONTINUED | OUTPATIENT
Start: 2025-02-11 | End: 2025-02-21 | Stop reason: HOSPADM

## 2025-02-11 RX ORDER — EZETIMIBE 10 MG/1
10 TABLET ORAL DAILY
Status: DISCONTINUED | OUTPATIENT
Start: 2025-02-12 | End: 2025-02-21 | Stop reason: HOSPADM

## 2025-02-11 RX ORDER — DEXTROSE 50 % IN WATER (D50W) INTRAVENOUS SYRINGE
12.5
Status: DISCONTINUED | OUTPATIENT
Start: 2025-02-11 | End: 2025-02-21 | Stop reason: HOSPADM

## 2025-02-11 RX ORDER — NOREPINEPHRINE BITARTRATE/D5W 8 MG/250ML
0-.2 PLASTIC BAG, INJECTION (ML) INTRAVENOUS CONTINUOUS
Status: CANCELLED | OUTPATIENT
Start: 2025-02-11

## 2025-02-11 RX ORDER — LIDOCAINE HYDROCHLORIDE 10 MG/ML
INJECTION, SOLUTION EPIDURAL; INFILTRATION; INTRACAUDAL; PERINEURAL AS NEEDED
Status: DISCONTINUED | OUTPATIENT
Start: 2025-02-11 | End: 2025-02-11 | Stop reason: HOSPADM

## 2025-02-11 RX ORDER — CALCIUM GLUCONATE 20 MG/ML
2 INJECTION, SOLUTION INTRAVENOUS ONCE
Status: COMPLETED | OUTPATIENT
Start: 2025-02-11 | End: 2025-02-11

## 2025-02-11 RX ORDER — ACETAMINOPHEN 325 MG/1
650 TABLET ORAL EVERY 6 HOURS PRN
Status: DISCONTINUED | OUTPATIENT
Start: 2025-02-11 | End: 2025-02-21 | Stop reason: HOSPADM

## 2025-02-11 RX ORDER — ACETAMINOPHEN 160 MG/5ML
650 SOLUTION ORAL EVERY 6 HOURS PRN
Status: CANCELLED | OUTPATIENT
Start: 2025-02-11

## 2025-02-11 RX ORDER — ONDANSETRON HYDROCHLORIDE 2 MG/ML
4 INJECTION, SOLUTION INTRAVENOUS EVERY 8 HOURS PRN
Status: CANCELLED | OUTPATIENT
Start: 2025-02-11

## 2025-02-11 RX ORDER — NAPROXEN SODIUM 220 MG/1
81 TABLET, FILM COATED ORAL ONCE
Status: DISCONTINUED | OUTPATIENT
Start: 2025-02-11 | End: 2025-02-11

## 2025-02-11 RX ORDER — INSULIN LISPRO 100 [IU]/ML
0-5 INJECTION, SOLUTION INTRAVENOUS; SUBCUTANEOUS
Status: CANCELLED | OUTPATIENT
Start: 2025-02-11

## 2025-02-11 RX ORDER — NITROGLYCERIN 40 MG/100ML
INJECTION INTRAVENOUS AS NEEDED
Status: DISCONTINUED | OUTPATIENT
Start: 2025-02-11 | End: 2025-02-11 | Stop reason: HOSPADM

## 2025-02-11 RX ORDER — NOREPINEPHRINE BITARTRATE/D5W 8 MG/250ML
0-.2 PLASTIC BAG, INJECTION (ML) INTRAVENOUS CONTINUOUS
Status: DISCONTINUED | OUTPATIENT
Start: 2025-02-11 | End: 2025-02-11 | Stop reason: HOSPADM

## 2025-02-11 RX ORDER — SODIUM CHLORIDE 9 MG/ML
INJECTION, SOLUTION INTRAVENOUS CONTINUOUS PRN
Status: COMPLETED | OUTPATIENT
Start: 2025-02-11 | End: 2025-02-11

## 2025-02-11 RX ORDER — NICARDIPINE HYDROCHLORIDE 0.2 MG/ML
2.5-15 INJECTION INTRAVENOUS CONTINUOUS
Status: DISCONTINUED | OUTPATIENT
Start: 2025-02-11 | End: 2025-02-11

## 2025-02-11 RX ORDER — MIDAZOLAM HYDROCHLORIDE 1 MG/ML
INJECTION, SOLUTION INTRAMUSCULAR; INTRAVENOUS AS NEEDED
Status: DISCONTINUED | OUTPATIENT
Start: 2025-02-11 | End: 2025-02-11 | Stop reason: HOSPADM

## 2025-02-11 RX ORDER — NOREPINEPHRINE BITARTRATE/D5W 8 MG/250ML
PLASTIC BAG, INJECTION (ML) INTRAVENOUS CONTINUOUS PRN
Status: DISCONTINUED | OUTPATIENT
Start: 2025-02-11 | End: 2025-02-11 | Stop reason: HOSPADM

## 2025-02-11 RX ORDER — IPRATROPIUM BROMIDE AND ALBUTEROL SULFATE 2.5; .5 MG/3ML; MG/3ML
3 SOLUTION RESPIRATORY (INHALATION)
Status: DISCONTINUED | OUTPATIENT
Start: 2025-02-11 | End: 2025-02-11 | Stop reason: HOSPADM

## 2025-02-11 RX ORDER — INSULIN LISPRO 100 [IU]/ML
0-5 INJECTION, SOLUTION INTRAVENOUS; SUBCUTANEOUS EVERY 4 HOURS
Status: DISCONTINUED | OUTPATIENT
Start: 2025-02-11 | End: 2025-02-11

## 2025-02-11 RX ORDER — DEXTROSE 50 % IN WATER (D50W) INTRAVENOUS SYRINGE
12.5
Status: DISCONTINUED | OUTPATIENT
Start: 2025-02-11 | End: 2025-02-11 | Stop reason: HOSPADM

## 2025-02-11 RX ORDER — INSULIN LISPRO 100 [IU]/ML
0-5 INJECTION, SOLUTION INTRAVENOUS; SUBCUTANEOUS
Status: DISCONTINUED | OUTPATIENT
Start: 2025-02-11 | End: 2025-02-11 | Stop reason: HOSPADM

## 2025-02-11 RX ORDER — VALSARTAN 160 MG/1
160 TABLET ORAL DAILY
Status: CANCELLED | OUTPATIENT
Start: 2025-02-12

## 2025-02-11 RX ORDER — SODIUM CHLORIDE 9 MG/ML
125 INJECTION, SOLUTION INTRAVENOUS CONTINUOUS
Status: DISCONTINUED | OUTPATIENT
Start: 2025-02-11 | End: 2025-02-11

## 2025-02-11 RX ORDER — AMLODIPINE BESYLATE 10 MG/1
10 TABLET ORAL DAILY
Status: DISCONTINUED | OUTPATIENT
Start: 2025-02-12 | End: 2025-02-21 | Stop reason: HOSPADM

## 2025-02-11 RX ORDER — AMLODIPINE BESYLATE 10 MG/1
10 TABLET ORAL DAILY
Status: CANCELLED | OUTPATIENT
Start: 2025-02-12

## 2025-02-11 RX ORDER — NOREPINEPHRINE BITARTRATE/D5W 8 MG/250ML
0-.2 PLASTIC BAG, INJECTION (ML) INTRAVENOUS CONTINUOUS
Status: DISCONTINUED | OUTPATIENT
Start: 2025-02-11 | End: 2025-02-11

## 2025-02-11 RX ORDER — ROSUVASTATIN CALCIUM 40 MG/1
40 TABLET, COATED ORAL NIGHTLY
Status: DISCONTINUED | OUTPATIENT
Start: 2025-02-12 | End: 2025-02-21 | Stop reason: HOSPADM

## 2025-02-11 RX ORDER — VALSARTAN 160 MG/1
160 TABLET ORAL DAILY
Status: DISCONTINUED | OUTPATIENT
Start: 2025-02-12 | End: 2025-02-21 | Stop reason: HOSPADM

## 2025-02-11 RX ORDER — DEXTROSE 50 % IN WATER (D50W) INTRAVENOUS SYRINGE
25
Status: DISCONTINUED | OUTPATIENT
Start: 2025-02-11 | End: 2025-02-21 | Stop reason: HOSPADM

## 2025-02-11 RX ORDER — ACETAMINOPHEN 650 MG/1
650 SUPPOSITORY RECTAL EVERY 6 HOURS PRN
Status: CANCELLED | OUTPATIENT
Start: 2025-02-11

## 2025-02-11 RX ORDER — PROTAMINE SULFATE 10 MG/ML
50 INJECTION, SOLUTION INTRAVENOUS ONCE
Status: DISCONTINUED | OUTPATIENT
Start: 2025-02-11 | End: 2025-02-11

## 2025-02-11 RX ORDER — EZETIMIBE 10 MG/1
10 TABLET ORAL DAILY
Status: DISCONTINUED | OUTPATIENT
Start: 2025-02-12 | End: 2025-02-11 | Stop reason: HOSPADM

## 2025-02-11 RX ORDER — ROSUVASTATIN CALCIUM 20 MG/1
40 TABLET, COATED ORAL NIGHTLY
Status: CANCELLED | OUTPATIENT
Start: 2025-02-12

## 2025-02-11 RX ORDER — ONDANSETRON HYDROCHLORIDE 2 MG/ML
4 INJECTION, SOLUTION INTRAVENOUS EVERY 8 HOURS PRN
Status: DISCONTINUED | OUTPATIENT
Start: 2025-02-11 | End: 2025-02-21 | Stop reason: HOSPADM

## 2025-02-11 RX ORDER — AMLODIPINE BESYLATE 10 MG/1
10 TABLET ORAL DAILY
Status: DISCONTINUED | OUTPATIENT
Start: 2025-02-12 | End: 2025-02-11 | Stop reason: HOSPADM

## 2025-02-11 RX ORDER — NOREPINEPHRINE BITARTRATE/D5W 8 MG/250ML
PLASTIC BAG, INJECTION (ML) INTRAVENOUS
Status: DISPENSED
Start: 2025-02-11 | End: 2025-02-12

## 2025-02-11 RX ORDER — ACETAMINOPHEN 160 MG/5ML
650 SOLUTION ORAL EVERY 6 HOURS PRN
Status: DISCONTINUED | OUTPATIENT
Start: 2025-02-11 | End: 2025-02-21 | Stop reason: HOSPADM

## 2025-02-11 RX ORDER — AMLODIPINE BESYLATE 10 MG/1
10 TABLET ORAL DAILY
Status: DISCONTINUED | OUTPATIENT
Start: 2025-02-11 | End: 2025-02-11 | Stop reason: SDUPTHER

## 2025-02-11 RX ORDER — IPRATROPIUM BROMIDE AND ALBUTEROL SULFATE 2.5; .5 MG/3ML; MG/3ML
3 SOLUTION RESPIRATORY (INHALATION)
Status: CANCELLED | OUTPATIENT
Start: 2025-02-11

## 2025-02-11 RX ORDER — SODIUM CHLORIDE 9 MG/ML
125 INJECTION, SOLUTION INTRAVENOUS CONTINUOUS
Status: CANCELLED | OUTPATIENT
Start: 2025-02-11 | End: 2025-02-11

## 2025-02-11 RX ORDER — EZETIMIBE 10 MG/1
10 TABLET ORAL DAILY
Status: DISCONTINUED | OUTPATIENT
Start: 2025-02-11 | End: 2025-02-11

## 2025-02-11 RX ORDER — DEXTROSE 50 % IN WATER (D50W) INTRAVENOUS SYRINGE
25
Status: CANCELLED | OUTPATIENT
Start: 2025-02-11

## 2025-02-11 RX ORDER — FLUTICASONE PROPIONATE 50 MCG
1 SPRAY, SUSPENSION (ML) NASAL DAILY PRN
COMMUNITY

## 2025-02-11 RX ORDER — ACETAMINOPHEN 325 MG/1
650 TABLET ORAL EVERY 6 HOURS PRN
Status: CANCELLED | OUTPATIENT
Start: 2025-02-11

## 2025-02-11 RX ORDER — VALSARTAN 160 MG/1
160 TABLET ORAL DAILY
Status: DISCONTINUED | OUTPATIENT
Start: 2025-02-11 | End: 2025-02-11 | Stop reason: HOSPADM

## 2025-02-11 RX ORDER — PHENYLEPHRINE HCL IN 0.9% NACL 1 MG/10 ML
SYRINGE (ML) INTRAVENOUS AS NEEDED
Status: DISCONTINUED | OUTPATIENT
Start: 2025-02-11 | End: 2025-02-11 | Stop reason: HOSPADM

## 2025-02-11 RX ORDER — DEXTROSE 50 % IN WATER (D50W) INTRAVENOUS SYRINGE
25
Status: DISCONTINUED | OUTPATIENT
Start: 2025-02-11 | End: 2025-02-11 | Stop reason: HOSPADM

## 2025-02-11 RX ORDER — MULTIVIT-MIN/IRON FUM/FOLIC AC 7.5 MG-4
1 TABLET ORAL DAILY
COMMUNITY

## 2025-02-11 RX ORDER — HEPARIN SODIUM 1000 [USP'U]/ML
INJECTION, SOLUTION INTRAVENOUS; SUBCUTANEOUS AS NEEDED
Status: DISCONTINUED | OUTPATIENT
Start: 2025-02-11 | End: 2025-02-11 | Stop reason: HOSPADM

## 2025-02-11 RX ORDER — NOREPINEPHRINE BITARTRATE/D5W 8 MG/250ML
.01-1 PLASTIC BAG, INJECTION (ML) INTRAVENOUS CONTINUOUS
Status: DISCONTINUED | OUTPATIENT
Start: 2025-02-11 | End: 2025-02-13

## 2025-02-11 RX ORDER — INSULIN LISPRO 100 [IU]/ML
0-5 INJECTION, SOLUTION INTRAVENOUS; SUBCUTANEOUS
Status: DISCONTINUED | OUTPATIENT
Start: 2025-02-11 | End: 2025-02-14

## 2025-02-11 RX ORDER — ROSUVASTATIN CALCIUM 20 MG/1
40 TABLET, COATED ORAL NIGHTLY
Status: DISCONTINUED | OUTPATIENT
Start: 2025-02-12 | End: 2025-02-11 | Stop reason: HOSPADM

## 2025-02-11 RX ORDER — ROSUVASTATIN CALCIUM 40 MG/1
40 TABLET, COATED ORAL DAILY
Status: DISCONTINUED | OUTPATIENT
Start: 2025-02-11 | End: 2025-02-11

## 2025-02-11 RX ADMIN — SODIUM CHLORIDE, POTASSIUM CHLORIDE, SODIUM LACTATE AND CALCIUM CHLORIDE 1000 ML: 600; 310; 30; 20 INJECTION, SOLUTION INTRAVENOUS at 15:30

## 2025-02-11 RX ADMIN — SODIUM CHLORIDE, POTASSIUM CHLORIDE, SODIUM LACTATE AND CALCIUM CHLORIDE 500 ML: 600; 310; 30; 20 INJECTION, SOLUTION INTRAVENOUS at 20:16

## 2025-02-11 RX ADMIN — CALCIUM GLUCONATE 2 G: 20 INJECTION, SOLUTION INTRAVENOUS at 22:42

## 2025-02-11 RX ADMIN — NOREPINEPHRINE BITARTRATE 0.02 MCG/KG/MIN: 8 INJECTION, SOLUTION INTRAVENOUS at 17:45

## 2025-02-11 RX ADMIN — IPRATROPIUM BROMIDE AND ALBUTEROL SULFATE 3 ML: .5; 3 SOLUTION RESPIRATORY (INHALATION) at 20:45

## 2025-02-11 RX ADMIN — SODIUM CHLORIDE, POTASSIUM CHLORIDE, SODIUM LACTATE AND CALCIUM CHLORIDE 1000 ML: 600; 310; 30; 20 INJECTION, SOLUTION INTRAVENOUS at 18:17

## 2025-02-11 RX ADMIN — IOHEXOL 100 ML: 350 INJECTION, SOLUTION INTRAVENOUS at 12:17

## 2025-02-11 RX ADMIN — CLOPIDOGREL BISULFATE 525 MG: 300 TABLET, FILM COATED ORAL at 07:08

## 2025-02-11 SDOH — ECONOMIC STABILITY: FOOD INSECURITY: WITHIN THE PAST 12 MONTHS, THE FOOD YOU BOUGHT JUST DIDN'T LAST AND YOU DIDN'T HAVE MONEY TO GET MORE.: NEVER TRUE

## 2025-02-11 SDOH — ECONOMIC STABILITY: HOUSING INSECURITY: IN THE LAST 12 MONTHS, WAS THERE A TIME WHEN YOU WERE NOT ABLE TO PAY THE MORTGAGE OR RENT ON TIME?: NO

## 2025-02-11 SDOH — ECONOMIC STABILITY: HOUSING INSECURITY: AT ANY TIME IN THE PAST 12 MONTHS, WERE YOU HOMELESS OR LIVING IN A SHELTER (INCLUDING NOW)?: NO

## 2025-02-11 SDOH — SOCIAL STABILITY: SOCIAL INSECURITY: WITHIN THE LAST YEAR, HAVE YOU BEEN AFRAID OF YOUR PARTNER OR EX-PARTNER?: NO

## 2025-02-11 SDOH — HEALTH STABILITY: MENTAL HEALTH
DO YOU FEEL STRESS - TENSE, RESTLESS, NERVOUS, OR ANXIOUS, OR UNABLE TO SLEEP AT NIGHT BECAUSE YOUR MIND IS TROUBLED ALL THE TIME - THESE DAYS?: TO SOME EXTENT

## 2025-02-11 SDOH — SOCIAL STABILITY: SOCIAL INSECURITY
WITHIN THE LAST YEAR, HAVE YOU BEEN KICKED, HIT, SLAPPED, OR OTHERWISE PHYSICALLY HURT BY YOUR PARTNER OR EX-PARTNER?: NO

## 2025-02-11 SDOH — SOCIAL STABILITY: SOCIAL INSECURITY: DO YOU FEEL ANYONE HAS EXPLOITED OR TAKEN ADVANTAGE OF YOU FINANCIALLY OR OF YOUR PERSONAL PROPERTY?: NO

## 2025-02-11 SDOH — ECONOMIC STABILITY: FOOD INSECURITY: HOW HARD IS IT FOR YOU TO PAY FOR THE VERY BASICS LIKE FOOD, HOUSING, MEDICAL CARE, AND HEATING?: NOT HARD AT ALL

## 2025-02-11 SDOH — SOCIAL STABILITY: SOCIAL INSECURITY: HAS ANYONE EVER THREATENED TO HURT YOUR FAMILY OR YOUR PETS?: NO

## 2025-02-11 SDOH — SOCIAL STABILITY: SOCIAL INSECURITY: HAVE YOU HAD ANY THOUGHTS OF HARMING ANYONE ELSE?: NO

## 2025-02-11 SDOH — SOCIAL STABILITY: SOCIAL INSECURITY: ABUSE: ADULT

## 2025-02-11 SDOH — SOCIAL STABILITY: SOCIAL INSECURITY
ASK PARENT OR GUARDIAN: ARE THERE TIMES WHEN YOU, YOUR CHILD(REN), OR ANY MEMBER OF YOUR HOUSEHOLD FEEL UNSAFE, HARMED, OR THREATENED AROUND PERSONS WITH WHOM YOU KNOW OR LIVE?: NO

## 2025-02-11 SDOH — SOCIAL STABILITY: SOCIAL INSECURITY: DO YOU FEEL UNSAFE GOING BACK TO THE PLACE WHERE YOU ARE LIVING?: NO

## 2025-02-11 SDOH — SOCIAL STABILITY: SOCIAL INSECURITY: WITHIN THE LAST YEAR, HAVE YOU BEEN HUMILIATED OR EMOTIONALLY ABUSED IN OTHER WAYS BY YOUR PARTNER OR EX-PARTNER?: NO

## 2025-02-11 SDOH — SOCIAL STABILITY: SOCIAL INSECURITY
WITHIN THE LAST YEAR, HAVE YOU BEEN RAPED OR FORCED TO HAVE ANY KIND OF SEXUAL ACTIVITY BY YOUR PARTNER OR EX-PARTNER?: NO

## 2025-02-11 SDOH — ECONOMIC STABILITY: FOOD INSECURITY: WITHIN THE PAST 12 MONTHS, YOU WORRIED THAT YOUR FOOD WOULD RUN OUT BEFORE YOU GOT THE MONEY TO BUY MORE.: NEVER TRUE

## 2025-02-11 SDOH — ECONOMIC STABILITY: INCOME INSECURITY: IN THE PAST 12 MONTHS HAS THE ELECTRIC, GAS, OIL, OR WATER COMPANY THREATENED TO SHUT OFF SERVICES IN YOUR HOME?: NO

## 2025-02-11 SDOH — SOCIAL STABILITY: SOCIAL INSECURITY: ARE THERE ANY APPARENT SIGNS OF INJURIES/BEHAVIORS THAT COULD BE RELATED TO ABUSE/NEGLECT?: NO

## 2025-02-11 SDOH — SOCIAL STABILITY: SOCIAL INSECURITY: ARE YOU OR HAVE YOU BEEN THREATENED OR ABUSED PHYSICALLY, EMOTIONALLY, OR SEXUALLY BY ANYONE?: NO

## 2025-02-11 SDOH — SOCIAL STABILITY: SOCIAL INSECURITY: HAVE YOU HAD THOUGHTS OF HARMING ANYONE ELSE?: NO

## 2025-02-11 SDOH — ECONOMIC STABILITY: HOUSING INSECURITY: IN THE PAST 12 MONTHS, HOW MANY TIMES HAVE YOU MOVED WHERE YOU WERE LIVING?: 1

## 2025-02-11 SDOH — SOCIAL STABILITY: SOCIAL INSECURITY: WERE YOU ABLE TO COMPLETE ALL THE BEHAVIORAL HEALTH SCREENINGS?: YES

## 2025-02-11 SDOH — ECONOMIC STABILITY: HOUSING INSECURITY: DO YOU FEEL UNSAFE GOING BACK TO THE PLACE WHERE YOU LIVE?: NO

## 2025-02-11 SDOH — SOCIAL STABILITY: SOCIAL INSECURITY: DOES ANYONE TRY TO KEEP YOU FROM HAVING/CONTACTING OTHER FRIENDS OR DOING THINGS OUTSIDE YOUR HOME?: NO

## 2025-02-11 SDOH — HEALTH STABILITY: PHYSICAL HEALTH: ON AVERAGE, HOW MANY DAYS PER WEEK DO YOU ENGAGE IN MODERATE TO STRENUOUS EXERCISE (LIKE A BRISK WALK)?: 0 DAYS

## 2025-02-11 SDOH — HEALTH STABILITY: PHYSICAL HEALTH: ON AVERAGE, HOW MANY MINUTES DO YOU ENGAGE IN EXERCISE AT THIS LEVEL?: 30 MIN

## 2025-02-11 SDOH — ECONOMIC STABILITY: HOUSING INSECURITY: IN THE PAST 12 MONTHS, HOW MANY TIMES HAVE YOU MOVED WHERE YOU WERE LIVING?: 0

## 2025-02-11 SDOH — ECONOMIC STABILITY: TRANSPORTATION INSECURITY: IN THE PAST 12 MONTHS, HAS LACK OF TRANSPORTATION KEPT YOU FROM MEDICAL APPOINTMENTS OR FROM GETTING MEDICATIONS?: NO

## 2025-02-11 ASSESSMENT — COGNITIVE AND FUNCTIONAL STATUS - GENERAL
DAILY ACTIVITIY SCORE: 24
MOBILITY SCORE: 24
DAILY ACTIVITIY SCORE: 24
MOBILITY SCORE: 24
PATIENT BASELINE BEDBOUND: NO

## 2025-02-11 ASSESSMENT — ENCOUNTER SYMPTOMS
NEUROLOGICAL NEGATIVE: 1
EYES NEGATIVE: 1
PSYCHIATRIC NEGATIVE: 1
EYES NEGATIVE: 1
FATIGUE: 0
GASTROINTESTINAL NEGATIVE: 1
HEMATOLOGIC/LYMPHATIC NEGATIVE: 1
CARDIOVASCULAR NEGATIVE: 1
RESPIRATORY NEGATIVE: 1
ABDOMINAL PAIN: 0
MUSCULOSKELETAL NEGATIVE: 1
CARDIOVASCULAR NEGATIVE: 1
ALLERGIC/IMMUNOLOGIC NEGATIVE: 1
GASTROINTESTINAL NEGATIVE: 1
ABDOMINAL DISTENTION: 0
APPETITE CHANGE: 0
NEUROLOGICAL NEGATIVE: 1
ENDOCRINE NEGATIVE: 1
CONSTITUTIONAL NEGATIVE: 1
MUSCULOSKELETAL NEGATIVE: 1
CONSTITUTIONAL NEGATIVE: 1
HEMATOLOGIC/LYMPHATIC NEGATIVE: 1
RESPIRATORY NEGATIVE: 1
PSYCHIATRIC NEGATIVE: 1
ENDOCRINE NEGATIVE: 1

## 2025-02-11 ASSESSMENT — PATIENT HEALTH QUESTIONNAIRE - PHQ9
SUM OF ALL RESPONSES TO PHQ9 QUESTIONS 1 & 2: 0
SUM OF ALL RESPONSES TO PHQ9 QUESTIONS 1 & 2: 0
1. LITTLE INTEREST OR PLEASURE IN DOING THINGS: NOT AT ALL
1. LITTLE INTEREST OR PLEASURE IN DOING THINGS: NOT AT ALL
2. FEELING DOWN, DEPRESSED OR HOPELESS: NOT AT ALL
2. FEELING DOWN, DEPRESSED OR HOPELESS: NOT AT ALL

## 2025-02-11 ASSESSMENT — PAIN - FUNCTIONAL ASSESSMENT
PAIN_FUNCTIONAL_ASSESSMENT: 0-10

## 2025-02-11 ASSESSMENT — ACTIVITIES OF DAILY LIVING (ADL)
HEARING - RIGHT EAR: FUNCTIONAL
GROOMING: INDEPENDENT
WALKS IN HOME: INDEPENDENT
HEARING - RIGHT EAR: FUNCTIONAL
FEEDING YOURSELF: INDEPENDENT
DRESSING YOURSELF: INDEPENDENT
GROOMING: INDEPENDENT
PATIENT'S MEMORY ADEQUATE TO SAFELY COMPLETE DAILY ACTIVITIES?: YES
BATHING: INDEPENDENT
PATIENT'S MEMORY ADEQUATE TO SAFELY COMPLETE DAILY ACTIVITIES?: YES
ADEQUATE_TO_COMPLETE_ADL: YES
HEARING - LEFT EAR: FUNCTIONAL
TOILETING: INDEPENDENT
LACK_OF_TRANSPORTATION: NO
ADEQUATE_TO_COMPLETE_ADL: YES
WALKS IN HOME: INDEPENDENT
HEARING - LEFT EAR: FUNCTIONAL
ASSISTIVE_DEVICE: EYEGLASSES
JUDGMENT_ADEQUATE_SAFELY_COMPLETE_DAILY_ACTIVITIES: YES
LACK_OF_TRANSPORTATION: NO
BATHING: INDEPENDENT
JUDGMENT_ADEQUATE_SAFELY_COMPLETE_DAILY_ACTIVITIES: YES
DRESSING YOURSELF: INDEPENDENT
FEEDING YOURSELF: INDEPENDENT
TOILETING: INDEPENDENT
LACK_OF_TRANSPORTATION: NO

## 2025-02-11 ASSESSMENT — LIFESTYLE VARIABLES
SKIP TO QUESTIONS 9-10: 1
HOW MANY STANDARD DRINKS CONTAINING ALCOHOL DO YOU HAVE ON A TYPICAL DAY: PATIENT DOES NOT DRINK
HOW OFTEN DO YOU HAVE 6 OR MORE DRINKS ON ONE OCCASION: NEVER
HOW MANY STANDARD DRINKS CONTAINING ALCOHOL DO YOU HAVE ON A TYPICAL DAY: PATIENT DOES NOT DRINK
AUDIT-C TOTAL SCORE: 0
SKIP TO QUESTIONS 9-10: 1
HOW OFTEN DO YOU HAVE 6 OR MORE DRINKS ON ONE OCCASION: NEVER
HOW OFTEN DO YOU HAVE A DRINK CONTAINING ALCOHOL: NEVER
HOW OFTEN DO YOU HAVE A DRINK CONTAINING ALCOHOL: NEVER
AUDIT-C TOTAL SCORE: 0

## 2025-02-11 ASSESSMENT — COLUMBIA-SUICIDE SEVERITY RATING SCALE - C-SSRS
2. HAVE YOU ACTUALLY HAD ANY THOUGHTS OF KILLING YOURSELF?: NO
6. HAVE YOU EVER DONE ANYTHING, STARTED TO DO ANYTHING, OR PREPARED TO DO ANYTHING TO END YOUR LIFE?: NO
6. HAVE YOU EVER DONE ANYTHING, STARTED TO DO ANYTHING, OR PREPARED TO DO ANYTHING TO END YOUR LIFE?: NO
1. IN THE PAST MONTH, HAVE YOU WISHED YOU WERE DEAD OR WISHED YOU COULD GO TO SLEEP AND NOT WAKE UP?: NO
1. IN THE PAST MONTH, HAVE YOU WISHED YOU WERE DEAD OR WISHED YOU COULD GO TO SLEEP AND NOT WAKE UP?: NO
2. HAVE YOU ACTUALLY HAD ANY THOUGHTS OF KILLING YOURSELF?: NO

## 2025-02-11 ASSESSMENT — PAIN SCALES - GENERAL
PAINLEVEL_OUTOF10: 2
PAINLEVEL_OUTOF10: 0 - NO PAIN

## 2025-02-11 NOTE — PROGRESS NOTES
"Pharmacy Medication History Review    Mickey Dakin is a 79 y.o. male admitted for Mediastinal hematoma, initial encounter. Pharmacy reviewed the patient's [id number] medications for accuracy.    Medications ADDED:  Flonase, MVI  Medications CHANGED:  none  Medications REMOVED:   none     The list below reflects the updated PTA list.   Prior to Admission Medications   Prescriptions Last Dose Informant   aspirin 81 mg EC tablet  Self, Family Member   Sig: Take 1 tablet (81 mg) by mouth once daily.   clopidogrel (Plavix) 75 mg tablet  Self, Family Member   Sig: Take 1 tablet (75 mg) by mouth early in the morning..   ezetimibe (Zetia) 10 mg tablet  Self, Family Member   Sig: Take 1 tablet (10 mg) by mouth once daily.   felodipine ER (Plendil) 10 mg 24 hr tablet  Self, Family Member   Sig: Take 1 tablet (10 mg) by mouth early in the morning..   fluticasone (Flonase) 50 mcg/actuation nasal spray  Self, Family Member   Sig: Administer 1 spray into each nostril once daily as needed for rhinitis or allergies. Shake gently. Before first use, prime pump. After use, clean tip and replace cap.   multivitamin with minerals tablet  Self, Family Member   Sig: Take 1 tablet by mouth once daily.   olmesartan (BENIcar) 20 mg tablet  Self, Family Member   Sig: Take 1 tablet (20 mg) by mouth early in the morning..   rosuvastatin (Crestor) 40 mg tablet  Self, Family Member   Sig: Take 1 tablet (40 mg) by mouth once daily.      Facility-Administered Medications: None          Patient accepts M2B at discharge.     Sources:   [facility]  Pharmacy dispense history  Patient interview good historian, had handwritten med list  Spouse, spouse and other family member in room knew home meds well  Chart Review  Care Everywhere  Cardio OV 12/11/24    Additional Comments:  none      Riki Bolanos, PharmD  Transitions of Care Pharmacist  02/11/25     Secure Chat preferred   If no response call [phone] or [facility] \"Med Rec\"    "

## 2025-02-11 NOTE — POST-PROCEDURE NOTE
Physician Transition of Care Summary  Invasive Cardiovascular Lab    Procedure Date: 2/11/2025  Attending:    * Carl B Gillombardo - Primary  Resident/Fellow/Other Assistant: Surgeons and Role:  * No surgeons found with a matching role *    Indications:   Pre-op Diagnosis      * Unstable angina (Multi) [I20.0]    Post-procedure diagnosis:   Post-op Diagnosis     * Unstable angina (Multi) [I20.0]    Procedure(s):   PCI  20782 - NM PRQ TRLUML CORONARY STENT W/ANGIO ONE ART/BRNCH        Procedure Findings:   RRA  RFR of LM - 0.92 (Negative), for medical management  S/p OCT guided PCI to ostioproximal LCX and LM    Description of the Procedure:   7Fr guide catheter     Complications:   Retrograde LM dissection following LCx stent deployment due to a calcific lesion.    Stents/Implants:   Implants       Stent    Stent, Matt [place] Jer, 2.25 X 30rx - [id number] - Implanted        Inventory item: STENT, MATT FRONTIER JER, 2.25 X 30RX Model/Cat number: [id number]    : [employer] Lot number: [id number]    Device identifier: [id number]        GUDID Information       Request status Successful        Brand name: Matt Morris™ Version/Model: [id number]    Company name: [employer] MRI safety info as of 2/11/25: MR Conditional    Contains dry or latex rubber: No      GMDN P.T. name: Drug-eluting coronary artery stent, non-bioabsorbable-polymer-coated                As of 2/11/2025       Status: Implanted                      Stent, [place] Jer, 3.00 X 12rx - [id number] - Implanted        Inventory item: STENT, MATT FRONTIER JER, 3.00 X 12RX Model/Cat number: [id number]    : [employer] Lot number: [id number]    Device identifier: [id number]        GUDID Information       Request status Successful        Brand name: [place] Morris™ Version/Model: [id number]    Company name: [employer] MRI safety info as of 2/11/25: MR Conditional    Contains dry or latex rubber: No       GMDN P.T. name: Drug-eluting coronary artery stent, non-bioabsorbable-polymer-coated                As of 2/11/2025       Status: Implanted                      Stent, Matt [place] Jer, 3.00 X 08rx - [id number] - Wasted        Inventory item: STENT, MATT FRONTIER JER, 3.00 X 08RX Model/Cat number: [id number]    : [employer] Lot number: [id number]    Device identifier: [id number]        GUDID Information       Request status Successful        Brand name: [place]™ Version/Model: [id number]    Company name: [employer] MRI safety info as of 2/11/25: MR Conditional    Contains dry or latex rubber: No      GMDN P.T. name: Drug-eluting coronary artery stent, non-bioabsorbable-polymer-coated                As of 2/11/2025       Status: Wasted                              Anticoagulation/Antiplatelet Plan:   DAPT to continue    Estimated Blood Loss:   10 mL    Anesthesia: Moderate Sedation Anesthesia Staff: No anesthesia staff entered.    Any Specimen(s) Removed:   No specimens collected during this procedure.    Disposition:   ICU  CT scan to better visualize the dissection of the aorta       Electronically signed by: Juan Orozco MD, 2/11/2025 12:00 PM

## 2025-02-11 NOTE — H&P
History Of Present Illness  Mickey Dakin is a 79 y.o. male presenting with known severe multivessel CAD, here for FFR of LAD with possible intervention. PMH includes infrarenal AAA for over 20 years, tobacco user, COPD.     Past Medical History:  Past Medical History:   Diagnosis Date    Abdominal aortic aneurysm (AAA) ([facility])     CVA (cerebral vascular accident) (Multi) 1994    Hypertension         Past Surgical History:  Past Surgical History:   Procedure Laterality Date    CARDIAC CATHETERIZATION N/A 12/5/2024    Procedure: Left Heart Cath with Coronary Angiography and LV;  Surgeon: Carl B Gillombardo, MD;  Location: [facility] Cardiac Cath Lab;  Service: Cardiovascular;  Laterality: N/A;  [facility] THURS DEC 5TH, 2024 AT 9:00 AM PT WILL ARRIVE AT 7:30 AM AT [facility] DR. GILLOMBARDO          Social History:  Social History     Tobacco Use    Smoking status: Every Day     Current packs/day: 0.50     Average packs/day: 0.5 packs/day for 60.1 years (30.1 ttl pk-yrs)     Types: Cigarettes     Start date: 01/1965    Smokeless tobacco: Never   Substance Use Topics    Alcohol use: Never    Drug use: Never       Family History:  Family History   Problem Relation Name Age of Onset    Aortic aneurysm Neg Hx          Allergies:  No Known Allergies     Home Medications:  Current Outpatient Medications   Medication Instructions    aspirin 81 mg, Daily    clopidogrel (Plavix) 75 mg tablet 1 tablet, Daily (0630)    ezetimibe (ZETIA) 10 mg, oral, Daily    felodipine ER (Plendil) 10 mg 24 hr tablet 1 tablet, Daily (0630)    olmesartan (BENIcar) 20 mg tablet 1 tablet, Daily (0630)    rosuvastatin (CRESTOR) 40 mg, oral, Daily       Inpatient Medications:  Scheduled medications   Medication Dose Route Frequency    aspirin  81 mg oral Once     PRN medications   Medication     Continuous Medications   Medication Dose Last Rate         Review of Systems   Constitutional: Negative.    HENT: Negative.     Eyes: Negative.    Respiratory: Negative.      Cardiovascular: Negative.    Gastrointestinal: Negative.    Endocrine: Negative.    Genitourinary: Negative.    Musculoskeletal: Negative.    Skin: Negative.    Allergic/Immunologic: Negative.    Neurological: Negative.    Hematological: Negative.    Psychiatric/Behavioral: Negative.            Physical Exam  Constitutional:       General: He is awake. He is not in acute distress.     Appearance: He is not ill-appearing.   Cardiovascular:      Rate and Rhythm: Normal rate and regular rhythm.      Pulses:           Radial pulses are 2+ on the right side and 2+ on the left side.        Dorsalis pedis pulses are 1+ on the right side and 1+ on the left side.      Heart sounds: Normal heart sounds. No murmur heard.  Pulmonary:      Effort: Pulmonary effort is normal.      Breath sounds: Normal breath sounds and air entry.   Abdominal:      General: Bowel sounds are normal.      Palpations: Abdomen is soft.      Tenderness: There is no abdominal tenderness.   Musculoskeletal:      Right lower le+ Pitting Edema present.      Left lower le+ Pitting Edema present.   Skin:     General: Skin is warm and dry.   Neurological:      General: No focal deficit present.      Mental Status: He is alert and oriented to person, place, and time.      GCS: GCS eye subscore is 4. GCS verbal subscore is 5. GCS motor subscore is 6.   Psychiatric:         Mood and Affect: Mood normal.         Behavior: Behavior is cooperative.        Sedation Plan    ASA 3     Mallampati class: II.           NPO since last night around 1900    Last Recorded Vitals  Blood pressure 122/69, pulse 85, resp. rate 20, SpO2 95%.         Vitals from the Past 24 Hours  Heart Rate:  [85]   Resp:  [20]   BP: (122)/(69)   SpO2:  [95 %]          Relevant Results    Labs      CBC:   Recent Labs     25  1207   WBC 9.2   HGB 14.8   HCT 44.3      .5*     BMP/CMP:   Recent Labs     25  1207 25  0810 24  0922    136 135*   K  "4.9 4.9 4.8   CL 98 101 99   BUN 8 8 9   CREATININE 0.53* 0.64* 0.55   CO2 28 29 31   CALCIUM 9.1 9.3 9.6   GLUCOSE 87 96 98      Magnesium: No results for input(s): \"MG\" in the last 09832 hours.  Lipid Panel: No results for input(s): \"CHOL\", \"HDL\", \"CHHDL\", \"LDL\", \"VLDL\", \"TRIG\", \"NHDL\" in the last 65274 hours.  Cardiac       No lab exists for component: \"CK\", \"CKMBP\"   Hemoglobin A1C: No results for input(s): \"HGBA1C\" in the last 60833 hours.  TSH/ Free T4: No results for input(s): \"TSH\", \"FREET4\" in the last 95064 hours.  Iron: No results for input(s): \"FERRITIN\", \"TIBC\", \"IRONSAT\", \"BNP\" in the last 29647 hours.  Coag:     ABO: No results found for: \"ABO\"    Past Cardiology Tests (Last 3 Years):    EKG:  No results for input(s): \"ATRRATE\", \"VENTRATE\", \"PRINT\", \"QRSDUR\", \"QTCFRED\", \"QTCCALCB\" in the last 64329 hours.No results found for this or any previous visit (from the past 4464 hours).  Echo:  Echocardiogram:   Transthoracic Echo (TTE) Complete 01/16/2025    PHYSICIAN INTERPRETATION:  Left Ventricle: The left ventricular systolic function is normal, with a calculated ejection fraction of 62% by auto EF. There are no regional wall motion abnormalities. The left ventricular cavity size is normal. There is normal septal and normal posterior left ventricular wall thickness. Spectral Doppler shows a normal pattern of left ventricular diastolic filling.  Left Atrium: The left atrium is normal in size.  Right Ventricle: The right ventricle is normal in size. There is normal right ventricular global systolic function.  Right Atrium: The right atrium is normal in size.  Aortic Valve: The aortic valve is trileaflet. There is moderate aortic valve cusp calcification. There is moderate aortic valve thickening. There is evidence of moderate aortic valve stenosis.  The aortic valve dimensionless index is 0.44. There is moderate aortic valve regurgitation. The peak instantaneous gradient of the aortic valve is 50 mmHg. The " mean gradient of the aortic valve is 27 mmHg.  Mitral Valve: The mitral valve is normal in structure. There is mild mitral annular calcification. There is no evidence of mitral valve regurgitation.  Tricuspid Valve: The tricuspid valve is structurally normal. There is mild to moderate tricuspid regurgitation. The Doppler estimated RVSP is mildly elevated right ventricular systolic pressure at 40.2 mmHg. Small mobile echodensity seen attached to the tricuspid valve. Recommend additional imaging such as YI for further evaluation.  Pulmonic Valve: The pulmonic valve is structurally normal. There is trace pulmonic valve regurgitation.  Pericardium: No pericardial effusion noted.  Aorta: The aortic root is normal.      CONCLUSIONS:  1. The left ventricular systolic function is normal, with a calculated ejection fraction of 62% by auto EF.  2. There is normal right ventricular global systolic function.  3. Mild to moderate tricuspid regurgitation.  4. Mildly elevated right ventricular systolic pressure.  5. Small mobile echodensity seen attached to the tricuspid valve. Recommend additional imaging such as YI for further evaluation.  6. Moderate aortic valve stenosis.  7. There is moderate aortic valve cusp calcification.  8. Moderate aortic valve regurgitation.      Ejection Fractions:  LV EF   Date/Time Value Ref Range Status   01/16/2025 02:15 PM 62 %      Cath:  Coronary Angiography:   Left Heart Cath with Coronary Angiography and LV 12/05/2024    Study: Coronary Arteriogram      Indications:  MICKEY DAKIN is a 79 year old male who presents with dyslipidemia, coronary artery disease, hypertension, peripheral artery disease and an anginal equivalent chest pain assessment (i.e. dyspnea on exertion believed to be from ischemia). Pre-operative evaluation and new onset angina <=2 months.    Procedure Description:  After infiltration with 1% Lidocaine, the right radial artery was cannulated with a modified Seldinger  technique. Subsequently a 6 Portuguese sheath was placed retrograde in the right radial artery. Selective coronary catheterization was performed using a 5 Fr and 4 Fr catheter(s) exchanged over a guide wire to cannulate the coronary arteries. A JL 3.5 tip catheter was used for left coronary injections. A JR 4 tip catheter was used for right coronary injections.  Multiple injections of contrast were made into the left and right coronary arteries with angiograms recorded in multiple projections.    Coronary Angiography:  The coronary circulation is right dominant.    Left Main Coronary Artery:  The left main coronary artery is a normal caliber vessel. The left main arises normally from the left coronary sinus of Valsalva and bifurcates into the LAD and circumflex coronary arteries. The left main coronary artery showed trivial atherosclerotic disease. The entire left main coronary artery showed <10% stenosis.    Left Anterior Descending Coronary Artery Distribution:  The left anterior descending coronary artery is a normal caliber vessel. The LAD arises normally from the left main coronary artery. The LAD demonstrated severe atherosclerotic disease. The mid left anterior descending coronary artery showed 70% stenosis. The 1st diagonal branch showed no significant disease or stenosis greater than 30%.    Circumflex Coronary Artery Distribution:  The circumflex coronary artery is a normal caliber vessel. The circumflex arises normally from the left main coronary artery and terminates in the AV groove. The circumflex revealed severe atherosclerotic disease. The ostial circumflex coronary artery showed 50% stenosis. An additional lesion, located at the proximal circumflex coronary artery, demonstrated 90% stenosis. The 1st obtuse marginal branch showed no significant disease or stenosis greater than 30%. The 2nd obtuse marginal branch demonstrated no significant disease or stenosis greater than 30%.    Ramus Intermedius:  The  ramus intermedius arises normally from the left main coronary artery. The ramus intermedius showed no significant disease or stenosis greater than 30%.    Right Coronary Artery Distribution:    The right coronary artery is a normal caliber vessel. The RCA arises normally from the right sinus of Valsalva. The RCA showed severe atherosclerotic disease and moderate tortuosity. The mid right coronary artery showed 90% stenosis. The acute marginal branch showed no significant disease or stenosis greater than 30%.  The proximal right posterolateral branch showed 80% stenosis. The right posterior descending artery showed no significant disease or stenosis greater than 30%.    Coronary Lesion Summary:  Vessel       Stenosis    Vessel Segment  Left Main  <10% stenosis     entire  LAD        70% stenosis       mid  Circumflex 50% stenosis      ostial  Circumflex 90% stenosis     proximal  RCA        90% stenosis       mid  RPL        80% stenosis     proximal      Complications:  No in-lab complications observed.    Cardiac Cath Post Procedure Notes:  Post Procedure Diagnosis: Right dominant coronary circulation  severe multivessel CAD.  Blood Loss:               Estimated blood loss during the procedure was 10 mls.  Specimens Removed:        Number of specimen(s) removed: none.      Recommendations:  Maximize medical therapy.  Agressive risk factor modification efforts.  CT surgical consultation to consider possible surgical options.    ____________________________________________________________________________________  CONCLUSIONS:  1. Right dominant coronary circulation with severe multivessel CAD, as described above.  2. Recommend CT surgery consultation for CABG evaluation.      Right Heart Cath: No results found for this or any previous visit from the past 1800 days.    Stress Test:  Nuclear:No results found for this or any previous visit from the past 1800 days.    Metabolic Stress: No results found for this or any  previous visit from the past 1800 days.    Cardiac Imaging:  Cardiac Scoring: No results found for this or any previous visit from the past 1800 days.    Cardiac MRI: No results found for this or any previous visit from the past 1800 days.         Assessment/Plan  Assessment/Plan   Assessment & Plan  Unstable angina (Multi)      Known severe multivessel CAD  -planned for FFR of LAD with possible intervention with Dr. Gillombardo on 2/11/25  -asa prior to procedure  -load with plavix total 600mg prior to procedure       NP discussed with Dr. Gillombardo regarding plan of care/ discharge plan      I spent 30 minutes in the professional and overall care of this patient.      Emmanuel Kumar, APRN-CNP, DNP

## 2025-02-11 NOTE — PROCEDURES
Insert arterial line    Date/Time: 2/11/2025 6:07 PM    Performed by: Chip Hernandez MD  Authorized by: Sathish Stanley MD PhD    Consent:     Consent obtained:  Written    Consent given by:  Patient    Risks, benefits, and alternatives were discussed: yes      Risks discussed:  Bleeding, ischemia, infection and pain  Universal protocol:     Procedure explained and questions answered to patient or proxy's satisfaction: yes      Relevant documents present and verified: yes      Test results available: yes    Indications:     Indications: hemodynamic monitoring    Pre-procedure details:     Skin preparation:  Chlorhexidine  Sedation:     Sedation type:  None  Anesthesia:     Anesthesia method:  Local infiltration  Procedure details:     Location:  L radial    Needle gauge:  20 G  Post-procedure details:     Post-procedure:  Sterile dressing applied    Procedure completion:  Tolerated

## 2025-02-11 NOTE — Clinical Note
Inflation 1: Pressure = 18 benita; Duration = 30 sec. Inflation 2: Pressure = 18 benita; Duration = 10 sec.

## 2025-02-11 NOTE — CONSULTS
Reason for Consult: mediastinum hematoma s/p PCI    CARDIAC SURGERY CONSULT NOTE    HISTORY OF PRESENT ILLNESS  Mickey Dakin is a 79 y.o. male with a history of infrarenal AAA, severe mvCAD, moderate AS and AI, severe COPD (FEV1 0.59 24%). The patient was recently discussed at the [facility] meeting for his coronary artery disease and decision was made for complex PCI of LAD, LCx, and RCA given heavily calcified ascending aorta precluding CABG and high-risk comorbidities precluding MIDCAB.     On 2/11/25 the patient underwent an elective complex PCI. During complex PCI procedure, stent was deployed and team immediately noted hypotension down to 50s SBP requiring levophed, T wave inversion, and blush near distal LM on cath imaging. Pt c/o chest pain. Suspicion for dissection causing by Lcx stent deployment abutting calcific lesion and causing retrograde LM dissection. Cath team was able to deploy stent to cover the lesion and all sxs resolved.     Immediately upon arrival to ICU, patient continued to complain of 3-4 midsternal chest pain and was exhibiting hypotension to 70-80s SBP. Fluids started. Bedside and formal echos performed which demonstrated no evidence of tamponade per verbal report. CT with residual contrast tracking along aortic root/sinus, small pericardial effusion.     [place] [facility] [place]  Cardiac surgery consulted for surgical evaluation.      Subjective   Past Medical History:   Diagnosis Date    Abdominal aortic aneurysm (AAA) (CMS-HCC)     CVA (cerebral vascular accident) (Multi) 1994    Hypertension      Past Surgical History:   Procedure Laterality Date    CARDIAC CATHETERIZATION N/A 12/5/2024    Procedure: Left Heart Cath with Coronary Angiography and LV;  Surgeon: Carl B Gillombardo, MD;  Location: [facility] Cardiac Cath Lab;  Service: Cardiovascular;  Laterality: N/A;  [facility] THURS DEC 5TH, 2024 AT 9:00 AM PT WILL ARRIVE AT 7:30 AM AT [facility] DR. GILLOMBARDO     Social History  "    Tobacco Use    Smoking status: Every Day     Current packs/day: 0.50     Average packs/day: 0.5 packs/day for 60.1 years (30.1 ttl pk-yrs)     Types: Cigarettes     Start date: 01/1965    Smokeless tobacco: Never   Substance Use Topics    Alcohol use: Never    Drug use: Never     Family History   Problem Relation Name Age of Onset    Aortic aneurysm Neg Hx         Patient has no known allergies.    Prior to Admission medications    Medication Sig Start Date End Date Taking? Authorizing Provider   aspirin 81 mg EC tablet Take 1 tablet (81 mg) by mouth once daily.    Historical Provider, MD   clopidogrel (Plavix) 75 mg tablet Take 1 tablet (75 mg) by mouth early in the morning.. 10/8/24   Historical Provider, MD   ezetimibe (Zetia) 10 mg tablet Take 1 tablet (10 mg) by mouth once daily. 12/5/24   KAI Fuller, EMERY   felodipine ER (Plendil) 10 mg 24 hr tablet Take 1 tablet (10 mg) by mouth early in the morning.. 8/27/24   Historical Provider, MD   olmesartan (BENIcar) 20 mg tablet Take 1 tablet (20 mg) by mouth early in the morning.. 10/16/24   Historical Provider, MD   rosuvastatin (Crestor) 40 mg tablet Take 1 tablet (40 mg) by mouth once daily. 12/5/24   KAI Fuller, EMERY       Review of Systems  Review of Systems   Constitutional: Negative.    HENT: Negative.     Eyes: Negative.    Respiratory: Negative.     Cardiovascular: Negative.    Gastrointestinal: Negative.    Endocrine: Negative.    Genitourinary: Negative.    Musculoskeletal: Negative.    Neurological: Negative.            Objective   /89 (BP Location: Left arm, Patient Position: Lying)   Pulse 110   Resp 19   Ht 1.676 m (5' 6\")   SpO2 95%   BMI 19.85 kg/m²   0-10 (Numeric) Pain Score: 2   There were no vitals filed for this visit.     No intake or output data in the 24 hours ending 02/11/25 1550    Physical Exam  Physical Exam  Constitutional:       Appearance: He is ill-appearing.      Comments: Frail " elderly male laying in bed   HENT:      Head: Normocephalic.      Mouth/Throat:      Mouth: Mucous membranes are moist.   Cardiovascular:      Rate and Rhythm: Regular rhythm. Tachycardia present.   Pulmonary:      Comments: Tachypneic   Musculoskeletal:         General: Normal range of motion.      Cervical back: Neck supple.   Skin:     General: Skin is warm and dry.   Neurological:      General: No focal deficit present.      Mental Status: He is alert and oriented to person, place, and time.   Psychiatric:         Mood and Affect: Mood normal.         Behavior: Behavior normal.         Medications  Scheduled medications  [Held by provider] amLODIPine, 10 mg, oral, Daily  aspirin, 81 mg, oral, Daily  [START ON 2/12/2025] clopidogrel, 75 mg, oral, Daily  [START ON 2/12/2025] ezetimibe, 10 mg, oral, Daily  insulin lispro, 0-5 Units, subcutaneous, TID AC  ipratropium-albuteroL, 3 mL, nebulization, q6h  lactated Ringer's, 1,000 mL, intravenous, Once  perflutren lipid microspheres, 0.5-10 mL of dilution, intravenous, Once in imaging  perflutren protein A microsphere, 0.5 mL, intravenous, Once in imaging  [START ON 2/12/2025] rosuvastatin, 40 mg, oral, Nightly  sulfur hexafluoride microsphr, 2 mL, intravenous, Once in imaging  [Held by provider] valsartan, 160 mg, oral, Daily    Continuous medications   PRN medications  PRN medications: acetaminophen **OR** acetaminophen **OR** acetaminophen, dextrose, dextrose, glucagon, glucagon, ondansetron, oxygen    Labs  Results for orders placed or performed during the hospital encounter of 02/11/25 (from the past 24 hours)   CBC and Auto Differential   Result Value Ref Range    WBC 18.0 (H) 4.4 - 11.3 x10*3/uL    nRBC 0.0 0.0 - 0.0 /100 WBCs    RBC 3.46 (L) 4.50 - 5.90 x10*6/uL    Hemoglobin 11.6 (L) 13.5 - 17.5 g/dL    Hematocrit 32.8 (L) 41.0 - 52.0 %    MCV 95 80 - 100 fL    MCH 33.5 26.0 - 34.0 pg    MCHC 35.4 32.0 - 36.0 g/dL    RDW 12.7 11.5 - 14.5 %    Platelets 316 150  - 450 x10*3/uL    Neutrophils % 86.2 40.0 - 80.0 %    Immature Granulocytes %, Automated 0.6 0.0 - 0.9 %    Lymphocytes % 8.2 13.0 - 44.0 %    Monocytes % 4.4 2.0 - 10.0 %    Eosinophils % 0.1 0.0 - 6.0 %    Basophils % 0.5 0.0 - 2.0 %    Neutrophils Absolute 15.54 (H) 1.60 - 5.50 x10*3/uL    Immature Granulocytes Absolute, Automated 0.10 0.00 - 0.50 x10*3/uL    Lymphocytes Absolute 1.48 0.80 - 3.00 x10*3/uL    Monocytes Absolute 0.80 0.05 - 0.80 x10*3/uL    Eosinophils Absolute 0.02 0.00 - 0.40 x10*3/uL    Basophils Absolute 0.09 0.00 - 0.10 x10*3/uL               ASSESSMENT & PLAN  Mickey Dakin is a 79 y.o. male with a history of remote CVA, infrarenal AAA, severe mvCAD, moderate AS and AI, severe COPD (FEV1 0.59 24%). The patient was recently discussed at the [facility] meeting for his coronary artery disease and decision was made for complex PCI of LAD, LCx, and RCA given heavily calcified ascending aorta precluding CABG and high-risk comorbidities precluding MIDCAB.     On 2/11/25 the patient underwent an elective complex PCI. During complex PCI procedure, stent was deployed and team immediately noted hypotension down to 50s SBP requiring levophed, T wave inversion, and blush near distal LM on cath imaging. Pt c/o chest pain. Suspicion for dissection causing by Lcx stent deployment abutting calcific lesion and causing retrograde LM dissection. Cath team was able to deploy stent to cover the lesion and all sxs resolved.     Immediately upon arrival to ICU, patient continued to complain of 3-4 midsternal chest pain and was exhibiting hypotension to 70-80s SBP. Fluids started. Bedside and formal echos performed which demonstrated no evidence of tamponade per verbal report. CT with residual contrast tracking along aortic root/sinus, small pericardial effusion.     Patient [place] [facility] [place] of care.  Cardiac surgery consulted for surgical evaluation.    RECOMMENDATIONS/PLAN  Dr. Steiner well aware of  patient, has seen him as outpatient, and reviewed current imaging and data.   Overall, the patient is a very poor surgical candidate given his numerous medical/physicial co-morbidities including severe COPD (FEV1 24% predicted), 5.3cm AAA, severely calcified ascending aorta and overall frailty.    - Given the patient is hemodynamically stable and limited TTE findings of stable small pericardial effusion and no signs of tamponade, no surgery indicated at this time per Dr. Steiner.   - Continue medical management for now.   - Recommend repeat CTA in 24 hours.   - Will follow up on formal TTE.       Will continue to follow along.  Thank you for the consultation.   Patient educated and all questions answered.  Please page the cardiac surgery consult pager [phone] with any questions or changes in patient condition.    Katherin Higginbotham PA-C  Cardiac Surgery Consult [facility]  [facility]  Cardiac Surgery Consult Pager [phone]     2/11/2025  3:50 PM

## 2025-02-11 NOTE — Clinical Note
Inflation 1: Pressure = 12 benita; Duration = 25 sec. Inflation 2: Pressure = 12 benita; Duration = 15 sec.

## 2025-02-11 NOTE — DISCHARGE SUMMARY
Discharge Diagnosis  Unstable angina (Multi)    Issues Requiring Follow-Up  Severe MVD s/p complex PCI complicated by LM dissection with stent deployment  Severe COPD  Infrarenal AAA pending surgery    Test Results Pending At Discharge  Pending Labs       No current pending labs.            Hospital Course   Mickey Dakin is a 79 y.o. male presenting for elective complex PCI procedure for MVD 2/11/25. PMHx infrarenal AAA, severe MVD, moderate aortic stenosis and regurgitation, severe COPD. Notably, pt was discussed at [facility] meeting and recommended for PCI of LAD, LCx, and RCA given heavily calcified ascending aorta precluding clamping for CABG and high risk comorbidities precluding MIDCAB.     Pt presented for procedure today. During complex PCI procedure, stent was deployed and team immediately noted hypotension down to 50s SBP requiring levophed, T wave inversion, and blush near distal LM on cath imaging. Pt c/o chest pain. Suspicion for dissection causing by Lcx stent deployment abutting calcific lesion and causing retrograde LM dissection. Cath team was able to deploy stent to cover the lesion and all sxs resolved. Pt admitted to [facility] ICU for further critical care monitoring after complex PCI with LM dissection now s/p stenting.     Immediately upon arrival to ICU, patient continued to complain of 3-4 midsternal chest pain and was exhibiting hypotension to 70-80s SBP. Fluids started. Radiologist notified team of continued leak via secure chat. [facility] notified immediately and at bedside. Bedside and formal echos performed which demonstrated no evidence of tamponade per verbal report. Patient, family consented verbally for potential arterial line and central line placements. Flight critical care transport requested per IVL cardiology and pending. ACT of 202.    Held off on arterial lines due to ability to get second PIV and appropriate response to fluid resuscitation upon arrival of  flight crew.    Pertinent Physical Exam At Time of Discharge  Physical Exam  Constitutional:       Comments: Cachetic elderly male laying in bed, family (son and daughter in law, daughter and wife) at bedside being updated by Dr. Gillombardo   Cardiovascular:      Rate and Rhythm: Regular rhythm. Tachycardia present.      Comments: Occasional PVC  Pulmonary:      Effort: No respiratory distress.      Breath sounds: No stridor. No wheezing.      Comments: Clear bilaterally but diminished, barrel chest  Abdominal:      General: There is no distension.      Palpations: Abdomen is soft.      Tenderness: There is no abdominal tenderness.   Musculoskeletal:      Comments: Rt wrist occluded with TR  band. BP cuff present on Rt forearm for manual pressure of hematoma   Skin:     General: Skin is warm.      Coloration: Skin is pale.   Neurological:      General: No focal deficit present.      Mental Status: He is alert and oriented to person, place, and time.   Psychiatric:         Behavior: Behavior normal.      Comments: Anxious     Home Medications     Medication List      ASK your doctor about these medications     aspirin 81 mg EC tablet   clopidogrel 75 mg tablet; Commonly known as: Plavix   ezetimibe 10 mg tablet; Commonly known as: Zetia; Take 1 tablet (10 mg)   by mouth once daily.   felodipine ER 10 mg 24 hr tablet; Commonly known as: Plendil   olmesartan 20 mg tablet; Commonly known as: BENIcar   rosuvastatin 40 mg tablet; Commonly known as: Crestor; Take 1 tablet (40   mg) by mouth once daily.       Outpatient Follow-Up  Future Appointments   Date Time Provider Department Center   2/28/2025 11:00 AM Carl B Gillombardo, MD [id number] Jonathon Og PA-C

## 2025-02-11 NOTE — Clinical Note
Inflation 1: Pressure = 16 benita; Duration = 10 sec. Inflation 2: Pressure = 18 benita; Duration = 10 sec. Inflation 3: Pressure = 18 benita; Duration = 10 sec.

## 2025-02-11 NOTE — H&P
History Of Present Illness  Mickey Dakin is a 79 y.o. male presenting with mediastinal hematoma.    79 y.o. male presented today to [facility] for elective complex PCI procedure for MVD 2/11/25.   PMHx infrarenal AAA, severe MVD, moderate aortic stenosis and regurgitation, severe COPD.   Notably, pt was discussed at [facility] meeting and recommended for PCI of LAD, LCx, and RCA given heavily calcified ascending aorta precluding clamping for CABG and high risk comorbidities precluding CABG.     . During complex PCI procedure, stent was deployed and team immediately noted hypotension down to 50s SBP requiring levophed, T wave inversion, and blush near distal LM on cath imaging.   Pt c/o chest pain. Suspicion for dissection causing by Lcx stent deployment abutting calcific lesion and causing retrograde LM dissection. Cath team was able to deploy stent to cover the lesion and all sxs resolved.   Immediately upon arrival to ICU, patient continued to complain of 3-4 midsternal chest pain and was exhibiting hypotension to 70-80s SBP. Fluids started. Bedside and formal echos performed which demonstrated no evidence of tamponade per verbal report.   Patient transferred to [facility] for continuity of care.    In CICU, patient stable, awake, oriented, anxious. No chest pain, mild shortness of breath.  Vitals:    02/11/25 1513   BP: 149/89   Pulse: 110   Resp: 19   SpO2: 95%      Echocardio bedside done:trivial effusion. No evidence of tamponade.     Past Medical History  He has a past medical history of Abdominal aortic aneurysm (AAA) (CMS-[facility]), CVA (cerebral vascular accident) (Multi) (1994), and Hypertension.    Surgical History  He has a past surgical history that includes Cardiac catheterization (N/A, 12/5/2024).     Social History  He reports that he has been smoking cigarettes. He started smoking about 60 years ago. He has a 30.1 pack-year smoking history. He has never used smokeless tobacco. He reports that he does not drink alcohol  and does not use drugs.    Family History  Family History   Problem Relation Name Age of Onset    Aortic aneurysm Neg Hx          Allergies  Patient has no known allergies.    Review of Systems   Constitutional: Negative.  Negative for appetite change and fatigue.   HENT: Negative.     Eyes: Negative.    Respiratory: Negative.     Cardiovascular:  Positive for chest pain.   Gastrointestinal: Negative.  Negative for abdominal distention and abdominal pain.   Endocrine: Negative.    Genitourinary: Negative.    Musculoskeletal: Negative.    Skin: Negative.    Neurological: Negative.    Hematological: Negative.    Psychiatric/Behavioral: Negative.          Physical Exam  Constitutional:       General: He is not in acute distress.     Appearance: Normal appearance. He is not ill-appearing.   HENT:      Head: Normocephalic and atraumatic.   Eyes:      General: No scleral icterus.     Extraocular Movements: Extraocular movements intact.      Conjunctiva/sclera: Conjunctivae normal.      Pupils: Pupils are equal, round, and reactive to light.   Cardiovascular:      Rate and Rhythm: Normal rate and regular rhythm.      Heart sounds: Normal heart sounds. No murmur heard.  Pulmonary:      Effort: Pulmonary effort is normal. No respiratory distress.      Breath sounds: Normal breath sounds. No stridor. No wheezing, rhonchi or rales.   Abdominal:      General: Abdomen is flat. Bowel sounds are normal. There is no distension.      Palpations: Abdomen is soft.      Tenderness: There is no abdominal tenderness. There is no guarding or rebound.   Musculoskeletal:      Right lower leg: No edema.      Left lower leg: No edema.   Skin:     Coloration: Skin is not jaundiced or pale.      Findings: No rash.   Neurological:      Mental Status: He is alert and oriented to person, place, and time. Mental status is at baseline.   Psychiatric:         Mood and Affect: Mood normal.         Behavior: Behavior normal.         Thought Content:  Thought content normal.         Judgment: Judgment normal.          Last Recorded Vitals  /89 (BP Location: Left arm, Patient Position: Lying)   Pulse 110   Resp 19   SpO2 95%     Relevant Results  Results for orders placed or performed during the hospital encounter of 02/11/25 (from the past 24 hours)   ACTIVATED CLOTTING TIME HIGH   Result Value Ref Range    POCT Activated Clotting Time High Range 250 (H) 82 - 174 sec   ACTIVATED CLOTTING TIME HIGH   Result Value Ref Range    POCT Activated Clotting Time High Range 277 (H) 82 - 174 sec   ACTIVATED CLOTTING TIME LOW   Result Value Ref Range    POCT Activated Clotting Time Low Range     ACTIVATED CLOTTING TIME LOW   Result Value Ref Range    POCT Activated Clotting Time Low Range     ACTIVATED CLOTTING TIME LOW   Result Value Ref Range    POCT Activated Clotting Time Low Range     ACTIVATED CLOTTING TIME LOW   Result Value Ref Range    POCT Activated Clotting Time Low Range     ACTIVATED CLOTTING TIME LOW   Result Value Ref Range    POCT Activated Clotting Time Low Range     ACTIVATED CLOTTING TIME LOW   Result Value Ref Range    POCT Activated Clotting Time Low Range     ACTIVATED CLOTTING TIME LOW   Result Value Ref Range    POCT Activated Clotting Time Low Range 287 (H) 83 - 199 sec   ACTIVATED CLOTTING TIME LOW   Result Value Ref Range    POCT Activated Clotting Time Low Range 202 (H) 83 - 199 sec   Transthoracic Echo (TTE) Complete   Result Value Ref Range    AV pk nanci 2.90 m/s    AV mn grad 17 mmHg    LVOT diam 2.00 cm    MV E/A ratio 0.42     LA vol index A/L 21.5 ml/m2    Tricuspid annular plane systolic excursion 1.6 cm    LV EF 53 %    LVIDd 3.61 cm    RVSP 19.5 mmHg    Aortic Valve Area by Continuity of VTI 1.45 cm2    Aortic Valve Area by Continuity of Peak Velocity 1.29 cm2    AV pk grad 34 mmHg    LV A4C EF 54.1       Ct angio chest:   1. There is acute mediastinal hemorrhage with intramural hematoma of  the ascending thoracic aorta.  2.  Findings suggest acute intramural hematoma at the proximal left  main coronary artery stent. There is a exit site/contrast  extravasation into the mediastinum at the level of the posterior  commissure of the aortic valve.  3. Diffuse mediastinal hemorrhage surrounds the esophagus and a  component of esophageal intramural hematoma cannot be excluded.  Mediastinal hemorrhage with left atrial compression.  4. Is hemorrhagic pericardial fluid.  5. There is severe emphysematous changes with right middle lobe  Opacities and correlate with a component of developing  Infiltrate/aspiration.      TTE (at 12:30 pm)  1. The left ventricle was not well visualized. The left ventricular ejection fraction could not be measured.   2. There is normal right ventricular global systolic function.   3. Trivial pericardial effusion.   4. Compared with study dated 1/16/2025, the pericardial effusion is now trivial (only seen in systole) compared to none on prior study.    Assessment/Plan   79 y old male patient, CAD,COPD, infrarenal AAA, transferred from [facility] after a complex cath done. Lcx stent inserted, then complicated by retrograde leak and mediastinal bleed.  Echocardiography showed trivial pericardial effusion.    Admitted for observation. High risk for CABG.  In case of tamponade, he will require pericardial window .  Dr Steiner on board. Saw the patient upon arrival.    NEURO:   # Hx remote CVA  - Serial neuro and pain assessments  - Analgesia: PRN Tylenol     CV:   # Severe MVD s/p complex PCI to LAD, Lcx, RCA with LM dissection now s/p stenting  # Infrarenal MVD  # Moderate aortic stenosis and regurgitation - stable per intracath TTE  # Ascending aortic calcification  # HTN  -IVC collpased. 1 l LR bolus  -aspirin and plavix post stent  -rosuvastatin and zetia  - Volume resuscitate as needed  - CT surgery aware  - Repeat CT ECG-gated -TTE stat upon arrival     PULM:   # Severe COPD, current smoker  - Wean FiO2 to SpO2 > 92%      GI:   - CLD: ADAT  - GI ppx: pantoprazole     /Renal:   Preserved renal function at baseline  Did receive contrast during cath and subsequently for CTA chest with ECG-gating  - Trend RFP, replete lytes per protocol  - Strict I&Os     HEME:   - CBC daily  - DVTppx: SCDs. No chemoppx d/t recent cath     ENDO:   - POCT BG, ISS q4AC/HS  - Maintain BG < 180. Hypoglycemia protocol     ID:   - no evidence of infection right now     F: PRN   E: PRN   N: Regular diet   Access/Tubes: PIV  Antimicrobials: none      DVT prophylaxis:   Scd machine  GI prophylaxis: PPI oral     Bowel Reg: Miralax daily  Oxygen: NC 2 l/min  Code status: Full code   NOK: Dakin,Sally (Spouse)  [phone] (Mobile)       Kimber Bee MD

## 2025-02-11 NOTE — HOSPITAL COURSE
79 y.o. man with PMHx TAA (3.6 cm), AAA (5.4 cm), severe MVD (identified on 12/2024 heart cath as part of pre-op work-up for AAA repair), moderate aortic stenosis and regurgitation, newly diagnosed severe COPD who had complex PCI at [facility] for PCI of LAD, LCx, and RCA given heavily calcified ascending aorta precluding clamping for CABG and high risk comorbidities precluding CABG. Original plan was to perform PCI, then open AAA repair after 6 months of DAPT, then possible TAVR. During high risk PCI on 2/11, there was retrogade LM dissection following Lcx PCI deployment. Patient had CP and hypotension to SBP 50s. Cath team deployed second stent to LM to cover the lesions. He was resuscitated with 2L LR, 1 unit pRBC, 1 unit platelets, and 1 unit cryo. He was also started on levophed. He was then transferred to CICU where TTE showed no signs of tamponade and CTA showed mediastinal and pericardial hemorrhage. He was started on DAPT for LCx stent. He has been stable off levophed since 2/12. Repeat CTA 2/12 showed interval improvement in mediastinal and pericardial hemorrhage. Post-PCI he had elevated LFTs and leukocytosis, which have been downtrending since, suspect shock liver and reactive leukocytosis. Metoprolol succinate 12.5 mg daily was started on 2/14. He continued to require 2L NC on 2/14 so CXR was obtained showing R basilar edema/effusions c/w hypervolemia, and he was diuresed with lasix 40 mg x2 with 2.6 L of urine output. DVT ultrasound negative for DVT in bilateral legs. Diuresis was continued through 2/17 when patient appeared euvolemic and had softer BP. Repeat TTE 2/15 stable compared to 2/12 with small anterior pericardial effusion. Patient continued to require 2L O2 on 2/17 to maintain SpO2 > 92%. Repeat CXR c/f possible RLL pneumonia iso increasing leukocytosis and increased productive cough, so started CTX (2/17 - 2/21) and azithromycin (2/17 - 2/19). Repeat TTE 2/18 was stable with small to moderate  pericardial effusion and no signs of tamponade, EF 68%, severe AV and MV calcifications, moderate AR. Re-evaluated by PT on 2/19 with desat to 86% while ambulating, required 3L NC with exertion, recommended moderate intensity. He was discharged to SNF in stable condition with 2L NC (new O2 requirement) on 2/21/2025. He was started on stiolto for new COPD diagnosis. Pulmonology follow-up for COPD and RUL pulmonary nodule was requested. He has cardiology follow-up on 2/28. Follow-up with vascular surgery and structural heart were requested given original plan to pursue AAA repair and TAVR after complex PCI.

## 2025-02-11 NOTE — Clinical Note
Closure device placed in the right radial artery. Site closed by radial compression system. 13 ml's of air

## 2025-02-11 NOTE — NURSING NOTE
Pt transfer to [facility] CICU bed20 by air,no drips infusing ,family aware,pt awake and alertx4 informed of and all questions answered,right TR band in place ,guidry in place bedside hand-off completed.

## 2025-02-11 NOTE — H&P
History Of Present Illness  Mickey Dakin is a 79 y.o. male presenting for elective complex PCI procedure for MVD 2/11/25. PMHx infrarenal AAA, severe MVD, moderate aortic stenosis and regurgitation, severe COPD. Notably, pt was discussed at CHIP meeting and recommended for PCI of LAD, LCx, and RCA given heavily calcified ascending aorta precluding clamping for CABG and high risk comorbidities precluding MIDCAB.    Pt presented for procedure today. During complex PCI procedure, stent was deployed and team immediately noted hypotension down to 50s SBP requiring levophed, T wave inversion, and blush near distal LM on cath imaging. Pt c/o chest pain. Suspicion for dissection causing by Lcx stent deployment abutting calcific lesion and causing retrograde LM dissection. Cath team was able to deploy stent to cover the lesion and all sxs resolved. Pt admitted to [facility] ICU for further critical care monitoring after complex PCI with LM dissection now s/p stenting.    Immediately upon arrival to ICU, patient continued to complain of 3-4 midsternal chest pain and was exhibiting hypotension to 70-80s SBP. Fluids started. Radiologist notified team of continued leak via secure chat. IVL team (Dr. Gillombardo, Emmanuel CNP) notified immediately and at bedside. Bedside and formal echos performed which demonstrated no evidence of tamponade per verbal report. Patient, family consented verbally for potential arterial line and central line placements. Flight critical care transport requested. ACT of 202.    Past Medical History  Past Medical History:   Diagnosis Date    Abdominal aortic aneurysm (AAA) (CMS-HCC)     CVA (cerebral vascular accident) (Multi) 1994    Hypertension        Surgical History  Past Surgical History:   Procedure Laterality Date    CARDIAC CATHETERIZATION N/A 12/5/2024    Procedure: Left Heart Cath with Coronary Angiography and LV;  Surgeon: Carl B Gillombardo, MD;  Location: [facility] Cardiac Cath Lab;  Service:  "Cardiovascular;  Laterality: N/A;  [facility] THURS DEC 5TH, 2024 AT 9:00 AM PT WILL ARRIVE AT 7:30 AM AT [facility] - DR. GILLOMBARDO        Social History  He reports that he has been smoking cigarettes. He started smoking about 60 years ago. He has a 30.1 pack-year smoking history. He has never used smokeless tobacco. He reports that he does not drink alcohol and does not use drugs.    Family History  Family History   Problem Relation Name Age of Onset    Aortic aneurysm Neg Hx          Allergies  Patient has no known allergies.    Review of Systems   (+) 3-4/10 midsternal chest pain  \"Has to pee\"  Denies headache, dizziness    Physical Exam  Constitutional:       Comments: Cachetic elderly male laying in bed, family (son and daughter in law, daughter and wife) at bedside being updated by Dr. Gillombardo   Cardiovascular:      Rate and Rhythm: Regular rhythm. Tachycardia present.      Comments: Occasional PVC  Pulmonary:      Effort: No respiratory distress.      Breath sounds: No stridor. No wheezing.      Comments: Clear bilaterally but diminished, barrel chest  Abdominal:      General: There is no distension.      Palpations: Abdomen is soft.      Tenderness: There is no abdominal tenderness.   Musculoskeletal:      Comments: Rt wrist occluded with TR  band. BP cuff present on Rt forearm for manual pressure of hematoma   Skin:     General: Skin is warm.      Coloration: Skin is pale.   Neurological:      General: No focal deficit present.      Mental Status: He is alert and oriented to person, place, and time.   Psychiatric:         Behavior: Behavior normal.      Comments: Anxious       Last Recorded Vitals  Blood pressure 122/69, pulse 85, resp. rate 20, weight 55.8 kg (123 lb), SpO2 95%.    Relevant Results  Scheduled medications  [Held by provider] amLODIPine, 10 mg, oral, Daily  [START ON 2/12/2025] ezetimibe, 10 mg, oral, Daily  insulin lispro, 0-5 Units, subcutaneous, TID AC  ipratropium-albuteroL, 3 mL, " "nebulization, q6h  oxygen, , inhalation, Continuous - 02/gases  perflutren lipid microspheres, 0.5-10 mL of dilution, intravenous, Once in imaging  perflutren protein A microsphere, 0.5 mL, intravenous, Once in imaging  [START ON 2/12/2025] rosuvastatin, 40 mg, oral, Nightly  sodium chloride, 1,000 mL, intravenous, Once  sulfur hexafluoride microsphr, 2 mL, intravenous, Once in imaging  [Held by provider] valsartan, 160 mg, oral, Daily      Continuous medications  norepinephrine, 0-0.2 mcg/kg/min  sodium chloride 0.9%, 125 mL/hr      PRN medications  PRN medications: acetaminophen **OR** acetaminophen **OR** acetaminophen, dextrose, dextrose, glucagon, glucagon, ondansetron, oxygen    LABS:  CMP:      No lab exists for component: \"CA\"  CBC:    COAG:     HEME/ENDO:     CARDIAC:        Assessment/Plan   Assessment & Plan  Unstable angina (Multi)    Status post insertion of drug eluting coronary artery stent    Mickey Dakin is a 79 y.o. male presenting for elective complex PCI procedure for MVD 2/11/25. PMHx infrarenal AAA, severe MVD, moderate aortic stenosis and regurgitation, severe COPD. Notably, pt was discussed at CHIP meeting and recommended for PCI of LAD, LCx, and RCA given heavily calcified ascending aorta precluding clamping for CABG and high risk comorbidities precluding MIDCAB.    Pt presented for procedure today. During complex PCI procedure, stent was deployed and team immediately noted hypotension down to 50s SBP requiring levophed, T wave inversion, and blush near distal LM on cath imaging. Pt c/o chest pain. Suspicion for dissection causing by Lcx stent deployment abutting calcific lesion and causing retrograde LM dissection. Cath team was able to deploy stent to cover the lesion and all sxs resolved. Pt admitted to [facility] ICU for further critical care monitoring after complex PCI with LM dissection now s/p stenting.    Immediately upon arrival to ICU, patient continued to complain of 3-4 midsternal " chest pain and was exhibiting hypotension to 70-80s SBP. Fluids started. Radiologist notified team of continued leak via secure chat. IVL team (Dr. Gillombardo, Emmanuel SMALLWOOD) notified immediately and at bedside. Bedside and formal echos performed which demonstrated no evidence of tamponade per verbal report. Patient, family consented verbally for potential arterial line and central line placements. Flight critical care transport requested. ACT of 202.    NEURO:   # Hx remote CVA  - Serial neuro and pain assessments  - A-F bundle  - Analgesia: PRN Tylenol    CV:   # Severe MVD s/p complex PCI to LAD, Lcx, RCA with LM dissection now s/p stenting  # Infrarenal MVD  # Moderate aortic stenosis and regurgitation - stable per intracath TTE  # Ascending aortic calcification  # HTN  CTA chest ECG-gating complete; reviewed by IVL cards team  - Cont EKG, NIBP  - Maintain MAP > 65  - Volume resuscitate as needed  - Home zetia, statin tomorrow AM  - Hold home norvasc, valsartan (felodipine interchange) for now  - CT surgery aware; Dr. Gillombardo discussed via telephone with Dr. Sharp  - 1L NS and maintenance fluid  - Repeat CT ECG-gated in 2hrs (approx 1600)    PULM:   # Severe COPD, current smoker  - Wean FiO2 to SpO2 > 92%    GI:   - CLD: ADAT  - GI ppx: no indication, not home med    /Renal:   Preserved renal function at baseline  Did receive contrast during cath and subsequently for CTA chest with ECG-gating  - Trend RFP, replete lytes per protocol  - Strict I&Os    HEME:   - CBC daily  - DVTppx: SCDs. No chemoppx d/t recent cath    ENDO:   - POCT BG, ISS q4AC/HS  - Maintain BG < 180. Hypoglycemia protocol    ID:   - Trend temp, WBCs    Dispo: admit to ICU. Plan for transfer to [facility]; patient is already accepted by Dr. Stanley per IVL cards team.    This critically ill patient continues to be at-risk for clinically significant deterioration / failure due to the above mentioned dysfunctional, unstable organ systems.  I have  personally identified and managed all complex critical care issues in an effort to prevent aforementioned clinical deterioration.  Critical care time is spent at bedside and/or the immediate area and has included, but is not limited to, the review of diagnostic tests, labs, radiographs, serial assessments of hemodynamics, respiratory status, ventilatory management, and family updates.  Time spent in procedures and teaching are reported separately.    CRITICAL CARE TIME: 90 minutes       Liz Og PA-C

## 2025-02-11 NOTE — Clinical Note
Inflation 1: Pressure = 16 benita; Duration = 20 sec. Inflation 2: Pressure = 16 benita; Duration = 20 sec. Inflation 3: Pressure = 16 benita; Duration = 10 sec. Inflation 4: Pressure = 14 benita; Duration = 10 sec. negative - No discharge, No redness

## 2025-02-12 ENCOUNTER — APPOINTMENT (OUTPATIENT)
Dept: RADIOLOGY | Facility: HOSPITAL | Age: 80
End: 2025-02-12
Payer: MEDICARE

## 2025-02-12 ENCOUNTER — APPOINTMENT (OUTPATIENT)
Dept: CARDIOLOGY | Facility: HOSPITAL | Age: 80
DRG: 205 | End: 2025-02-12
Payer: MEDICARE

## 2025-02-12 LAB
ALBUMIN SERPL BCP-MCNC: 3.4 G/DL (ref 3.4–5)
ALP SERPL-CCNC: 68 U/L (ref 33–136)
ALT SERPL W P-5'-P-CCNC: 1530 U/L (ref 10–52)
ANION GAP BLDA CALCULATED.4IONS-SCNC: 10 MMO/L (ref 10–25)
ANION GAP BLDA CALCULATED.4IONS-SCNC: 10 MMO/L (ref 10–25)
ANION GAP BLDA CALCULATED.4IONS-SCNC: 11 MMO/L (ref 10–25)
ANION GAP BLDA CALCULATED.4IONS-SCNC: 6 MMO/L (ref 10–25)
ANION GAP BLDA CALCULATED.4IONS-SCNC: ABNORMAL MMOL/L
ANION GAP SERPL CALC-SCNC: 12 MMOL/L (ref 10–20)
APPEARANCE UR: CLEAR
AST SERPL W P-5'-P-CCNC: 1105 U/L (ref 9–39)
BASE EXCESS BLDA CALC-SCNC: -2.9 MMOL/L (ref -2–3)
BASE EXCESS BLDA CALC-SCNC: -3.1 MMOL/L (ref -2–3)
BASE EXCESS BLDA CALC-SCNC: -3.4 MMOL/L (ref -2–3)
BASE EXCESS BLDA CALC-SCNC: -3.8 MMOL/L (ref -2–3)
BASE EXCESS BLDA CALC-SCNC: -4.5 MMOL/L (ref -2–3)
BASOPHILS # BLD AUTO: 0.04 X10*3/UL (ref 0–0.1)
BASOPHILS NFR BLD AUTO: 0.2 %
BILIRUB SERPL-MCNC: 0.8 MG/DL (ref 0–1.2)
BILIRUB UR STRIP.AUTO-MCNC: NEGATIVE MG/DL
BLOOD EXPIRATION DATE: NORMAL
BODY TEMPERATURE: 37 DEGREES CELSIUS
BUN SERPL-MCNC: 17 MG/DL (ref 6–23)
CA-I BLDA-SCNC: 1.17 MMOL/L (ref 1.1–1.33)
CA-I BLDA-SCNC: 1.2 MMOL/L (ref 1.1–1.33)
CA-I BLDA-SCNC: 1.2 MMOL/L (ref 1.1–1.33)
CA-I BLDA-SCNC: 1.22 MMOL/L (ref 1.1–1.33)
CA-I BLDA-SCNC: 1.29 MMOL/L (ref 1.1–1.33)
CALCIUM SERPL-MCNC: 8.9 MG/DL (ref 8.6–10.6)
CHLORIDE BLDA-SCNC: 101 MMOL/L (ref 98–107)
CHLORIDE BLDA-SCNC: 101 MMOL/L (ref 98–107)
CHLORIDE BLDA-SCNC: 102 MMOL/L (ref 98–107)
CHLORIDE BLDA-SCNC: 104 MMOL/L (ref 98–107)
CHLORIDE BLDA-SCNC: ABNORMAL MMOL/L
CHLORIDE SERPL-SCNC: 101 MMOL/L (ref 98–107)
CO2 SERPL-SCNC: 25 MMOL/L (ref 21–32)
COLOR UR: YELLOW
CREAT SERPL-MCNC: 0.72 MG/DL (ref 0.5–1.3)
DISPENSE STATUS: NORMAL
EGFRCR SERPLBLD CKD-EPI 2021: >90 ML/MIN/1.73M*2
EJECTION FRACTION: 63 %
EJECTION FRACTION: 75 %
EOSINOPHIL # BLD AUTO: 0.01 X10*3/UL (ref 0–0.4)
EOSINOPHIL NFR BLD AUTO: 0.1 %
ERYTHROCYTE [DISTWIDTH] IN BLOOD BY AUTOMATED COUNT: 13.9 % (ref 11.5–14.5)
GLUCOSE BLD MANUAL STRIP-MCNC: 106 MG/DL (ref 74–99)
GLUCOSE BLD MANUAL STRIP-MCNC: 128 MG/DL (ref 74–99)
GLUCOSE BLD MANUAL STRIP-MCNC: 131 MG/DL (ref 74–99)
GLUCOSE BLD MANUAL STRIP-MCNC: 97 MG/DL (ref 74–99)
GLUCOSE BLDA-MCNC: 100 MG/DL (ref 74–99)
GLUCOSE BLDA-MCNC: 110 MG/DL (ref 74–99)
GLUCOSE BLDA-MCNC: 125 MG/DL (ref 74–99)
GLUCOSE BLDA-MCNC: 146 MG/DL (ref 74–99)
GLUCOSE BLDA-MCNC: 86 MG/DL (ref 74–99)
GLUCOSE SERPL-MCNC: 142 MG/DL (ref 74–99)
GLUCOSE UR STRIP.AUTO-MCNC: NORMAL MG/DL
HCO3 BLDA-SCNC: 22.1 MMOL/L (ref 22–26)
HCO3 BLDA-SCNC: 22.6 MMOL/L (ref 22–26)
HCO3 BLDA-SCNC: 23.1 MMOL/L (ref 22–26)
HCO3 BLDA-SCNC: 23.6 MMOL/L (ref 22–26)
HCO3 BLDA-SCNC: 24 MMOL/L (ref 22–26)
HCT VFR BLD AUTO: 31.6 % (ref 41–52)
HCT VFR BLD EST: 29 % (ref 41–52)
HCT VFR BLD EST: 30 % (ref 41–52)
HCT VFR BLD EST: 33 % (ref 41–52)
HCT VFR BLD EST: 34 % (ref 41–52)
HCT VFR BLD EST: 35 % (ref 41–52)
HGB BLD-MCNC: 11.3 G/DL (ref 13.5–17.5)
HGB BLDA-MCNC: 10 G/DL (ref 13.5–17.5)
HGB BLDA-MCNC: 11.1 G/DL (ref 13.5–17.5)
HGB BLDA-MCNC: 11.2 G/DL (ref 13.5–17.5)
HGB BLDA-MCNC: 11.7 G/DL (ref 13.5–17.5)
HGB BLDA-MCNC: 9.8 G/DL (ref 13.5–17.5)
IMM GRANULOCYTES # BLD AUTO: 0.14 X10*3/UL (ref 0–0.5)
IMM GRANULOCYTES NFR BLD AUTO: 0.7 % (ref 0–0.9)
INHALED O2 CONCENTRATION: 21 %
INHALED O2 CONCENTRATION: 21 %
INHALED O2 CONCENTRATION: 28 %
KETONES UR STRIP.AUTO-MCNC: NEGATIVE MG/DL
LACTATE BLDA-SCNC: 1.3 MMOL/L (ref 0.4–2)
LACTATE BLDA-SCNC: 1.6 MMOL/L (ref 0.4–2)
LACTATE BLDA-SCNC: 1.8 MMOL/L (ref 0.4–2)
LACTATE BLDA-SCNC: 1.9 MMOL/L (ref 0.4–2)
LACTATE BLDA-SCNC: 2.4 MMOL/L (ref 0.4–2)
LACTATE SERPL-SCNC: 2 MMOL/L (ref 0.4–2)
LEUKOCYTE ESTERASE UR QL STRIP.AUTO: NEGATIVE
LYMPHOCYTES # BLD AUTO: 1.21 X10*3/UL (ref 0.8–3)
LYMPHOCYTES NFR BLD AUTO: 6.1 %
MAGNESIUM SERPL-MCNC: 1.77 MG/DL (ref 1.6–2.4)
MCH RBC QN AUTO: 32.7 PG (ref 26–34)
MCHC RBC AUTO-ENTMCNC: 35.8 G/DL (ref 32–36)
MCV RBC AUTO: 91 FL (ref 80–100)
MONOCYTES # BLD AUTO: 1.04 X10*3/UL (ref 0.05–0.8)
MONOCYTES NFR BLD AUTO: 5.2 %
MUCOUS THREADS #/AREA URNS AUTO: NORMAL /LPF
NEUTROPHILS # BLD AUTO: 17.46 X10*3/UL (ref 1.6–5.5)
NEUTROPHILS NFR BLD AUTO: 87.7 %
NITRITE UR QL STRIP.AUTO: NEGATIVE
NRBC BLD-RTO: 0 /100 WBCS (ref 0–0)
OXYHGB MFR BLDA: 94.2 % (ref 94–98)
OXYHGB MFR BLDA: 94.5 % (ref 94–98)
OXYHGB MFR BLDA: 94.5 % (ref 94–98)
OXYHGB MFR BLDA: 95.4 % (ref 94–98)
OXYHGB MFR BLDA: 95.9 % (ref 94–98)
PCO2 BLDA: 46 MM HG (ref 38–42)
PCO2 BLDA: 46 MM HG (ref 38–42)
PCO2 BLDA: 47 MM HG (ref 38–42)
PCO2 BLDA: 48 MM HG (ref 38–42)
PCO2 BLDA: 51 MM HG (ref 38–42)
PH BLDA: 7.28 PH (ref 7.38–7.42)
PH BLDA: 7.29 PH (ref 7.38–7.42)
PH BLDA: 7.3 PH (ref 7.38–7.42)
PH UR STRIP.AUTO: 6 [PH]
PHOSPHATE SERPL-MCNC: 5.4 MG/DL (ref 2.5–4.9)
PLATELET # BLD AUTO: 224 X10*3/UL (ref 150–450)
PO2 BLDA: 77 MM HG (ref 85–95)
PO2 BLDA: 82 MM HG (ref 85–95)
PO2 BLDA: 84 MM HG (ref 85–95)
PO2 BLDA: 86 MM HG (ref 85–95)
PO2 BLDA: 92 MM HG (ref 85–95)
POTASSIUM BLDA-SCNC: 4.5 MMOL/L (ref 3.5–5.3)
POTASSIUM BLDA-SCNC: 4.7 MMOL/L (ref 3.5–5.3)
POTASSIUM BLDA-SCNC: 5 MMOL/L (ref 3.5–5.3)
POTASSIUM BLDA-SCNC: 5.1 MMOL/L (ref 3.5–5.3)
POTASSIUM BLDA-SCNC: 5.2 MMOL/L (ref 3.5–5.3)
POTASSIUM SERPL-SCNC: 5 MMOL/L (ref 3.5–5.3)
PROCALCITONIN SERPL-MCNC: 0.15 NG/ML
PRODUCT BLOOD TYPE: 5100
PRODUCT CODE: NORMAL
PROT SERPL-MCNC: 5.2 G/DL (ref 6.4–8.2)
PROT UR STRIP.AUTO-MCNC: ABNORMAL MG/DL
RBC # BLD AUTO: 3.46 X10*6/UL (ref 4.5–5.9)
RBC # UR STRIP.AUTO: ABNORMAL MG/DL
RBC #/AREA URNS AUTO: NORMAL /HPF
SAO2 % BLDA: 95 % (ref 94–100)
SAO2 % BLDA: 96 % (ref 94–100)
SAO2 % BLDA: 96 % (ref 94–100)
SAO2 % BLDA: 97 % (ref 94–100)
SAO2 % BLDA: 98 % (ref 94–100)
SODIUM BLDA-SCNC: 129 MMOL/L (ref 136–145)
SODIUM BLDA-SCNC: 130 MMOL/L (ref 136–145)
SODIUM BLDA-SCNC: 130 MMOL/L (ref 136–145)
SODIUM SERPL-SCNC: 133 MMOL/L (ref 136–145)
SP GR UR STRIP.AUTO: >1.05
SQUAMOUS #/AREA URNS AUTO: NORMAL /HPF
UNIT ABO: NORMAL
UNIT NUMBER: NORMAL
UNIT RH: NORMAL
UNIT VOLUME: 350
UROBILINOGEN UR STRIP.AUTO-MCNC: NORMAL MG/DL
WBC # BLD AUTO: 19.9 X10*3/UL (ref 4.4–11.3)
WBC #/AREA URNS AUTO: NORMAL /HPF
XM INTEP: NORMAL

## 2025-02-12 PROCEDURE — 93325 DOPPLER ECHO COLOR FLOW MAPG: CPT | Performed by: INTERNAL MEDICINE

## 2025-02-12 PROCEDURE — 76937 US GUIDE VASCULAR ACCESS: CPT

## 2025-02-12 PROCEDURE — 82947 ASSAY GLUCOSE BLOOD QUANT: CPT

## 2025-02-12 PROCEDURE — 85025 COMPLETE CBC W/AUTO DIFF WBC: CPT

## 2025-02-12 PROCEDURE — 94640 AIRWAY INHALATION TREATMENT: CPT

## 2025-02-12 PROCEDURE — 71275 CT ANGIOGRAPHY CHEST: CPT | Performed by: RADIOLOGY

## 2025-02-12 PROCEDURE — 2500000001 HC RX 250 WO HCPCS SELF ADMINISTERED DRUGS (ALT 637 FOR MEDICARE OP)

## 2025-02-12 PROCEDURE — 83605 ASSAY OF LACTIC ACID: CPT

## 2025-02-12 PROCEDURE — 84145 PROCALCITONIN (PCT): CPT

## 2025-02-12 PROCEDURE — 93308 TTE F-UP OR LMTD: CPT | Performed by: INTERNAL MEDICINE

## 2025-02-12 PROCEDURE — 36430 TRANSFUSION BLD/BLD COMPNT: CPT

## 2025-02-12 PROCEDURE — 36415 COLL VENOUS BLD VENIPUNCTURE: CPT

## 2025-02-12 PROCEDURE — 87040 BLOOD CULTURE FOR BACTERIA: CPT

## 2025-02-12 PROCEDURE — 83735 ASSAY OF MAGNESIUM: CPT

## 2025-02-12 PROCEDURE — 93321 DOPPLER ECHO F-UP/LMTD STD: CPT | Performed by: INTERNAL MEDICINE

## 2025-02-12 PROCEDURE — 83605 ASSAY OF LACTIC ACID: CPT | Performed by: INTERNAL MEDICINE

## 2025-02-12 PROCEDURE — 37799 UNLISTED PX VASCULAR SURGERY: CPT

## 2025-02-12 PROCEDURE — 84100 ASSAY OF PHOSPHORUS: CPT

## 2025-02-12 PROCEDURE — 2500000004 HC RX 250 GENERAL PHARMACY W/ HCPCS (ALT 636 FOR OP/ED)

## 2025-02-12 PROCEDURE — 85018 HEMOGLOBIN: CPT

## 2025-02-12 PROCEDURE — 2500000002 HC RX 250 W HCPCS SELF ADMINISTERED DRUGS (ALT 637 FOR MEDICARE OP, ALT 636 FOR OP/ED): Performed by: STUDENT IN AN ORGANIZED HEALTH CARE EDUCATION/TRAINING PROGRAM

## 2025-02-12 PROCEDURE — 84295 ASSAY OF SERUM SODIUM: CPT

## 2025-02-12 PROCEDURE — 99291 CRITICAL CARE FIRST HOUR: CPT

## 2025-02-12 PROCEDURE — 82810 BLOOD GASES O2 SAT ONLY: CPT | Performed by: STUDENT IN AN ORGANIZED HEALTH CARE EDUCATION/TRAINING PROGRAM

## 2025-02-12 PROCEDURE — 1200000002 HC GENERAL ROOM WITH TELEMETRY DAILY

## 2025-02-12 PROCEDURE — 99232 SBSQ HOSP IP/OBS MODERATE 35: CPT | Performed by: PHYSICIAN ASSISTANT

## 2025-02-12 PROCEDURE — 71275 CT ANGIOGRAPHY CHEST: CPT

## 2025-02-12 PROCEDURE — 81001 URINALYSIS AUTO W/SCOPE: CPT

## 2025-02-12 PROCEDURE — P9016 RBC LEUKOCYTES REDUCED: HCPCS

## 2025-02-12 PROCEDURE — 2550000001 HC RX 255 CONTRASTS: Performed by: STUDENT IN AN ORGANIZED HEALTH CARE EDUCATION/TRAINING PROGRAM

## 2025-02-12 PROCEDURE — 93325 DOPPLER ECHO COLOR FLOW MAPG: CPT

## 2025-02-12 RX ORDER — POLYETHYLENE GLYCOL 3350 17 G/17G
17 POWDER, FOR SOLUTION ORAL DAILY
Status: DISCONTINUED | OUTPATIENT
Start: 2025-02-12 | End: 2025-02-15

## 2025-02-12 RX ORDER — IPRATROPIUM BROMIDE AND ALBUTEROL SULFATE 2.5; .5 MG/3ML; MG/3ML
3 SOLUTION RESPIRATORY (INHALATION)
Status: DISCONTINUED | OUTPATIENT
Start: 2025-02-13 | End: 2025-02-14

## 2025-02-12 RX ORDER — MAGNESIUM SULFATE HEPTAHYDRATE 40 MG/ML
2 INJECTION, SOLUTION INTRAVENOUS ONCE
Status: COMPLETED | OUTPATIENT
Start: 2025-02-12 | End: 2025-02-12

## 2025-02-12 RX ADMIN — SODIUM CHLORIDE, POTASSIUM CHLORIDE, SODIUM LACTATE AND CALCIUM CHLORIDE 1000 ML: 600; 310; 30; 20 INJECTION, SOLUTION INTRAVENOUS at 09:19

## 2025-02-12 RX ADMIN — IPRATROPIUM BROMIDE AND ALBUTEROL SULFATE 3 ML: .5; 3 SOLUTION RESPIRATORY (INHALATION) at 20:43

## 2025-02-12 RX ADMIN — MAGNESIUM SULFATE HEPTAHYDRATE 2 G: 40 INJECTION, SOLUTION INTRAVENOUS at 02:01

## 2025-02-12 RX ADMIN — SODIUM CHLORIDE, POTASSIUM CHLORIDE, SODIUM LACTATE AND CALCIUM CHLORIDE 1000 ML: 600; 310; 30; 20 INJECTION, SOLUTION INTRAVENOUS at 12:04

## 2025-02-12 RX ADMIN — CLOPIDOGREL BISULFATE 75 MG: 75 TABLET ORAL at 06:07

## 2025-02-12 RX ADMIN — ASPIRIN 81 MG: 81 TABLET, COATED ORAL at 08:20

## 2025-02-12 RX ADMIN — IPRATROPIUM BROMIDE AND ALBUTEROL SULFATE 3 ML: .5; 3 SOLUTION RESPIRATORY (INHALATION) at 01:52

## 2025-02-12 RX ADMIN — IPRATROPIUM BROMIDE AND ALBUTEROL SULFATE 3 ML: .5; 3 SOLUTION RESPIRATORY (INHALATION) at 08:49

## 2025-02-12 RX ADMIN — EZETIMIBE 10 MG: 10 TABLET ORAL at 08:20

## 2025-02-12 RX ADMIN — IPRATROPIUM BROMIDE AND ALBUTEROL SULFATE 3 ML: .5; 3 SOLUTION RESPIRATORY (INHALATION) at 14:33

## 2025-02-12 RX ADMIN — IOHEXOL 90 ML: 350 INJECTION, SOLUTION INTRAVENOUS at 14:19

## 2025-02-12 SDOH — ECONOMIC STABILITY: FOOD INSECURITY: HOW HARD IS IT FOR YOU TO PAY FOR THE VERY BASICS LIKE FOOD, HOUSING, MEDICAL CARE, AND HEATING?: NOT VERY HARD

## 2025-02-12 SDOH — ECONOMIC STABILITY: FOOD INSECURITY: WITHIN THE PAST 12 MONTHS, THE FOOD YOU BOUGHT JUST DIDN'T LAST AND YOU DIDN'T HAVE MONEY TO GET MORE.: NEVER TRUE

## 2025-02-12 SDOH — SOCIAL STABILITY: SOCIAL INSECURITY: WITHIN THE LAST YEAR, HAVE YOU BEEN AFRAID OF YOUR PARTNER OR EX-PARTNER?: NO

## 2025-02-12 SDOH — SOCIAL STABILITY: SOCIAL INSECURITY: ARE YOU MARRIED, WIDOWED, DIVORCED, SEPARATED, NEVER MARRIED, OR LIVING WITH A PARTNER?: MARRIED

## 2025-02-12 SDOH — SOCIAL STABILITY: SOCIAL INSECURITY: WITHIN THE LAST YEAR, HAVE YOU BEEN HUMILIATED OR EMOTIONALLY ABUSED IN OTHER WAYS BY YOUR PARTNER OR EX-PARTNER?: NO

## 2025-02-12 SDOH — ECONOMIC STABILITY: HOUSING INSECURITY: IN THE LAST 12 MONTHS, WAS THERE A TIME WHEN YOU WERE NOT ABLE TO PAY THE MORTGAGE OR RENT ON TIME?: NO

## 2025-02-12 SDOH — ECONOMIC STABILITY: FOOD INSECURITY: WITHIN THE PAST 12 MONTHS, YOU WORRIED THAT YOUR FOOD WOULD RUN OUT BEFORE YOU GOT THE MONEY TO BUY MORE.: NEVER TRUE

## 2025-02-12 SDOH — ECONOMIC STABILITY: INCOME INSECURITY: IN THE PAST 12 MONTHS HAS THE ELECTRIC, GAS, OIL, OR WATER COMPANY THREATENED TO SHUT OFF SERVICES IN YOUR HOME?: NO

## 2025-02-12 SDOH — ECONOMIC STABILITY: HOUSING INSECURITY: AT ANY TIME IN THE PAST 12 MONTHS, WERE YOU HOMELESS OR LIVING IN A SHELTER (INCLUDING NOW)?: NO

## 2025-02-12 SDOH — ECONOMIC STABILITY: TRANSPORTATION INSECURITY: IN THE PAST 12 MONTHS, HAS LACK OF TRANSPORTATION KEPT YOU FROM MEDICAL APPOINTMENTS OR FROM GETTING MEDICATIONS?: NO

## 2025-02-12 SDOH — ECONOMIC STABILITY: HOUSING INSECURITY: IN THE PAST 12 MONTHS, HOW MANY TIMES HAVE YOU MOVED WHERE YOU WERE LIVING?: 0

## 2025-02-12 ASSESSMENT — ACTIVITIES OF DAILY LIVING (ADL)
FEEDING YOURSELF: INDEPENDENT
WALKS IN HOME: INDEPENDENT
ASSISTIVE_DEVICE: EYEGLASSES
JUDGMENT_ADEQUATE_SAFELY_COMPLETE_DAILY_ACTIVITIES: YES
LACK_OF_TRANSPORTATION: NO
BATHING: INDEPENDENT
HEARING - LEFT EAR: FUNCTIONAL
DRESSING YOURSELF: INDEPENDENT
HEARING - RIGHT EAR: FUNCTIONAL
TOILETING: INDEPENDENT
GROOMING: INDEPENDENT
PATIENT'S MEMORY ADEQUATE TO SAFELY COMPLETE DAILY ACTIVITIES?: YES
ADEQUATE_TO_COMPLETE_ADL: YES

## 2025-02-12 ASSESSMENT — PAIN SCALES - GENERAL
PAINLEVEL_OUTOF10: 0 - NO PAIN

## 2025-02-12 ASSESSMENT — PAIN - FUNCTIONAL ASSESSMENT
PAIN_FUNCTIONAL_ASSESSMENT: 0-10

## 2025-02-12 NOTE — PROGRESS NOTES
02/12/25 1019   Discharge Planning   Living Arrangements Spouse/significant other   Support Systems Spouse/significant other;Children   Assistance Needed n/a   Type of Residence Private residence   Who is requesting discharge planning? Provider   Home or Post Acute Services   (TBD)   Does the patient need discharge transport arranged? No   Financial Resource Strain   How hard is it for you to pay for the very basics like food, housing, medical care, and heating? Not very   Housing Stability   In the last 12 months, was there a time when you were not able to pay the mortgage or rent on time? N   In the past 12 months, how many times have you moved where you were living? 0   At any time in the past 12 months, were you homeless or living in a shelter (including now)? N   Transportation Needs   In the past 12 months, has lack of transportation kept you from medical appointments or from getting medications? no   In the past 12 months, has lack of transportation kept you from meetings, work, or from getting things needed for daily living? No     - ICU TREATMENT PLAN: Patient  presented to [facility] CICU with mediastinal hematoma.  - Payer: [facility], [employer]  -Support System: Spouse, children  - Planned Disposition: Pending medical outcome and rehab recommendations.  - Additional Information: SDOH and Social Work Discharge Planning assessments were completed with the patient. There were no SDOH issues identified.  - Barriers to discharge: None at this time. SW will continue to follow.

## 2025-02-12 NOTE — PROGRESS NOTES
"CARDIAC SURGERY CONSULT PROGRESS NOTE    SUBJECTIVE  Mickey Dakin is a 79 y.o. male with a history of infrarenal AAA, severe mvCAD, moderate AS and AI, severe COPD (FEV1 0.59 24%). The patient was recently discussed at the CHIP meeting for his coronary artery disease and decision was made for complex PCI of LAD, LCx, and RCA given heavily calcified ascending aorta precluding CABG and high-risk comorbidities precluding MIDCAB.      On 2/11/25 the patient underwent an elective complex PCI. During complex PCI procedure, stent was deployed and team immediately noted hypotension down to 50s SBP requiring levophed, T wave inversion, and blush near distal LM on cath imaging. Pt c/o chest pain. Suspicion for dissection causing by Lcx stent deployment abutting calcific lesion and causing retrograde LM dissection. Cath team was able to deploy stent to cover the lesion and all sxs resolved.      Immediately upon arrival to ICU, patient continued to complain of 3-4 midsternal chest pain and was exhibiting hypotension to 70-80s SBP. Fluids started. Bedside and formal echos performed which demonstrated no evidence of tamponade per verbal report. CT with residual contrast tracking along aortic root/sinus, small pericardial effusion.      Patient transferred to [facility] [place] for continuity of care.  Cardiac surgery consulted for surgical evaluation.    Objective   /55   Pulse 102   Temp 36.7 °C (98.1 °F) (Temporal)   Resp 16   Ht 1.676 m (5' 6\")   Wt 57.5 kg (126 lb 12.2 oz)   SpO2 93%   BMI 20.46 kg/m²   0-10 (Numeric) Pain Score: 0 - No pain   Vitals:    02/12/25 0400   Weight: 57.5 kg (126 lb 12.2 oz)          Intake/Output Summary (Last 24 hours) at 2/12/2025 1510  Last data filed at 2/12/2025 1400  Gross per 24 hour   Intake 5730.55 ml   Output 600 ml   Net 5130.55 ml         Medications  Scheduled medications  [Held by provider] amLODIPine, 10 mg, oral, Daily  aspirin, 81 mg, oral, Daily  clopidogrel, 75 mg, oral, " Daily  [Held by provider] ezetimibe, 10 mg, oral, Daily  insulin lispro, 0-5 Units, subcutaneous, TID AC  ipratropium-albuteroL, 3 mL, nebulization, q6h  perflutren lipid microspheres, 0.5-10 mL of dilution, intravenous, Once in imaging  perflutren protein A microsphere, 0.5 mL, intravenous, Once in imaging  polyethylene glycol, 17 g, oral, Daily  [Held by provider] rosuvastatin, 40 mg, oral, Nightly  sulfur hexafluoride microsphr, 2 mL, intravenous, Once in imaging  [Held by provider] valsartan, 160 mg, oral, Daily    Continuous medications  norepinephrine, 0.01-1 mcg/kg/min, Last Rate: Stopped (02/12/25 1444)    PRN medications  PRN medications: acetaminophen **OR** acetaminophen **OR** acetaminophen, dextrose, dextrose, glucagon, glucagon, ondansetron, oxygen    Labs  Results for orders placed or performed during the hospital encounter of 02/11/25 (from the past 24 hours)   Transthoracic Echo (TTE) Limited   Result Value Ref Range    LV EF 75 %   Transthoracic Echo (TTE) Limited   Result Value Ref Range    AV pk nanci 2.60 m/s    AV mn grad 14 mmHg    LV EF 73 %    RVSP 35.7 mmHg    AV pk grad 27 mmHg    LV A4C EF 82.6    ACTIVATED CLOTTING TIME LOW   Result Value Ref Range    POCT Activated Clotting Time Low Range 168 83 - 199 sec   POCT GLUCOSE   Result Value Ref Range    POCT Glucose 131 (H) 74 - 99 mg/dL   Lactate   Result Value Ref Range    Lactate 3.6 (H) 0.4 - 2.0 mmol/L   CBC and Auto Differential   Result Value Ref Range    WBC 17.8 (H) 4.4 - 11.3 x10*3/uL    nRBC 0.0 0.0 - 0.0 /100 WBCs    RBC 3.08 (L) 4.50 - 5.90 x10*6/uL    Hemoglobin 10.4 (L) 13.5 - 17.5 g/dL    Hematocrit 30.4 (L) 41.0 - 52.0 %    MCV 99 80 - 100 fL    MCH 33.8 26.0 - 34.0 pg    MCHC 34.2 32.0 - 36.0 g/dL    RDW 12.9 11.5 - 14.5 %    Platelets 264 150 - 450 x10*3/uL    Neutrophils % 88.3 40.0 - 80.0 %    Immature Granulocytes %, Automated 0.5 0.0 - 0.9 %    Lymphocytes % 5.1 13.0 - 44.0 %    Monocytes % 5.5 2.0 - 10.0 %    Eosinophils  % 0.3 0.0 - 6.0 %    Basophils % 0.3 0.0 - 2.0 %    Neutrophils Absolute 15.69 (H) 1.60 - 5.50 x10*3/uL    Immature Granulocytes Absolute, Automated 0.09 0.00 - 0.50 x10*3/uL    Lymphocytes Absolute 0.91 0.80 - 3.00 x10*3/uL    Monocytes Absolute 0.98 (H) 0.05 - 0.80 x10*3/uL    Eosinophils Absolute 0.05 0.00 - 0.40 x10*3/uL    Basophils Absolute 0.05 0.00 - 0.10 x10*3/uL   Blood Gas Arterial Full Panel   Result Value Ref Range    POCT pH, Arterial 7.26 (L) 7.38 - 7.42 pH    POCT pCO2, Arterial 46 (H) 38 - 42 mm Hg    POCT pO2, Arterial 65 (L) 85 - 95 mm Hg    POCT SO2, Arterial 90 (L) 94 - 100 %    POCT Oxy Hemoglobin, Arterial 89.1 (L) 94.0 - 98.0 %    POCT Hematocrit Calculated, Arterial 31.0 (L) 41.0 - 52.0 %    POCT Sodium, Arterial 129 (L) 136 - 145 mmol/L    POCT Potassium, Arterial 5.7 (H) 3.5 - 5.3 mmol/L    POCT Chloride, Arterial 102 98 - 107 mmol/L    POCT Ionized Calcium, Arterial 1.19 1.10 - 1.33 mmol/L    POCT Glucose, Arterial 126 (H) 74 - 99 mg/dL    POCT Lactate, Arterial 3.0 (H) 0.4 - 2.0 mmol/L    POCT Base Excess, Arterial -6.3 (L) -2.0 - 3.0 mmol/L    POCT HCO3 Calculated, Arterial 20.6 (L) 22.0 - 26.0 mmol/L    POCT Hemoglobin, Arterial 10.4 (L) 13.5 - 17.5 g/dL    POCT Anion Gap, Arterial 12 10 - 25 mmo/L    Patient Temperature 37.0 degrees Celsius    FiO2 21 %   Prepare RBC: 1 Units   Result Value Ref Range    PRODUCT CODE G5460S56     Unit Number [id number]     Unit ABO O     Unit RH POS     XM INTEP COMP     Dispense Status TR     Blood Expiration Date 3/7/2025 11:59:00 PM EST     PRODUCT BLOOD TYPE 5100     UNIT VOLUME 287    Prepare Platelets: 1 Units   Result Value Ref Range    PRODUCT CODE N3931C55     Unit Number [id number]     Unit ABO A     Unit RH POS     Dispense Status TR     Blood Expiration Date 2/12/2025 11:59:00 PM EST     PRODUCT BLOOD TYPE 6200     UNIT VOLUME 203    Prepare Cryoprecipitated AHF (Pooled Units): 1 Pools   Result Value Ref Range    PRODUCT CODE  X0902S04     Unit Number [id number]     Unit ABO O     Unit RH POS     Dispense Status TR     Blood Expiration Date 2/12/2025  1:45:00 AM EST     PRODUCT BLOOD TYPE 5100     UNIT VOLUME 65    Verify ABO/Rh Group Test (VERAB)   Result Value Ref Range    ABO TYPE O     Rh TYPE POS    BLOOD GAS ARTERIAL FULL PANEL   Result Value Ref Range    POCT pH, Arterial 7.26 (L) 7.38 - 7.42 pH    POCT pCO2, Arterial 47 (H) 38 - 42 mm Hg    POCT pO2, Arterial 59 (L) 85 - 95 mm Hg    POCT SO2, Arterial 90 (L) 94 - 100 %    POCT Oxy Hemoglobin, Arterial 87.7 (L) 94.0 - 98.0 %    POCT Hematocrit Calculated, Arterial 30.0 (L) 41.0 - 52.0 %    POCT Sodium, Arterial 130 (L) 136 - 145 mmol/L    POCT Potassium, Arterial 5.4 (H) 3.5 - 5.3 mmol/L    POCT Chloride, Arterial 102 98 - 107 mmol/L    POCT Ionized Calcium, Arterial 1.16 1.10 - 1.33 mmol/L    POCT Glucose, Arterial 129 (H) 74 - 99 mg/dL    POCT Lactate, Arterial 2.8 (H) 0.4 - 2.0 mmol/L    POCT Base Excess, Arterial -5.8 (L) -2.0 - 3.0 mmol/L    POCT HCO3 Calculated, Arterial 21.1 (L) 22.0 - 26.0 mmol/L    POCT Hemoglobin, Arterial 10.1 (L) 13.5 - 17.5 g/dL    POCT Anion Gap, Arterial 12 10 - 25 mmo/L    Patient Temperature 37.0 degrees Celsius    FiO2 21 %   Blood Gas Arterial Full Panel   Result Value Ref Range    POCT pH, Arterial 7.28 (L) 7.38 - 7.42 pH    POCT pCO2, Arterial 43 (H) 38 - 42 mm Hg    POCT pO2, Arterial 106 (H) 85 - 95 mm Hg    POCT SO2, Arterial 98 94 - 100 %    POCT Oxy Hemoglobin, Arterial 95.7 94.0 - 98.0 %    POCT Hematocrit Calculated, Arterial 34.0 (L) 41.0 - 52.0 %    POCT Sodium, Arterial 132 (L) 136 - 145 mmol/L    POCT Potassium, Arterial 4.8 3.5 - 5.3 mmol/L    POCT Chloride, Arterial 103 98 - 107 mmol/L    POCT Ionized Calcium, Arterial 1.05 (L) 1.10 - 1.33 mmol/L    POCT Glucose, Arterial 130 (H) 74 - 99 mg/dL    POCT Lactate, Arterial 2.3 (H) 0.4 - 2.0 mmol/L    POCT Base Excess, Arterial -6.3 (L) -2.0 - 3.0 mmol/L    POCT HCO3 Calculated,  Arterial 20.2 (L) 22.0 - 26.0 mmol/L    POCT Hemoglobin, Arterial 11.4 (L) 13.5 - 17.5 g/dL    POCT Anion Gap, Arterial 14 10 - 25 mmo/L    Patient Temperature 37.0 degrees Celsius    FiO2 21 %   Prepare RBC: 1 Units   Result Value Ref Range    PRODUCT CODE C5120T00     Unit Number [id number]     Unit ABO O     Unit RH POS     XM INTEP COMP     Dispense Status IS     Blood Expiration Date 3/2/2025 11:59:00 PM EST     PRODUCT BLOOD TYPE 5100     UNIT VOLUME 350    CBC and Auto Differential   Result Value Ref Range    WBC 19.9 (H) 4.4 - 11.3 x10*3/uL    nRBC 0.0 0.0 - 0.0 /100 WBCs    RBC 3.46 (L) 4.50 - 5.90 x10*6/uL    Hemoglobin 11.3 (L) 13.5 - 17.5 g/dL    Hematocrit 31.6 (L) 41.0 - 52.0 %    MCV 91 80 - 100 fL    MCH 32.7 26.0 - 34.0 pg    MCHC 35.8 32.0 - 36.0 g/dL    RDW 13.9 11.5 - 14.5 %    Platelets 224 150 - 450 x10*3/uL    Neutrophils % 87.7 40.0 - 80.0 %    Immature Granulocytes %, Automated 0.7 0.0 - 0.9 %    Lymphocytes % 6.1 13.0 - 44.0 %    Monocytes % 5.2 2.0 - 10.0 %    Eosinophils % 0.1 0.0 - 6.0 %    Basophils % 0.2 0.0 - 2.0 %    Neutrophils Absolute 17.46 (H) 1.60 - 5.50 x10*3/uL    Immature Granulocytes Absolute, Automated 0.14 0.00 - 0.50 x10*3/uL    Lymphocytes Absolute 1.21 0.80 - 3.00 x10*3/uL    Monocytes Absolute 1.04 (H) 0.05 - 0.80 x10*3/uL    Eosinophils Absolute 0.01 0.00 - 0.40 x10*3/uL    Basophils Absolute 0.04 0.00 - 0.10 x10*3/uL   Comprehensive metabolic panel   Result Value Ref Range    Glucose 142 (H) 74 - 99 mg/dL    Sodium 133 (L) 136 - 145 mmol/L    Potassium 5.0 3.5 - 5.3 mmol/L    Chloride 101 98 - 107 mmol/L    Bicarbonate 25 21 - 32 mmol/L    Anion Gap 12 10 - 20 mmol/L    Urea Nitrogen 17 6 - 23 mg/dL    Creatinine 0.72 0.50 - 1.30 mg/dL    eGFR >90 >60 mL/min/1.73m*2    Calcium 8.9 8.6 - 10.6 mg/dL    Albumin 3.4 3.4 - 5.0 g/dL    Alkaline Phosphatase 68 33 - 136 U/L    Total Protein 5.2 (L) 6.4 - 8.2 g/dL    AST 1,105 (H) 9 - 39 U/L    Bilirubin, Total 0.8 0.0 -  1.2 mg/dL    ALT 1,530 (H) 10 - 52 U/L   Magnesium   Result Value Ref Range    Magnesium 1.77 1.60 - 2.40 mg/dL   Phosphorus   Result Value Ref Range    Phosphorus 5.4 (H) 2.5 - 4.9 mg/dL   Lactate   Result Value Ref Range    Lactate 2.0 0.4 - 2.0 mmol/L   Blood Gas Arterial Full Panel   Result Value Ref Range    POCT pH, Arterial 7.28 (L) 7.38 - 7.42 pH    POCT pCO2, Arterial 51 (H) 38 - 42 mm Hg    POCT pO2, Arterial 92 85 - 95 mm Hg    POCT SO2, Arterial 98 94 - 100 %    POCT Oxy Hemoglobin, Arterial 95.9 94.0 - 98.0 %    POCT Hematocrit Calculated, Arterial 35.0 (L) 41.0 - 52.0 %    POCT Sodium, Arterial 130 (L) 136 - 145 mmol/L    POCT Potassium, Arterial 5.2 3.5 - 5.3 mmol/L    POCT Chloride, Arterial 101 98 - 107 mmol/L    POCT Ionized Calcium, Arterial 1.29 1.10 - 1.33 mmol/L    POCT Glucose, Arterial 146 (H) 74 - 99 mg/dL    POCT Lactate, Arterial 1.6 0.4 - 2.0 mmol/L    POCT Base Excess, Arterial -3.1 (L) -2.0 - 3.0 mmol/L    POCT HCO3 Calculated, Arterial 24.0 22.0 - 26.0 mmol/L    POCT Hemoglobin, Arterial 11.7 (L) 13.5 - 17.5 g/dL    POCT Anion Gap, Arterial 10 10 - 25 mmo/L    Patient Temperature 37.0 degrees Celsius    FiO2 21 %   Procalcitonin   Result Value Ref Range    Procalcitonin 0.15 (H) <=0.07 ng/mL   Blood Gas Arterial Full Panel   Result Value Ref Range    POCT pH, Arterial 7.29 (L) 7.38 - 7.42 pH    POCT pCO2, Arterial 46 (H) 38 - 42 mm Hg    POCT pO2, Arterial 77 (L) 85 - 95 mm Hg    POCT SO2, Arterial 96 94 - 100 %    POCT Oxy Hemoglobin, Arterial 94.2 94.0 - 98.0 %    POCT Hematocrit Calculated, Arterial 34.0 (L) 41.0 - 52.0 %    POCT Sodium, Arterial 130 (L) 136 - 145 mmol/L    POCT Potassium, Arterial 5.1 3.5 - 5.3 mmol/L    POCT Chloride, Arterial 102 98 - 107 mmol/L    POCT Ionized Calcium, Arterial 1.22 1.10 - 1.33 mmol/L    POCT Glucose, Arterial 125 (H) 74 - 99 mg/dL    POCT Lactate, Arterial 1.9 0.4 - 2.0 mmol/L    POCT Base Excess, Arterial -4.5 (L) -2.0 - 3.0 mmol/L    POCT  HCO3 Calculated, Arterial 22.1 22.0 - 26.0 mmol/L    POCT Hemoglobin, Arterial 11.2 (L) 13.5 - 17.5 g/dL    POCT Anion Gap, Arterial 11 10 - 25 mmo/L    Patient Temperature 37.0 degrees Celsius    FiO2 21 %   POCT GLUCOSE   Result Value Ref Range    POCT Glucose 131 (H) 74 - 99 mg/dL   Blood Gas Arterial Full Panel   Result Value Ref Range    POCT pH, Arterial 7.30 (L) 7.38 - 7.42 pH    POCT pCO2, Arterial 47 (H) 38 - 42 mm Hg    POCT pO2, Arterial 82 (L) 85 - 95 mm Hg    POCT SO2, Arterial 96 94 - 100 %    POCT Oxy Hemoglobin, Arterial 94.5 94.0 - 98.0 %    POCT Hematocrit Calculated, Arterial 33.0 (L) 41.0 - 52.0 %    POCT Sodium, Arterial 129 (L) 136 - 145 mmol/L    POCT Potassium, Arterial 5.0 3.5 - 5.3 mmol/L    POCT Chloride, Arterial 101 98 - 107 mmol/L    POCT Ionized Calcium, Arterial 1.20 1.10 - 1.33 mmol/L    POCT Glucose, Arterial 110 (H) 74 - 99 mg/dL    POCT Lactate, Arterial 1.8 0.4 - 2.0 mmol/L    POCT Base Excess, Arterial -3.4 (L) -2.0 - 3.0 mmol/L    POCT HCO3 Calculated, Arterial 23.1 22.0 - 26.0 mmol/L    POCT Hemoglobin, Arterial 11.1 (L) 13.5 - 17.5 g/dL    POCT Anion Gap, Arterial 10 10 - 25 mmo/L    Patient Temperature 37.0 degrees Celsius    FiO2 28 %   Blood Culture    Specimen: Peripheral Venipuncture; Blood culture   Result Value Ref Range    Blood Culture Loaded on Instrument - Culture in progress    POCT GLUCOSE   Result Value Ref Range    POCT Glucose 106 (H) 74 - 99 mg/dL   BLOOD GAS ARTERIAL FULL PANEL   Result Value Ref Range    POCT pH, Arterial 7.30 (L) 7.38 - 7.42 pH    POCT pCO2, Arterial 46 (H) 38 - 42 mm Hg    POCT pO2, Arterial 84 (L) 85 - 95 mm Hg    POCT SO2, Arterial 95 94 - 100 %    POCT Oxy Hemoglobin, Arterial 94.5 94.0 - 98.0 %    POCT Hematocrit Calculated, Arterial 30.0 (L) 41.0 - 52.0 %    POCT Sodium, Arterial 129 (L) 136 - 145 mmol/L    POCT Potassium, Arterial 4.7 3.5 - 5.3 mmol/L    POCT Chloride, Arterial      POCT Ionized Calcium, Arterial 1.20 1.10 - 1.33  mmol/L    POCT Glucose, Arterial 100 (H) 74 - 99 mg/dL    POCT Lactate, Arterial 2.4 (H) 0.4 - 2.0 mmol/L    POCT Base Excess, Arterial -3.8 (L) -2.0 - 3.0 mmol/L    POCT HCO3 Calculated, Arterial 22.6 22.0 - 26.0 mmol/L    POCT Hemoglobin, Arterial 10.0 (L) 13.5 - 17.5 g/dL    POCT Anion Gap, Arterial      Patient Temperature 37.0 degrees Celsius    FiO2 28 %   Prepare RBC: 1 Units   Result Value Ref Range    PRODUCT CODE P4107T61     Unit Number [id number]     Unit ABO O     Unit RH POS     XM INTEP COMP     Dispense Status XM     Blood Expiration Date 3/1/2025 11:59:00 PM EST     PRODUCT BLOOD TYPE 5100     UNIT VOLUME 350    Blood Gas Arterial Full Panel   Result Value Ref Range    POCT pH, Arterial 7.30 (L) 7.38 - 7.42 pH    POCT pCO2, Arterial 48 (H) 38 - 42 mm Hg    POCT pO2, Arterial 86 85 - 95 mm Hg    POCT SO2, Arterial 97 94 - 100 %    POCT Oxy Hemoglobin, Arterial 95.4 94.0 - 98.0 %    POCT Hematocrit Calculated, Arterial 29.0 (L) 41.0 - 52.0 %    POCT Sodium, Arterial 129 (L) 136 - 145 mmol/L    POCT Potassium, Arterial 4.5 3.5 - 5.3 mmol/L    POCT Chloride, Arterial 104 98 - 107 mmol/L    POCT Ionized Calcium, Arterial 1.17 1.10 - 1.33 mmol/L    POCT Glucose, Arterial 86 74 - 99 mg/dL    POCT Lactate, Arterial 1.3 0.4 - 2.0 mmol/L    POCT Base Excess, Arterial -2.9 (L) -2.0 - 3.0 mmol/L    POCT HCO3 Calculated, Arterial 23.6 22.0 - 26.0 mmol/L    POCT Hemoglobin, Arterial 9.8 (L) 13.5 - 17.5 g/dL    POCT Anion Gap, Arterial 6 (L) 10 - 25 mmo/L    Patient Temperature 37.0 degrees Celsius    FiO2 28 %               ASSESSMENT & PLAN  Mickey Dakin is a 79 y.o. male with a history of remote CVA, infrarenal AAA, severe mvCAD, moderate AS and AI, severe COPD (FEV1 0.59 24%). The patient was recently discussed at the CHIP meeting for his coronary artery disease and decision was made for complex PCI of LAD, LCx, and RCA given heavily calcified ascending aorta precluding CABG and high-risk comorbidities  precluding MIDCAB.      On 2/11/25 the patient underwent an elective complex PCI. During complex PCI procedure, stent was deployed and team immediately noted hypotension down to 50s SBP requiring levophed, T wave inversion, and blush near distal LM on cath imaging. Pt c/o chest pain. Suspicion for dissection causing by Lcx stent deployment abutting calcific lesion and causing retrograde LM dissection. Cath team was able to deploy stent to cover the lesion and all sxs resolved.      Immediately upon arrival to ICU, patient continued to complain of 3-4 midsternal chest pain and was exhibiting hypotension to 70-80s SBP. Fluids started. Bedside and formal echos performed which demonstrated no evidence of tamponade per verbal report. CT with residual contrast tracking along aortic root/sinus, small pericardial effusion.      Patient transferred to [facility] [place] for continuity of care.  Cardiac surgery consulted for surgical evaluation.     RECOMMENDATIONS/PLAN  Dr. Steiner well aware of patient, has seen him as outpatient, and reviewed current imaging and data.   Overall, the patient is a very poor surgical candidate given his numerous medical/physicial co-morbidities including severe COPD (FEV1 24% predicted), 5.3cm AAA, severely calcified ascending aorta and overall frailty.  He was deemed not a surgical candidate and recommended PCI during CHIP discussion.     Cardiac surgery is now consulted for mediastinal hematoma in the setting of a complex PCI s/p LCX stent that caused LM dissection s/p LM PCI.     Given the patient is hemodynamically stable, TTE findings of stable small pericardial effusion and no signs of tamponade, and repeat CTA 2/12 shows improved mediastinal hematoma, no contrast extravasation, and the small pericardial effusion is stable, no plan for surgical intervention.     Cardiac surgery will sign off.   Please page the cardiac surgery consult pager [phone] with any questions or changes in patient  condition.    Katherin Higginbotham PA-C  Cardiac Surgery Consult [facility]  [facility]  Cardiac Surgery Consult Pager [phone]     2/12/2025  3:10 PM

## 2025-02-12 NOTE — PROGRESS NOTES
"CARDIAC ICU PROGRESS NOTE    Mickey Dakin/[id number]    Admit Date: 2/11/2025  Hospital Length of Stay: 1   ICU Length of Stay: 16h     Subjective   Upon evaluation this AM, does not report pain in chest or anywhere else. No SOB         Objective    VITALS:   Visit Vitals  BP 85/52   Pulse 92   Temp 36.3 °C (97.3 °F) (Temporal)   Resp 19   Ht 1.676 m (5' 6\")   Wt 57.5 kg (126 lb 12.2 oz)   SpO2 (!) 87%   BMI 20.46 kg/m²   Smoking Status Every Day   BSA 1.64 m²       Vent settings:    Most Recent Range Past 24hrs   Mode      FiO2   No data recorded   Rate   No data recorded   Vt    No data recorded   PEEP   No data recorded     Invasive Hemodynamics:    Most Recent Range Past 24hrs   BP (Art) 83/49 Arterial Line BP 1  Min: 72/51  Max: 105/63   MAP(Art) 61 mmHg Arterial Line MAP 1 (mmHg)   Min: 59 mmHg  Max: 80 mmHg   RA/CVP   No data recorded   PA   No data recorded   PA(mean)   No data recorded   PCWP   No data recorded   CO   No data recorded   CI   No data recorded   Mixed Venous   No data recorded   SVR    No data recorded   PVR   No data recorded     Infusions:  norepinephrine, Last Rate: 0.1 mcg/kg/min (02/12/25 0607)        INTAKE/OUTPUT:  I/O last 3 completed shifts:  In: 3705.2 (64.4 mL/kg) [P.O.:240; I.V.:166.6 (2.9 mL/kg); Blood:633; IV Piggyback:2665.6]  Out: 600 (10.4 mL/kg) [Urine:600 (0.3 mL/kg/hr)]  Weight: 57.5 kg      Wt Readings from Last 5 Encounters:   02/12/25 57.5 kg (126 lb 12.2 oz)   02/11/25 55.8 kg (122 lb 15.9 oz)   12/11/24 55.8 kg (123 lb)   12/05/24 56.7 kg (125 lb)   10/25/24 56.3 kg (124 lb 3.2 oz)       LABS:  CBC:  Recent Labs     02/12/25  0100 02/11/25  1822 02/11/25  1509 02/03/25  1207   WBC 19.9* 17.8* 18.0* 9.2   HGB 11.3* 10.4* 11.6* 14.8   HCT 31.6* 30.4* 32.8* 44.3    264 316 327   MCV 91 99 95 100.5*     CMP:  Recent Labs     02/12/25  0100 02/11/25  1509 02/03/25  1207 01/31/25  0810 12/05/24  0922   * 134* 135 136 135*   K 5.0 4.7 4.9 4.9 4.8    101 98 " "101 99   CO2 25 25 28 29 31   ANIONGAP 12 13 9 6* 10   BUN 17 11 8 8 9   CREATININE 0.72 0.61 0.53* 0.64* 0.55   EGFR >90 >90 102 96 >90   MG 1.77  --   --   --   --      Recent Labs     02/12/25  0100 02/11/25  1509   ALBUMIN 3.4 3.4   ALT 1,530* 17   AST 1,105* 21   BILITOT 0.8 0.6     COAG:   Recent Labs     02/11/25  1509 02/03/25  1207   INR 1.1 1.0     ABO:   Recent Labs     02/11/25  1842   ABO O     HEME/ENDO:No results for input(s): \"FERRITIN\", \"IRONSAT\", \"TSH\", \"HGBA1C\" in the last 24056 hours.   CARDIAC: No results for input(s): \"LDH\", \"CKMB\", \"TROPHS\", \"BNP\" in the last 61167 hours.    No lab exists for component: \"CK\", \"CKMBP\"  Recent Labs     02/12/25  0603 02/12/25  0100 02/11/25  2206 02/11/25 2018 02/11/25  1822   LACTATEART 1.9 1.6 2.3* 2.8* 3.0*     No results for input(s): \"TACROLIMUS\", \"SIROLIMUS\", \"CYCLOSPORINE\" in the last 75994 hours.    Results from last 7 days   Lab Units 02/12/25  0100 02/11/25  1822 02/11/25  1509   WBC AUTO x10*3/uL 19.9* 17.8* 18.0*   HEMOGLOBIN g/dL 11.3* 10.4* 11.6*   HEMATOCRIT % 31.6* 30.4* 32.8*   PLATELETS AUTO x10*3/uL 224 264 316     Results from last 7 days   Lab Units 02/12/25  0100 02/11/25  1509   SODIUM mmol/L 133* 134*   POTASSIUM mmol/L 5.0 4.7   CO2 mmol/L 25 25   ANION GAP mmol/L 12 13   BUN mg/dL 17 11   CREATININE mg/dL 0.72 0.61   GLUCOSE mg/dL 142* 125*   EGFR mL/min/1.73m*2 >90 >90   MAGNESIUM mg/dL 1.77  --    PHOSPHORUS mg/dL 5.4*  --       Results from last 7 days   Lab Units 02/12/25  0100 02/11/25  1509   ALT U/L 1,530* 17   AST U/L 1,105* 21   ALK PHOS U/L 68 68      Results from last 7 days   Lab Units 02/11/25  1509   INR  1.1     Results from last 7 days   Lab Units 02/12/25  0603 02/12/25  0100 02/11/25  2206   POCT PH, ARTERIAL pH 7.29* 7.28* 7.28*   POCT PO2, ARTERIAL mm Hg 77* 92 106*   POCT PCO2, ARTERIAL mm Hg 46* 51* 43*   FIO2 % 21 21 21     Results from last 7 days   Lab Units 02/11/25  1509   POCT PH, VENOUS pH 7.21*   POCT " "PCO2, VENOUS mm Hg 63*     Results from last 7 days   Lab Units 02/12/25  0100 02/11/25  1822 02/11/25  1509   LACTATE mmol/L 2.0 3.6* 2.4*               No results found for: \"CKTOTAL\", \"CKMB\", \"CKMBINDEX\", \"TROPONINI\"   No results found for: \"HGBA1C\"   No results found for: \"CHOL\"  No results found for: \"HDL\"  No results found for: \"LDLCALC\"  No results found for: \"TRIG\"  No components found for: \"CHOLHDL\"     IMAGING:   Cardiac Catheterization Procedure    Result Date: 2/11/2025   [facility], Cath Lab, [address] [place] [address] Cardiovascular Catheterization Report Patient Name:      MICKEY DAKIN         Performing Physician:  [id number] Carl Gillombardo MD Study Date:        2/11/2025            Verifying Physician:   [id number] Carl Gillombardo MD MRN/PID:           [id number]             Cardiologist/Co-Scrub: Accession#:        [id number]         Ordering Provider:     [id number] CARL B GILLOMBARDO Date of Birth/Age: 1945 / 79 years Cardiologist: Gender:            M                    Fellow:                [id number] Juan Orozco MD Encounter#:        [id number]           Surgeon:  Study:            PCI - Percutaneous Coronary Intervention Additional Study: FFR - Fractional Flow Reserve Additional Study: OCT - Optical Coherence Tomography Additional Study: Left Heart Cath  Indications: MICKEY DAKIN is a 79 year old male who presents with dyslipidemia, hypertension, chronic pulmonary disease and Tobacco Use - Current, Angina. MICKEY DAKIN is a 79 year old male who presents with coronary artery disease, aortic stenosis, chronic pulmonary disease, dyslipidemia, hypertension and a chest pain assessment of typical angina Angina. Worsening angina.  " Appropriate Use Criteria: Unstable angina with intermediate risk score; AUC score = 8. Medical History: Stress test performed: No. CTA performed: No. [place] accessed: No. LVEF Assessed: Yes. LVEF = 55%. Cardiac arrest: No. Cardiac surgical consult: Completed - cardiac surgery not recommended. Cardiovascular instability: No. Frailty status of patient entering lab: 6 = Moderately frail.  Procedure Description: After infiltration with 1% Lidocaine, the right radial artery was cannulated with a modified Seldinger technique. Subsequently a 6 Turkmen sheath was placed retrograde in the right radial artery. Additional catheter(s) used to visualize the coronary arteries were: EBU 3.5. Multiple injections of contrast were made into the left coronary arteries with angiograms recorded in multiple projections. After completion of the procedure, the arterial sheath was pulled and a TR Band Radial Compression Device was utilized to obtain patent hemostasis.  Coronary Angiography: The coronary circulation is right dominant.  Left Main Coronary Artery: The left main coronary artery is a normal caliber vessel. The left main arises normally from the left coronary sinus of Valsalva and bifurcates into the LAD and circumflex coronary arteries. The left main coronary artery showed moderate atherosclerotic disease and calcification. The mid to distal left main coronary artery showed 30% stenosis.  Left Anterior Descending Coronary Artery Distribution: The left anterior descending coronary artery is a normal caliber vessel. The LAD arises normally from the left main coronary artery. The LAD demonstrated moderate atherosclerotic disease and calcification. The mid left anterior descending coronary artery showed 60% stenosis.  Circumflex Coronary Artery Distribution: The circumflex coronary artery is a normal caliber vessel. The circumflex arises normally from the left main coronary artery and terminates in the AV groove. The circumflex  revealed moderate ostial atherosclerotic disease and severe calcification. The ostial circumflex coronary artery showed 70% stenosis. An additional lesion, located at the proximal circumflex coronary artery, demonstrated 90% stenosis. A third circumflex lesion, located at the distal circumflex coronary artery revealed 70% stenosis. The 1st obtuse marginal branch showed mild atherosclerotic disease. The entire 1st obtuse marginal branch showed 30% stenosis.  Right Coronary Artery Distribution: The right coronary artery could not be successfully injected.  Coronary Interventions: Angiography reveals a 90% stenosis of the ostial and proximal circumflex coronary artery. Pre-intervention KATE flow was 3. Percutaneous coronary intervention was performed within the ostial and proximal circumflex. The vessel was pre-dilated using a non-compliant balloon 2.0 mm x 15 mm at 20 AN. [place] 2.25 mm x 30 mm was advanced to the lesion and implanted at 12 AN. A second [place] 3.0 mm x 8 mm was implanted in overlap at 11 AN. The stent was post dilated using a non-compliant balloon 2.5 mm x 12 mm at 14-16 AN. The stenosis was successfully reduced from 90% to 0%. Post intervention Optical Coherence Tomography (OCT) was performed within the ostial and proximal circumflex . The stent demonstrated good apposition. No edge dissection is visualized. Post-intervention KATE flow was 3. Heparin was administered and therapeutic ACT's were maintained for the entirety the procedure. We then engaged the ostium of the left main coronary artery using a 7 Malagasy EBU 3.5 guide catheter. After obtaining diagnostic cine images from multiple fluoroscopic views, we then advanced a coronary pressure wire into the left main coronary artery, disengaged our guide and equalized pressures between the pressure wire and our manifold. We then advanced the coronary pressure wire into the distal segment of the left anterior descending artery with  relative ease and obtained pressure recordings to adjudicate the intermediate lesion in the proximal to mid segment of the vessel. RFR for the LAD lesion was negative at a value of 0.92. We then advanced a coronary run-through wire into the distal segment of the left circumflex artery, after which we advanced a [place] coronary wire into the ramus intermedius with relative ease. We then predilated the lesion in the proximal segment of the left circumflex artery using a 2.0 mm by 15 mm noncompliant balloon which was serially inflated up to 20 benita. We then attempted to advance a [place] [employer] OCT imaging catheter into position, however became clear that we needed to further pretreat the calcified lesion in the proximal segment of the left circumflex artery. Informed by these findings, we then predilated the lesion using a 2.25 mm x 12 mm noncompliant balloon which was serially inflated up to 18 benita. After removing the 2.25 mm noncompliant balloon, we then advanced a 2.5 mm x 8 mm noncompliant balloon, with which we further predilated the lesion serially inflating up to 20 benita. At this point in the case we obtained OCT imaging which confirmed our suspicion regarding lesion severity, further characterize the lesion as diffusely calcific, and confirmed that we had in fact cracked the critical lesion in the proximal segment of the vessel. Satisfied with our lesion preparation, after moving the OCT imaging catheter we then advanced a 2.25 mm x 30 mm [place] frontier drug-eluting stent, which was subsequently deployed at 12 benita. The stent was quickly postdilated using the 2.5 mm noncompliant balloon.  Immediately after stent deployment, the patient complained of new severe chest discomfort, there was significant ST segment depressions noted on telemetry, and there was clear staining of contrast within the left main coronary artery extending into the root of the aorta.  The patient became hypotensive with systolic blood pressures  in the 50s, but never lost consciousness. We started low-dose norepinephrine drip which was uptitrated to 0.07 mcg, and gave multiple boluses of IV fluids totaling approximately 500 cc. 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 Informed by these findings we then attempted to seal the dissection off using a 3.0 mm x 8 mm Gurpreet frontier drug-eluting stent, which was subsequently deployed at 12 benita. We then postdilated the stent using a 3.25 mm and then 3.5 mm noncompliant balloons, which were both serially inflated up to 18 benita making sure to flare the stent in the ostium of the left main coronary artery. Stenting of the left main coronary artery immediately stabilize the patient, chest pain completely resolved, hemodynamics significantly improved to the point that norepinephrine drip was quickly down titrated and weaned off entirely, and ST segment changes on the monitor resolved. We then obtained cine images from multiple fluoroscopic views which demonstrated KATE-3 flow in all coronary vascular territories. He then removed our equipment in the usual fashion and hemostasis of the 7 Azeri right radial artery access site was achieved using a single TR band.  Coronary Lesion Summary: Vessel       Stenosis   Vessel Segment Left Main  30% stenosis mid to distal LAD        60% stenosis      mid Circumflex 70% stenosis     ostial Circumflex 90% stenosis    proximal Circumflex 70% stenosis     distal OM 1       30% stenosis     entire  Hemo Personnel: +---------------------------+---------+ Name                       Duty      +---------------------------+---------+ Gillombardo, Carl Barton MDPROC MD 1 +---------------------------+---------+  Hemodynamic Pressures:  +----+---------------------+----------+-------------+--------------+---------+ Site       Date Time      Phase NameSystolic mmHgDiastolic mmHgMean mmHg +----+---------------------+----------+-------------+--------------+---------+   AO 2/11/2025 9:09:47 AM  AIR REST          130            64       88 +----+---------------------+----------+-------------+--------------+---------+   AO 2/11/2025 9:26:33 AM  AIR REST          125            66       90 +----+---------------------+----------+-------------+--------------+---------+   AO 2/11/2025 9:55:28 AM  AIR REST          118            66       86 +----+---------------------+----------+-------------+--------------+---------+   AO2/11/2025 10:36:46 AM  AIR REST          110            58       77 +----+---------------------+----------+-------------+--------------+---------+   AO2/11/2025 11:35:30 AM  AIR REST           95            56       72 +----+---------------------+----------+-------------+--------------+---------+   AO2/11/2025 11:45:01 AM  AIR REST          116            60       80 +----+---------------------+----------+-------------+--------------+---------+   AO2/11/2025 11:46:54 AM  AIR REST          126            68       92 +----+---------------------+----------+-------------+--------------+---------+  Cardiac Cath Post Procedure Notes: Post Procedure Diagnosis: S/p RFR evluation of LAD                           s/p OCT guided PCI of LCX                           s/p OCT guided PCI of LM. Blood Loss:               Estimated blood loss during the procedure was 10 mls. Specimens Removed:        Number of specimen(s) removed: none.  Recommendations: Maximize medical therapy. Agressive risk factor modification efforts. CT surgical consultation to consider possible surgical options. Consider referral to cardiac rehabilitation. ____________________________________________________________________________________ CONCLUSIONS:  1. S/p pressure wire evaluation (RFR) of intermediate LAD lesion,  which was negative at 0.92.  2. S/p OCT guided PCI to ostioproximal LCX using a 2.25mm x 30mm josiane frontier MARIBELL, postdilated to 2.5mm.  3. Case was complicated by retrograde dissection from ostium of LCX though the left main coronary artery and into the root of the aorta, necessitating the placement of a second drug-eluting stent (3.0 mm x 8 mm Josiane frontier drug-eluting stent, postdilated to 3.5 mm) in the left main coronary artery which successfully stabilized the patient.  4. DAPT with aspirin + clopidogrel.  5. [place] [facility] [place] close monitoring and serial TTE + CTA Aorta. ICD 10 Codes: Angina pectoris, unspecified-I20.9  CPT Codes: Ultrasound guidance for vascular access-92584; Stent w angio ather Left Circumflex single major Artery branch (PCI)-46279.LC; Stent w angio ather Left Main single major Artery branch (PCI)-47247.LM; Left Heart Cath (visualization of coronaries) and LV-90245; OCT Initial Vessel (coronary native vessel or graft) during diagnostic evaluation and/or therapeutic intervention, initial vessel-47309  [id number] Carl Gillombardo MD Performing Physician Electronically signed by [id number] Carl Gillombardo MD on 2/11/2025 at 5:17:09 PM  ** Final **     Transthoracic Echo (TTE) Limited    Result Date: 2/11/2025   [facility], [address] [place] [address]                Tel [phone] and Fax [phone] TRANSTHORACIC ECHOCARDIOGRAM REPORT  Patient Name:       MICKEY DAKIN Reading Physician:    [id number] José Martinez MD Study Date:         2/11/2025           Ordering Provider:    [id number] JUAN DIEGO ROBERTSON MRN/PID:            [id number]            Fellow: Accession#:         [id number]        Nurse: Date of Birth/Age:  1945 / 79      Sonographer:          Trevor clemens                                     [facility],  RVT Gender assigned at  M                   Additional Staff: Birth: Height:             167.64 cm           Admit Date:           2/11/2025 Weight:             55.34 kg            Admission Status:     Inpatient - STAT BSA / BMI:          1.62 m2 / 19.69     Encounter#:           [id number]                     kg/m2 Blood Pressure:     149/89 mmHg         Department Location:  [facility] Study Type:    TRANSTHORACIC ECHO (TTE) LIMITED Diagnosis/ICD: Other pericardial effusion (noninflammatory)-I31.39 Indication:    pericardial effusion CPT Code:      Echo Limited-04761; Doppler Limited-77143; Color Doppler-22543 Patient History: Pertinent History: CAD, pericardial effusion. Study Detail: The following Echo studies were performed: 2D, Doppler, color flow               and M-Mode.  PHYSICIAN INTERPRETATION: Left Ventricle: Left ventricular ejection fraction is hyperdynamic, by visual estimate at 70-75%. There are no regional left ventricular wall motion abnormalities. The left ventricular cavity size is normal. Left ventricular diastolic filling was not assessed. Left Atrium: The left atrial size is normal. Right Ventricle: The right ventricle is normal in size. There is normal right ventricular global systolic function. Right Atrium: The right atrium is normal in size. Aortic Valve: The aortic valve was not well visualized. There is moderate aortic valve regurgitation. The peak instantaneous gradient of the aortic valve is 27 mmHg. The mean gradient of the aortic valve is 14 mmHg. The aortic regurgitation is incompletely characterized but appears to be at least moderate. There appears to be at least mild aortic stenosis. Mitral Valve: The mitral valve is normal in structure. There is moderate mitral annular calcification. There is trace mitral valve regurgitation. Tricuspid Valve: The tricuspid valve is structurally normal. There is trace tricuspid regurgitation. The Doppler estimated RVSP is mildly elevated at  35.7 mmHg. Pulmonic Valve: The pulmonic valve was not assessed. Pulmonic valve regurgitation was not assessed. Pericardium: Small pericardial effusion anterior to the right ventricle. Aorta: The aortic root was not well visualized. The ascending aorta was not well visualized. Systemic Veins: The inferior vena cava appears normal in size, with IVC inspiratory collapse greater than 50%. In comparison to the previous echocardiogram(s): Compared with study dated 2025, the prior study was a limited echo by the on call cardiology fellow. The pericardial effusion appears mildly smaller on the current study.  CONCLUSIONS:  1. Limited study to assess pericardial effusion.  2. Left ventricular ejection fraction is hyperdynamic, by visual estimate at 70-75%.  3. There is normal right ventricular global systolic function.  4. There is moderate mitral annular calcification.  5. Mildly elevated right ventricular systolic pressure.  6. The aortic regurgitation is incompletely characterized but appears to be at least moderate. There appears to be at least mild aortic stenosis.  7. Small pericardial effusion anterior to the right ventricle.  8. Both ventricles are collapsible and appear underfilled.  9. The inferior vena cava appears normal in size, with IVC inspiratory collapse greater than 50%. QUANTITATIVE DATA SUMMARY:  2D MEASUREMENTS:          Normal Ranges: LVEDV Index:     23 ml/m2  LV SYSTOLIC FUNCTION:                      Normal Ranges: EF-A4C View:    83 % (>=55%) EF-A2C View:    76 % EF-Biplane:     80 % EF-Visual:      73 % LV EF Reported: 73 %  AORTIC VALVE:           Normal Ranges: AoV Vmax:     2.60 m/s  (<=1.7m/s) AoV Peak P.0 mmHg (<20mmHg) AoV Mean P.0 mmHg (1.7-11.5mmHg) AoV VTI:      36.70 cm  (18-25cm)  TRICUSPID VALVE/RVSP:          Normal Ranges: Peak TR Velocity:     2.86 m/s RV Syst Pressure:     36 mmHg  (< 30mmHg) IVC Diam:             1.80 cm  [id number] José Martinez MD Electronically  signed on 2/11/2025 at 4:20:17 PM  ** Final **     Electrocardiogram 12 Lead    Result Date: 2/11/2025  Sinus tachycardia Possible Anterior infarct (cited on or before 29-NOV-2024) Abnormal ECG When compared with ECG of 29-NOV-2024 12:23, No significant change was found    Transthoracic Echo (TTE) Complete    Result Date: 2/11/2025   [facility], [address] [place] [address]              Tel [phone] and Fax [phone] TRANSTHORACIC ECHOCARDIOGRAM REPORT  Patient Name:       MICKEY DAKIN        Reading Physician:    [id number] Anoop Mckinley DO Study Date:         2/11/2025           Ordering Provider:    [id number] RAYMON CONTRERAS MRN/PID:            [id number]            Fellow: Accession#:         [id number]        Nurse: Date of Birth/Age:  1945 / 79      Sonographer:          Saul clemens                                     RDWANDA Gender assigned at  M                   Additional Staff: Birth: Height:             167.00 cm           Admit Date:           2/11/2025 Weight:             55.00 kg            Admission Status:     Inpatient -                                                               Critical/Stat                                                               (within 1-3                                                               hours) BSA / BMI:          1.61 m2 / 19.72     Encounter#:           [id number]                     kg/m2 Blood Pressure:     119/105 mmHg        Department Location:  [facility] Study Type:    TRANSTHORACIC ECHO (TTE) COMPLETE Diagnosis/ICD: Other pericardial effusion (noninflammatory)-I31.39 Indication:    Status post insertion of drug eluting coronary artery stent CPT Code:      Echo Complete w Full Doppler-65639 Patient History: Pertinent History: CAD. pericardial effusion. Study Detail: The  following Echo studies were performed: 2D, M-Mode, Doppler and               color flow. Technically challenging study due to body habitus,               poor acoustic windows and sitting upright.  PHYSICIAN INTERPRETATION: Left Ventricle: Left ventricular ejection fraction is low normal, calculated by Geiger's biplane at 53%. There are no regional left ventricular wall motion abnormalities. The left ventricular cavity size is normal. There is normal septal and normal posterior left ventricular wall thickness. There is left ventricular concentric remodeling. Spectral Doppler shows a Grade I (impaired relaxation pattern) of left ventricular diastolic filling with normal left atrial filling pressure. Left Atrium: The left atrial size is normal. Right Ventricle: The right ventricle is normal in size. There is normal right ventricular global systolic function. Right Atrium: The right atrium is normal in size. Aortic Valve: The aortic valve was not well visualized. There is evidence of mild to moderate aortic valve stenosis. The aortic valve dimensionless index is 0.46. The peak aortic velocity was obtained from the apical view. There is mild to moderate aortic valve regurgitation. The peak instantaneous gradient of the aortic valve is 34 mmHg. The mean gradient of the aortic valve is 17 mmHg. Mitral Valve: The mitral valve is normal in structure. There is mild to moderate mitral annular calcification. There is trace to mild mitral valve regurgitation. Tricuspid Valve: The tricuspid valve is structurally normal. There is trace tricuspid regurgitation. The Doppler estimated RVSP is within normal limits at 19.5 mmHg. Pulmonic Valve: The pulmonic valve is not well visualized. The pulmonic valve regurgitation was not well visualized. Pericardium: Small pericardial effusion localized near the right ventricle. There is no evidence of cardiac tamponade. Aorta: The aortic root is normal. There is no dilatation of the ascending  aorta. There is no dilatation of the aortic root. Systemic Veins: The inferior vena cava appears normal in size, with IVC inspiratory collapse greater than 50%. In comparison to the previous echocardiogram(s): Compared with study dated 2/11/2025,. Compared to limited study obtained earlier today, the effusion has expanded and is more RV localizied. The effusion is at least small but without chamber collapse or inflow variation to suggest tamponade at this time. The IVC is normal at 1.8 cm and collapses > 50%. Patient however is tachycardic.  CONCLUSIONS:  1. Left ventricular ejection fraction is low normal, calculated by Geiger's biplane at 53%.  2. Spectral Doppler shows a Grade I (impaired relaxation pattern) of left ventricular diastolic filling with normal left atrial filling pressure.  3. There is normal right ventricular global systolic function.  4. Right ventricular systolic pressure is within normal limits.  5. Mild to moderate aortic valve stenosis.  6. Mild to moderate aortic valve regurgitation.  7. There is no evidence of cardiac tamponade.  8. Compared to limited study obtained earlier today, the effusion has expanded and is more RV localizied. The effusion is at least small but without chamber collapse or inflow variation to suggest tamponade at this time. The IVC is normal at 1.8 cm and collapses > 50%. Patient however is tachycardic. QUANTITATIVE DATA SUMMARY:  2D MEASUREMENTS:          Normal Ranges: IVSd:            0.92 cm  (0.6-1.1cm) LVPWd:           0.90 cm  (0.6-1.1cm) LVIDd:           3.61 cm  (3.9-5.9cm) LVIDs:           2.33 cm LV Mass Index:   58 g/m2 LVEDV Index:     32 ml/m2 LV % FS          35.2 %  LEFT ATRIUM:                  Normal Ranges: LA Vol A4C:        30.1 ml    (22+/-6mL/m2) LA Vol A2C:        33.7 ml LA Vol BP:         34.6 ml LA Vol Index A4C:  18.6ml/m2 LA Vol Index A2C:  20.9 ml/m2 LA Vol Index BP:   21.5 ml/m2 LA Area A4C:       13.3 cm2 LA Area A2C:       15.3 cm2 LA  Major Axis A4C: 5.0 cm LA Major Axis A2C: 5.9 cm LA Volume Index:   21.5 ml/m2 LA Vol A4C:        27.8 ml LA Vol A2C:        31.3 ml LA Vol Index BSA:  18.3 ml/m2  RIGHT ATRIUM:         Normal Ranges: RA Area A4C:  8.6 cm2  AORTA MEASUREMENTS:         Normal Ranges: Ao Sinus, d:        3.20 cm (2.1-3.5cm) Ao STJ, d:          2.70 cm (1.7-3.4cm) Asc Ao, d:          3.00 cm (2.1-3.4cm)  LV SYSTOLIC FUNCTION:                      Normal Ranges: EF-A4C View:    54 % (>=55%) EF-A2C View:    61 % EF-Biplane:     53 % LV EF Reported: 53 %  LV DIASTOLIC FUNCTION:          Normal Ranges: MV Peak E:             0.51 m/s (0.7-1.2 m/s) MV Peak A:             1.21 m/s (0.42-0.7 m/s) E/A Ratio:             0.42     (1.0-2.2)  MITRAL VALVE:         Normal Ranges: MV DT:        89 msec (150-240msec)  AORTIC VALVE:                      Normal Ranges: AoV Vmax:                2.90 m/s  (<=1.7m/s) AoV Peak P.6 mmHg (<20mmHg) AoV Mean P.5 mmHg (1.7-11.5mmHg) LVOT Max Silvio:            1.20 m/s  (<=1.1m/s) AoV VTI:                 39.71 cm  (18-25cm) LVOT VTI:                18.42 cm LVOT Diameter:           2.00 cm   (1.8-2.4cm) AoV Area, VTI:           1.45 cm2  (2.5-5.5cm2) AoV Area,Vmax:           1.29 cm2  (2.5-4.5cm2) AoV Dimensionless Index: 0.46  AORTIC INSUFFICIENCY: AI Vmax:       3.39 m/s AI Half-time:  536 msec AI Decel Time: 1847 msec AI Decel Rate: 183.52 cm/s2  RIGHT VENTRICLE: RV Basal 2.20 cm RV Mid   1.20 cm RV Major 7.0 cm TAPSE:   15.6 mm  TRICUSPID VALVE/RVSP:          Normal Ranges: Peak TR Velocity:     2.03 m/s Est. RA Pressure:     3 mmHg RV Syst Pressure:     20 mmHg  (< 30mmHg) IVC Diam:             1.80 cm  PULMONIC VALVE:          Normal Ranges: PV Accel Time:  133 msec (>120ms) PV Max Silvio:     1.4 m/s  (0.6-0.9m/s) PV Max P.4 mmHg  [id number] Anoop Mckinley DO Electronically signed on 2025 at 3:17:02 PM  ** Final **     CT angio chest w and wo IV contrast    Result  Date: 2/11/2025  Interpreted By:  Eligio Donahue, STUDY: CT ANGIO CHEST W AND WO IV CONTRAST;  2/11/2025 12:38 pm   INDICATION: Signs/Symptoms:LM dissection vs perforation s/p stenting.     COMPARISON: 02/03/2025.   ACCESSION NUMBER(S): [id number]   ORDERING CLINICIAN: AKIN DENNIS   TECHNIQUE: Using multi-detector CT technology, axial, sequential imaging with prospective gating was performed of the chest following the intravenous administration of 100 ML Omnipaque 350.   For optimization of anatomic evaluation, multiplanar reconstruction, maximum intensity projections, and advanced 3-D off-line postprocessing were performed on a dedicated stand-alone workstation by the interpreting physician.   FINDINGS: Potential study limitations:  None.   THORACIC AORTA: There is extensive atherosclerotic calcification of the thoracic aorta. There is interval extensive intramural hematoma consistent with acute dissecting intramural hematoma.. There is contrast extravasation at the level of the aortic annulus/left main coronary artery. The patient is status post left main coronary artery stenting. There is extensive calcification of the native coronary arteries. The sinotubular junction is  preserved. . The arch vessel branching pattern is  conventional.  All of the arch branch vessels appear widely patent in their proximal portions. Visualized proximal abdominal aorta is normal. The celiac trunk, SMA and bilateral renal arteries are normal in caliber.   REPRESENTATIVE MEASUREMENTS OF THE AORTA: Mid ascending thoracic aorta: 3.6 x 3.5 cm. Mid descending thoracic aorta: 2.2 by 2.4 cm.   CORONARY ARTERIES: The examination is not optimized for assessment of the coronary arteries. The patient is status post left main coronary artery stenting. There is extensive calcification within the native coronary arteries.     PULMONARY ARTERIES: The central pulmonary arteries compressed. There is diffuse adventitial fluid/hemorrhage.    SYSTEMIC AND PULMONARY VEINS: Normal systemic venous and pulmonary venous return. The SVC and IVC are of normal caliber. Normal pulmonary venous anatomy.   CARDIAC CHAMBERS: Normal atrioventricular and ventriculoarterial concordance.   LEFT ATRIUM: Normal size there is left atrial compression by mediastinal hematoma.   RIGHT ATRIUM: Normal size   INTERATRIAL SEPTUM: Intact.   LEFT VENTRICLE: Left ventricular hypertrophy.   RIGHT VENTRICLE: Normal size (MYAH-  cm)   INTERVENTRICULAR SEPTUM: Intact.   AORTIC VALVE: The aortic valve is  trileaflet in morphology. Mild thickening and calcifications.. Findings suggest acute intramural hematoma with exit site at the posterior commissure of the aortic valve..   MITRAL VALVE: Mild mitral annular calcifications.   PERICARDIUM: There is high attenuation pericardial fluid/hemorrhage.   CHEST: Trachea and central airways are patent. No endobronchial lesion. There is no focal pulmonary mass or consolidation. No pleural effusion or pneumothorax. There is severe emphysematous changes. There are right middle lobe parenchymal airspace opacities. There is extensive infiltrative fluid/hematoma throughout the mediastinum. There is diffuse esophageal thickening with high attenuation within the esophageal wall. Visualized thyroid is intact.   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 It measures 5.2 x 5.4 cm.     BONE AND SOFT TISSUE: No suspicious osseous abnormality.       1.  There is acute mediastinal hemorrhage with intramural hematoma of the ascending thoracic aorta. 2. Findings suggest acute intramural hematoma at the proximal left main coronary artery stent. There is a exit site/contrast extravasation into the mediastinum at the level of the posterior commissure of the aortic valve. 3. Diffuse mediastinal hemorrhage surrounds the esophagus and a component of esophageal intramural hematoma can not be excluded. Mediastinal hemorrhage with left  atrial compression. 4. Is hemorrhagic pericardial fluid. 5. There is severe emphysematous changes with right middle lobe opacities and correlate with a component of developing infiltrate/aspiration. 6. These findings were discussed with the patient's caregivers at 1:30 p.m. on 02/11/2025.     MACRO: None   Signed by: Eligio Donahue 2/11/2025 1:49 PM Dictation workstation:   [id number]    Transthoracic Echo (TTE) Limited    Result Date: 2/11/2025   [facility] [place] [facility]– [place], [address] [place] [address]              Tel [phone] and Fax [phone] TRANSTHORACIC ECHOCARDIOGRAM REPORT  Patient Name:       STEPHAN MARTYIN        Reading Physician:    [id number] Perera MD Study Date:         2/11/2025           Ordering Provider:    [id number] CONTRERAS MRN/PID:            [id number]            Fellow: Accession#:         [id number]        Nurse: Date of Birth/Age:  1945 / 79      Sonographer:          Jackie clemens                                     WANDA Gender assigned at                     Additional Staff: Birth: Height:                                 Admit Date:           2/11/2025 Weight:                                 Admission Status:     Inpatient -                                                               Routine BSA / BMI:          m2 / kg/m2          Encounter#:           [id number] Blood Pressure:     /                   Department Location:  [facility] Non                                                               Invasive Study Type:    TRANSTHORACIC ECHO (TTE) LIMITED Diagnosis/ICD: Other pericardial effusion (noninflammatory)-I31.39 Indication:    supected coronary perforation r/o pericardial efusion CPT Code:      Echo Limited-85474; Doppler Limited-37883; Color Doppler-03347 Patient History: Pertinent History: CAD.  Study Detail: The following Echo studies were performed: 2D, M-Mode, Doppler and               color flow.  PHYSICIAN INTERPRETATION: Left Ventricle: The left ventricle was not well visualized. The left ventricular ejection fraction could not be measured. The left ventricular cavity size was not assessed. Left ventricular diastolic filling was not assessed. Left Atrium: The left atrial size was not assessed. Right Ventricle: The right ventricle was not assessed. There is normal right ventricular global systolic function. Right Atrium: The right atrial size was not assessed. Aortic Valve: The aortic valve was not assessed. There is indeterminate aortic valve regurgitation. Mitral Valve: The mitral valve was not well visualized. There is trace mitral valve regurgitation. Tricuspid Valve: The tricuspid valve was not well visualized. Tricuspid regurgitation was not assessed. Pulmonic Valve: The pulmonic valve was not assessed. Pulmonic valve regurgitation was not assessed. Pericardium: Trivial pericardial effusion. Aorta: The aortic root was not assessed. In comparison to the previous echocardiogram(s): Compared with study dated 1/16/2025, the pericardial effusion is now trivial (only seen in systole) compared to none on prior study.  CONCLUSIONS:  1. The left ventricle was not well visualized. The left ventricular ejection fraction could not be measured.  2. There is normal right ventricular global systolic function.  3. Trivial pericardial effusion.  4. Compared with study dated 1/16/2025, the pericardial effusion is now trivial (only seen in systole) compared to none on prior study. QUANTITATIVE DATA SUMMARY:  [id number] Romie Perera MD Electronically signed on 2/11/2025 at 12:32:42 PM  ** Final **       PHYSICAL EXAM:  Constitutional: No acute distress, cooperative   HEENT: No conjunctival icterus, EOMI grossly intact   Cardiovascular: RRR, normal S1/S2, no murmurs noted  Pulmonary: Clear to auscultation b/l, no  wheezes/crackles/rhonchi, no increased work of breathing on exam  GI: Soft, non-tender, non-distended, normoactive bowel sounds  Lower extremities: Warm and well perfused, no lower extremity edema  Neuro: A&O x3, able to move all 4 extremities spontaneously  Psych: Appropriate mood and affect       MEDICATIONS:   Scheduled medications  [Held by provider] amLODIPine, 10 mg, oral, Daily  aspirin, 81 mg, oral, Daily  clopidogrel, 75 mg, oral, Daily  ezetimibe, 10 mg, oral, Daily  insulin lispro, 0-5 Units, subcutaneous, TID AC  ipratropium-albuteroL, 3 mL, nebulization, q6h  perflutren lipid microspheres, 0.5-10 mL of dilution, intravenous, Once in imaging  perflutren protein A microsphere, 0.5 mL, intravenous, Once in imaging  rosuvastatin, 40 mg, oral, Nightly  sulfur hexafluoride microsphr, 2 mL, intravenous, Once in imaging  [Held by provider] valsartan, 160 mg, oral, Daily        Continuous medications  norepinephrine, 0.01-1 mcg/kg/min, Last Rate: 0.1 mcg/kg/min (02/12/25 0607)        PRN medications  PRN medications: acetaminophen **OR** acetaminophen **OR** acetaminophen, dextrose, dextrose, glucagon, glucagon, ondansetron, oxygen          Assessment/Plan   79 y.o. male presented today to [facility] for elective complex PCI procedure for MVD 2/11/25.   PMHx infrarenal AAA, severe MVD, moderate aortic stenosis and regurgitation, severe COPD.  Had high risk PCI with Lcx stent inserted complicated by dissection, then complicated by retrograde leak and mediastinal bleed.   Admitted for observation. High risk for CABG.  In case of tamponade, he will require pericardial window .  Dr Steiner on board. Saw the patient upon arrival.     Notably, pt was discussed at CHIP meeting and recommended for PCI of LAD, LCx, and RCA given heavily calcified ascending aorta precluding clamping for CABG and high risk comorbidities precluding CABG.   . During complex PCI procedure, stent was deployed and team immediately noted hypotension  down to 50s SBP requiring levophed, T wave inversion, and blush near distal LM on cath imaging.   Pt c/o chest pain. Suspicion for dissection causing by Lcx stent deployment abutting calcific lesion and causing retrograde LM dissection. Cath team was able to deploy stent to cover the lesion and all sxs resolved.   Immediately upon arrival to ICU, patient continued to complain of 3-4 midsternal chest pain and was exhibiting hypotension to 70-80s SBP. Fluids started. Bedside and formal echos performed which demonstrated no evidence of tamponade per verbal report.   [facility] for [facility] of care.       NEURO:   # Hx remote CVA  - Serial neuro and pain assessments  - Analgesia: PRN Tylenol     CV:   # Severe MVD s/p complex PCI to LAD, Lcx, RCA with LM dissection now s/p stenting  # Infrarenal MVD  # Moderate aortic stenosis and regurgitation - stable per intracath TTE  # Ascending aortic calcification  # HTN  -2/11- received 1 L bolus, 1 unit RBC, 1 unit plt, 1 unit cryo   -aspirin and plavix post stent  -rosuvastatin and zetia  - Volume resuscitate as needed  - CT surgery aware  - Repeat CT ECG-gated 2/12 afternoon for stability of Lcx dissection and mediastinal hemorrhage      #hemorrhagic shock  -plan as above  -levophed gtt for systolic above 75 mm Hg, will check lactate to ensure not rising  -lactate has normalized      PULM:   # Severe COPD, current smoker  - Wean FiO2 to SpO2 > 92%     GI:   - CLD: ADAT  - GI ppx: pantoprazole     /Renal:   Preserved renal function at baseline  Did receive contrast during cath and subsequently for CTA chest with ECG-gating  - Trend RFP, replete lytes per protocol  - Strict I&Os     HEME:   - CBC daily  - DVTppx: SCDs. No chemoppx d/t recent cath     ENDO:   - POCT BG, ISS q4AC/HS  - Maintain BG < 180. Hypoglycemia protocol     ID:   - no evidence of infection right now  - leukocytosis, may be reactive, will send for urine and blood cultures     F: PRN   E:  PRN   N: Regular diet   Access/Tubes: PIV  Antimicrobials: none      DVT prophylaxis: Scds, on DAPT after stenting  GI prophylaxis: PPI oral     Bowel Reg: Miralax daily  Oxygen: NC 2 l/min  Code status: Full code   NOK: Dakin,Sally (Spouse)  [phone] (Mobile)        Code Status: Full Code (confirmed on admission)   NOK: Extended Emergency Contact Information  Primary Emergency Contact: Dakin,Sally  Address: [address] [place] [address] [address] [place] [address]  Mobile Phone: [phone]  Relation: Spouse  Secondary Emergency Contact: Sandra Martins  Address: [address] [place] [id number]  Mobile Phone: [phone]  Relation: Daughter        Raymundo Ranjeet TORRES  Internal Medicine, PGY-2

## 2025-02-13 LAB
ALBUMIN SERPL BCP-MCNC: 3.3 G/DL (ref 3.4–5)
ALP SERPL-CCNC: 70 U/L (ref 33–136)
ALT SERPL W P-5'-P-CCNC: 1362 U/L (ref 10–52)
ANION GAP SERPL CALC-SCNC: 9 MMOL/L
AST SERPL W P-5'-P-CCNC: 957 U/L (ref 9–39)
ATRIAL RATE: 110 BPM
BASOPHILS # BLD AUTO: 0.03 X10*3/UL (ref 0–0.1)
BASOPHILS NFR BLD AUTO: 0.2 %
BILIRUB SERPL-MCNC: 0.7 MG/DL (ref 0–1.2)
BUN SERPL-MCNC: 21 MG/DL (ref 6–23)
CALCIUM SERPL-MCNC: 8.3 MG/DL (ref 8.6–10.6)
CHLORIDE SERPL-SCNC: 99 MMOL/L (ref 98–107)
CO2 SERPL-SCNC: 27 MMOL/L (ref 21–32)
CREAT SERPL-MCNC: 0.56 MG/DL (ref 0.5–1.3)
EGFRCR SERPLBLD CKD-EPI 2021: >90 ML/MIN/1.73M*2
EOSINOPHIL # BLD AUTO: 0.01 X10*3/UL (ref 0–0.4)
EOSINOPHIL NFR BLD AUTO: 0.1 %
ERYTHROCYTE [DISTWIDTH] IN BLOOD BY AUTOMATED COUNT: 15.2 % (ref 11.5–14.5)
GLUCOSE BLD MANUAL STRIP-MCNC: 180 MG/DL (ref 74–99)
GLUCOSE BLD MANUAL STRIP-MCNC: 91 MG/DL (ref 74–99)
GLUCOSE BLD MANUAL STRIP-MCNC: 95 MG/DL (ref 74–99)
GLUCOSE SERPL-MCNC: 92 MG/DL (ref 74–99)
HCT VFR BLD AUTO: 33.8 % (ref 41–52)
HGB BLD-MCNC: 11.7 G/DL (ref 13.5–17.5)
HOLD SPECIMEN: NORMAL
IMM GRANULOCYTES # BLD AUTO: 0.08 X10*3/UL (ref 0–0.5)
IMM GRANULOCYTES NFR BLD AUTO: 0.7 % (ref 0–0.9)
LACTATE SERPL-SCNC: 1 MMOL/L (ref 0.4–2)
LYMPHOCYTES # BLD AUTO: 1.14 X10*3/UL (ref 0.8–3)
LYMPHOCYTES NFR BLD AUTO: 9.4 %
MAGNESIUM SERPL-MCNC: 2.24 MG/DL (ref 1.6–2.4)
MCH RBC QN AUTO: 31.8 PG (ref 26–34)
MCHC RBC AUTO-ENTMCNC: 34.6 G/DL (ref 32–36)
MCV RBC AUTO: 92 FL (ref 80–100)
MONOCYTES # BLD AUTO: 0.73 X10*3/UL (ref 0.05–0.8)
MONOCYTES NFR BLD AUTO: 6 %
NEUTROPHILS # BLD AUTO: 10.13 X10*3/UL (ref 1.6–5.5)
NEUTROPHILS NFR BLD AUTO: 83.6 %
NRBC BLD-RTO: 0 /100 WBCS (ref 0–0)
P AXIS: 84 DEGREES
P OFFSET: 207 MS
P ONSET: 165 MS
PHOSPHATE SERPL-MCNC: 3.1 MG/DL (ref 2.5–4.9)
PLATELET # BLD AUTO: 116 X10*3/UL (ref 150–450)
POTASSIUM SERPL-SCNC: 4.8 MMOL/L (ref 3.5–5.3)
PR INTERVAL: 118 MS
PROT SERPL-MCNC: 4.9 G/DL (ref 6.4–8.2)
Q ONSET: 224 MS
QRS COUNT: 18 BEATS
QRS DURATION: 68 MS
QT INTERVAL: 328 MS
QTC CALCULATION(BAZETT): 443 MS
QTC FREDERICIA: 401 MS
R AXIS: 84 DEGREES
RBC # BLD AUTO: 3.68 X10*6/UL (ref 4.5–5.9)
SODIUM SERPL-SCNC: 130 MMOL/L (ref 136–145)
T AXIS: 44 DEGREES
T OFFSET: 388 MS
VENTRICULAR RATE: 110 BPM
WBC # BLD AUTO: 12.1 X10*3/UL (ref 4.4–11.3)

## 2025-02-13 PROCEDURE — 85025 COMPLETE CBC W/AUTO DIFF WBC: CPT

## 2025-02-13 PROCEDURE — 37799 UNLISTED PX VASCULAR SURGERY: CPT

## 2025-02-13 PROCEDURE — 2500000001 HC RX 250 WO HCPCS SELF ADMINISTERED DRUGS (ALT 637 FOR MEDICARE OP)

## 2025-02-13 PROCEDURE — 83605 ASSAY OF LACTIC ACID: CPT

## 2025-02-13 PROCEDURE — 97161 PT EVAL LOW COMPLEX 20 MIN: CPT | Mod: GP

## 2025-02-13 PROCEDURE — 2500000005 HC RX 250 GENERAL PHARMACY W/O HCPCS: Performed by: STUDENT IN AN ORGANIZED HEALTH CARE EDUCATION/TRAINING PROGRAM

## 2025-02-13 PROCEDURE — 84075 ASSAY ALKALINE PHOSPHATASE: CPT

## 2025-02-13 PROCEDURE — 83735 ASSAY OF MAGNESIUM: CPT

## 2025-02-13 PROCEDURE — 1200000002 HC GENERAL ROOM WITH TELEMETRY DAILY

## 2025-02-13 PROCEDURE — 2500000004 HC RX 250 GENERAL PHARMACY W/ HCPCS (ALT 636 FOR OP/ED)

## 2025-02-13 PROCEDURE — 2500000002 HC RX 250 W HCPCS SELF ADMINISTERED DRUGS (ALT 637 FOR MEDICARE OP, ALT 636 FOR OP/ED)

## 2025-02-13 PROCEDURE — 94640 AIRWAY INHALATION TREATMENT: CPT

## 2025-02-13 PROCEDURE — 2500000002 HC RX 250 W HCPCS SELF ADMINISTERED DRUGS (ALT 637 FOR MEDICARE OP, ALT 636 FOR OP/ED): Performed by: STUDENT IN AN ORGANIZED HEALTH CARE EDUCATION/TRAINING PROGRAM

## 2025-02-13 PROCEDURE — 97165 OT EVAL LOW COMPLEX 30 MIN: CPT | Mod: GO

## 2025-02-13 PROCEDURE — 80069 RENAL FUNCTION PANEL: CPT

## 2025-02-13 PROCEDURE — 99291 CRITICAL CARE FIRST HOUR: CPT

## 2025-02-13 PROCEDURE — 82947 ASSAY GLUCOSE BLOOD QUANT: CPT

## 2025-02-13 RX ORDER — ENOXAPARIN SODIUM 100 MG/ML
40 INJECTION SUBCUTANEOUS EVERY 24 HOURS
Status: DISCONTINUED | OUTPATIENT
Start: 2025-02-13 | End: 2025-02-21 | Stop reason: HOSPADM

## 2025-02-13 RX ORDER — ENOXAPARIN SODIUM 100 MG/ML
40 INJECTION SUBCUTANEOUS EVERY 24 HOURS
Status: CANCELLED | OUTPATIENT
Start: 2025-02-13

## 2025-02-13 RX ADMIN — Medication 2 L/MIN: at 08:03

## 2025-02-13 RX ADMIN — IPRATROPIUM BROMIDE AND ALBUTEROL SULFATE 3 ML: .5; 3 SOLUTION RESPIRATORY (INHALATION) at 21:08

## 2025-02-13 RX ADMIN — POLYETHYLENE GLYCOL 3350 17 G: 17 POWDER, FOR SOLUTION ORAL at 08:50

## 2025-02-13 RX ADMIN — IPRATROPIUM BROMIDE AND ALBUTEROL SULFATE 3 ML: .5; 3 SOLUTION RESPIRATORY (INHALATION) at 14:20

## 2025-02-13 RX ADMIN — CLOPIDOGREL BISULFATE 75 MG: 75 TABLET ORAL at 06:41

## 2025-02-13 RX ADMIN — IPRATROPIUM BROMIDE AND ALBUTEROL SULFATE 3 ML: .5; 3 SOLUTION RESPIRATORY (INHALATION) at 08:03

## 2025-02-13 RX ADMIN — ENOXAPARIN SODIUM 40 MG: 100 INJECTION SUBCUTANEOUS at 17:36

## 2025-02-13 RX ADMIN — ROSUVASTATIN CALCIUM 40 MG: 40 TABLET, FILM COATED ORAL at 20:04

## 2025-02-13 RX ADMIN — ASPIRIN 81 MG: 81 TABLET, COATED ORAL at 08:50

## 2025-02-13 RX ADMIN — Medication 40 PERCENT: at 07:47

## 2025-02-13 RX ADMIN — INSULIN LISPRO 1 UNITS: 100 INJECTION, SOLUTION INTRAVENOUS; SUBCUTANEOUS at 09:09

## 2025-02-13 ASSESSMENT — COGNITIVE AND FUNCTIONAL STATUS - GENERAL
DRESSING REGULAR LOWER BODY CLOTHING: A LITTLE
TOILETING: A LITTLE
EATING MEALS: A LITTLE
STANDING UP FROM CHAIR USING ARMS: A LITTLE
DRESSING REGULAR UPPER BODY CLOTHING: A LITTLE
MOVING TO AND FROM BED TO CHAIR: A LITTLE
TURNING FROM BACK TO SIDE WHILE IN FLAT BAD: A LITTLE
WALKING IN HOSPITAL ROOM: A LITTLE
MOBILITY SCORE: 18
HELP NEEDED FOR BATHING: A LITTLE
DAILY ACTIVITIY SCORE: 18
CLIMB 3 TO 5 STEPS WITH RAILING: A LOT
PERSONAL GROOMING: A LITTLE

## 2025-02-13 ASSESSMENT — PAIN - FUNCTIONAL ASSESSMENT
PAIN_FUNCTIONAL_ASSESSMENT: 0-10

## 2025-02-13 ASSESSMENT — PAIN SCALES - GENERAL
PAINLEVEL_OUTOF10: 0 - NO PAIN

## 2025-02-13 ASSESSMENT — ACTIVITIES OF DAILY LIVING (ADL)
BATHING_ASSISTANCE: MINIMAL
ADL_ASSISTANCE: INDEPENDENT
ADL_ASSISTANCE: INDEPENDENT

## 2025-02-13 NOTE — PROGRESS NOTES
Physical Therapy    Physical Therapy Evaluation    Patient Name: Mickey Dakin  MRN: [id number]  Department: [facility]  Room: 20/20A  Today's Date: 2/13/2025   Time Calculation  Start Time: 1021  Stop Time: 1038  Time Calculation (min): 17 min    Assessment/Plan   PT Assessment  PT Assessment Results: Decreased strength, Decreased endurance, Impaired balance, Decreased mobility  Rehab Prognosis: Good  Barriers to Discharge Home: No anticipated barriers  Evaluation/Treatment Tolerance: Patient tolerated treatment well  Medical Staff Made Aware: Yes  End of Session Communication: Bedside nurse  Assessment Comment: Pt performed bed mobility with Min A, functional transfers with CGA-Min A, and ambulated 30' with FWW with CGA. Pt will continue to benefit from skilled PT to improve functional mobility.  End of Session Patient Position: Up in chair, Alarm off, not on at start of session  IP OR SWING BED PT PLAN  Inpatient or Swing Bed: Inpatient  PT Plan  Treatment/Interventions: Bed mobility, Transfer training, Gait training, Stair training, Balance training, Strengthening, Endurance training, Therapeutic exercise, Therapeutic activity, Home exercise program, Positioning  PT Plan: Ongoing PT  PT Frequency: 3 times per week  PT Discharge Recommendations: Low intensity level of continued care  PT Recommended Transfer Status: Assist x1  PT - OK to Discharge: Yes    Subjective   General Visit Information:  General  Reason for Referral: presented today to [facility] for elective complex PCI procedure for MVD 2/11/25.  Had high risk PCI with Lcx stent inserted  Past Medical History Relevant to Rehab: PMHx infrarenal AAA, severe MVD, moderate aortic stenosis and regurgitation, severe COPD, past stroke  Co-Treatment: OT  Co-Treatment Reason: Co-treat due to anticipated need for 2 skilled therapists 2/2 prolonged bedrest  Prior to Session Communication: Bedside nurse  Patient Position Received: Bed, 3 rail up  Preferred Learning Style:  auditory, verbal, visual  General Comment: Pt awake and willing to participate in PT evaluation. (Lines: 2 L O2 NC, A line, tele)  Home Living:  Home Living  Type of Home: House  Lives With: Spouse  Home Adaptive Equipment: Walker rolling or standard, Cane  Home Layout: One level, Laundry in basement  Home Access: Stairs to enter without rails  Entrance Stairs-Number of Steps: 3  Bathroom Shower/Tub: Tub/shower unit  Bathroom Equipment: Grab bars in shower  Prior Level of Function:  Prior Function Per Pt/Caregiver Report  Level of [place]: Independent with ADLs and functional transfers  ADL Assistance: Independent  Homemaking Assistance: Independent (wife does laundry)  Ambulatory Assistance: Independent  Vocational: Retired  Prior Function Comments: Drives, no falls  Precautions:  Precautions  Hearing/Visual Limitations: WFL  Medical Precautions: Cardiac precautions, Fall precautions  Precautions Comment: MAP>65; SpO2>90%      Date/Time Vitals Session Patient Position Pulse Resp SpO2 BP MAP (mmHg)    02/13/25 1000 --  --  92  18  92 %  --  --     02/13/25 1021 Pre PT  Lying  91  --  92 %  103/53  73     02/13/25 1038 Post PT  Sitting  106  --  90 %  140/74  99     02/13/25 1100 --  --  91  19  94 %  --  --                 Objective   Pain:  Pain Assessment  Pain Assessment: 0-10  0-10 (Numeric) Pain Score: 0 - No pain  Cognition:  Cognition  Overall Cognitive Status: Within Functional Limits  Orientation Level: Oriented X4    General Assessments:    Activity Tolerance  Endurance: Tolerates 10 - 20 min exercise with multiple rests  Early Mobility/Exercise Safety Screen: Proceed with mobilization - No exclusion criteria met  Activity Tolerance Comments: Vitals stable throughout session    Sensation  Light Touch: No apparent deficits    Strength  Strength Comments: Grossly at least 3/5 based on functional mobility  Strength  Strength Comments: Grossly at least 3/5 based on functional  mobility    Perception  Inattention/Neglect: Appears intact  Initiation: Appears intact  Motor Planning: Appears intact  Perseveration: Not present      Coordination  Movements are Fluid and Coordinated: Yes    Postural Control  Postural Control: Within Functional Limits    Static Sitting Balance  Static Sitting-Balance Support: Feet supported, Bilateral upper extremity supported  Static Sitting-Level of Assistance: Close supervision    Static Standing Balance  Static Standing-Balance Support: Bilateral upper extremity supported  Static Standing-Level of Assistance: Contact guard  Functional Assessments:  Bed Mobility  Bed Mobility: Yes  Bed Mobility 1  Bed Mobility 1: Supine to sitting  Level of Assistance 1: Minimum assistance  Bed Mobility Comments 1: Min A for trunk elevation to seated; safety and line management    Transfers  Transfer: Yes  Transfer 1  Transfer From 1: Bed to  Transfer to 1: Chair with arms  Technique 1: Stand pivot  Transfer Level of Assistance 1: Contact guard  Trials/Comments 1: CGA-Min A for balance, safety and line management; observed inconsistent step placement during 5 side steps to chair  Transfers 2  Transfer From 2: Sit to, Stand to  Transfer to 2: Stand, Sit  Technique 2: Sit to stand, Stand to sit  Transfer Device 2: Walker  Transfer Level of Assistance 2: Contact guard  Trials/Comments 2: CGA for safety and line management    Ambulation/Gait Training  Ambulation/Gait Training Performed: Yes  Ambulation/Gait Training 1  Surface 1: Level tile  Device 1: Rolling walker  Assistance 1: Contact guard  Quality of Gait 1: Inconsistent stride length, Decreased step length, Forward flexed posture  Comments/Distance (ft) 1: x30' with FWW; CGA for safety and line management. Pt requesting to return to seated  Extremity/Trunk Assessments:  Cervical Spine   Cervical Spine: Within Functional Limits  Lumbar Spine   Lumbar Spine : Within Functional Limits    RLE   RLE : Within Functional  Limits  LLE   LLE : Within Functional Limits  Outcome Measures:  [facility] Basic Mobility  Turning from your back to your side while in a flat bed without using bedrails: None  Moving from lying on your back to sitting on the side of a flat bed without using bedrails: A little  Moving to and from bed to chair (including a wheelchair): A little  Standing up from a chair using your arms (e.g. wheelchair or bedside chair): A little  To walk in hospital room: A little  Climbing 3-5 steps with railing: A lot  Basic Mobility - Total Score: 18    FSS-ICU  Ambulation: Walks <50 feet with any assistance x1 or walks any distance with assistance x2 people  Rolling: Supervision or set-up only  Sitting: Supervision or set-up only  Transfer Sit-to-Stand: Minimal assistance (performs 75% or more of task)  Transfer Supine-to-Sit: Minimal assistance (performs 75% or more of task)  Total Score: 19      Early Mobility/Exercise Safety Screen: Proceed with mobilization - No exclusion criteria met  ICU Mobility Scale: Walking with assistance of 1 person [8]    Encounter Problems       Encounter Problems (Active)       Balance       Pt will demonstrate improved sitting/standing static/dynamic balance activities without LOB via score of 24/28 on the [place] for increase in safety prior to DC.  (Progressing)       Start:  02/13/25    Expected End:  02/27/25               Mobility       Pt will tolerate >15 minutes of upright standing activity without seated rest breaks with no changes in vital signs for improved functional mobility.  (Progressing)       Start:  02/13/25    Expected End:  02/27/25            Pt will ambulate >100ft with appropriate form, CGA or less, LRAD, and no LOB for safe DC.  (Progressing)       Start:  02/13/25    Expected End:  02/27/25            Pt will ambulate up/down 3 stairs without hand rail with CGA or less to safely access their home upon DC.   (Progressing)       Start:  02/13/25    Expected End:  02/27/25                PT Transfers       Pt will perform bed mobility and sit<>stand transfers independently to safely DC.  (Progressing)       Start:  02/13/25    Expected End:  02/27/25                   Education Documentation  Body Mechanics, taught by Ines Bergman PT at 2/13/2025 11:49 AM.  Learner: Patient  Readiness: Acceptance  Method: Explanation  Response: Verbalizes Understanding    Mobility Training, taught by Ines Bergman PT at 2/13/2025 11:49 AM.  Learner: Patient  Readiness: Acceptance  Method: Explanation  Response: Verbalizes Understanding    Education Comments  No comments found.        INES BERGMAN, PT

## 2025-02-13 NOTE — PROGRESS NOTES
Occupational Therapy    Evaluation    Patient Name: Mickey Dakin  MRN: [id number]  Today's Date: 2/13/2025  Room: 20/20  Time Calculation  Start Time: 1021  Stop Time: 1039  Time Calculation (min): 18 min    Assessment  IP OT Assessment  OT Assessment: Overall decreased strength and endurnace limiting occupational performance.  Prognosis: Good  Barriers to Discharge Home: No anticipated barriers  Evaluation/Treatment Tolerance: Patient tolerated treatment well  Medical Staff Made Aware: Yes  End of Session Communication: Bedside nurse  End of Session Patient Position: Up in chair, Alarm off, not on at start of session  Plan:  Inpatient Plan  Treatment Interventions: ADL retraining, Functional transfer training, UE strengthening/ROM, Endurance training, Neuromuscular reeducation, Compensatory technique education  OT Frequency: 2 times per week  OT Discharge Recommendations: Low intensity level of continued care  Equipment Recommended upon Discharge: Wheeled walker  OT Recommended Transfer Status:  (CGA)  OT - OK to Discharge: Yes  OT Assessment  OT Assessment Results: Decreased ADL status, Decreased upper extremity strength, Decreased endurance, Decreased functional mobility  Prognosis: Good  Evaluation/Treatment Tolerance: Patient tolerated treatment well  Medical Staff Made Aware: Yes    Subjective   Current Problem:  1. Mediastinal hematoma, initial encounter  Transthoracic Echo (TTE) Limited    Transthoracic Echo (TTE) Limited    Transthoracic Echo (TTE) Limited    Transthoracic Echo (TTE) Limited      2. Unstable angina (Multi)  amLODIPine (Norvasc) tablet 10 mg    ezetimibe (Zetia) tablet 10 mg    insulin lispro injection 0-5 Units    rosuvastatin (Crestor) tablet 40 mg    valsartan (Diovan) tablet 160 mg    acetaminophen (Tylenol) tablet 650 mg    acetaminophen (Tylenol) oral liquid 650 mg    acetaminophen (Tylenol) suppository 650 mg    dextrose 50 % injection 12.5 g    dextrose 50 % injection 25 g     glucagon (Glucagen) injection 1 mg    glucagon (Glucagen) injection 1 mg    ondansetron (Zofran) injection 4 mg    oxygen (O2) therapy    ipratropium-albuteroL (Duo-Neb) 0.5-2.5 mg/3 mL nebulizer solution 3 mL    DISCONTINUED: ipratropium-albuteroL (Duo-Neb) 0.5-2.5 mg/3 mL nebulizer solution 3 mL    DISCONTINUED: oxygen (O2) therapy    DISCONTINUED: perflutren lipid microspheres (Definity) injection 0.5-10 mL of dilution    DISCONTINUED: perflutren protein A microsphere (Optison) injection 0.5 mL    DISCONTINUED: sulfur hexafluoride microsphr (Lumason) injection 24.28 mg    DISCONTINUED: norepinephrine (Levophed) 8 mg in dextrose 5% 250 mL (0.032 mg/mL) infusion (premix)    DISCONTINUED: sodium chloride 0.9% infusion      3. Dissection of coronary artery  amLODIPine (Norvasc) tablet 10 mg    ezetimibe (Zetia) tablet 10 mg    insulin lispro injection 0-5 Units    rosuvastatin (Crestor) tablet 40 mg    valsartan (Diovan) tablet 160 mg    acetaminophen (Tylenol) tablet 650 mg    acetaminophen (Tylenol) oral liquid 650 mg    acetaminophen (Tylenol) suppository 650 mg    dextrose 50 % injection 12.5 g    dextrose 50 % injection 25 g    glucagon (Glucagen) injection 1 mg    glucagon (Glucagen) injection 1 mg    ondansetron (Zofran) injection 4 mg    oxygen (O2) therapy    ipratropium-albuteroL (Duo-Neb) 0.5-2.5 mg/3 mL nebulizer solution 3 mL    DISCONTINUED: ipratropium-albuteroL (Duo-Neb) 0.5-2.5 mg/3 mL nebulizer solution 3 mL    DISCONTINUED: oxygen (O2) therapy    DISCONTINUED: perflutren lipid microspheres (Definity) injection 0.5-10 mL of dilution    DISCONTINUED: perflutren protein A microsphere (Optison) injection 0.5 mL    DISCONTINUED: sulfur hexafluoride microsphr (Lumason) injection 24.28 mg    DISCONTINUED: norepinephrine (Levophed) 8 mg in dextrose 5% 250 mL (0.032 mg/mL) infusion (premix)    DISCONTINUED: sodium chloride 0.9% infusion      4. Status post insertion of drug eluting coronary artery stent   amLODIPine (Norvasc) tablet 10 mg    ezetimibe (Zetia) tablet 10 mg    insulin lispro injection 0-5 Units    rosuvastatin (Crestor) tablet 40 mg    valsartan (Diovan) tablet 160 mg    acetaminophen (Tylenol) tablet 650 mg    acetaminophen (Tylenol) oral liquid 650 mg    acetaminophen (Tylenol) suppository 650 mg    dextrose 50 % injection 12.5 g    dextrose 50 % injection 25 g    glucagon (Glucagen) injection 1 mg    glucagon (Glucagen) injection 1 mg    ondansetron (Zofran) injection 4 mg    oxygen (O2) therapy    ipratropium-albuteroL (Duo-Neb) 0.5-2.5 mg/3 mL nebulizer solution 3 mL    DISCONTINUED: ipratropium-albuteroL (Duo-Neb) 0.5-2.5 mg/3 mL nebulizer solution 3 mL    DISCONTINUED: oxygen (O2) therapy    DISCONTINUED: perflutren lipid microspheres (Definity) injection 0.5-10 mL of dilution    DISCONTINUED: perflutren protein A microsphere (Optison) injection 0.5 mL    DISCONTINUED: sulfur hexafluoride microsphr (Lumason) injection 24.28 mg    DISCONTINUED: norepinephrine (Levophed) 8 mg in dextrose 5% 250 mL (0.032 mg/mL) infusion (premix)    DISCONTINUED: sodium chloride 0.9% infusion      5. Coronary artery disease involving native coronary artery of native heart without angina pectoris  amLODIPine (Norvasc) tablet 10 mg    ezetimibe (Zetia) tablet 10 mg    insulin lispro injection 0-5 Units    rosuvastatin (Crestor) tablet 40 mg    valsartan (Diovan) tablet 160 mg    acetaminophen (Tylenol) tablet 650 mg    acetaminophen (Tylenol) oral liquid 650 mg    acetaminophen (Tylenol) suppository 650 mg    dextrose 50 % injection 12.5 g    dextrose 50 % injection 25 g    glucagon (Glucagen) injection 1 mg    glucagon (Glucagen) injection 1 mg    ondansetron (Zofran) injection 4 mg    oxygen (O2) therapy    Insert arterial line    Insert arterial line    Transthoracic Echo (TTE) Limited    Transthoracic Echo (TTE) Limited    ipratropium-albuteroL (Duo-Neb) 0.5-2.5 mg/3 mL nebulizer solution 3 mL    DISCONTINUED:  ipratropium-albuteroL (Duo-Neb) 0.5-2.5 mg/3 mL nebulizer solution 3 mL    DISCONTINUED: oxygen (O2) therapy    DISCONTINUED: perflutren lipid microspheres (Definity) injection 0.5-10 mL of dilution    DISCONTINUED: perflutren protein A microsphere (Optison) injection 0.5 mL    DISCONTINUED: sulfur hexafluoride microsphr (Lumason) injection 24.28 mg    DISCONTINUED: norepinephrine (Levophed) 8 mg in dextrose 5% 250 mL (0.032 mg/mL) infusion (premix)    DISCONTINUED: sodium chloride 0.9% infusion      6. Other pericardial effusion (noninflammatory) (HHS-HCC)  Transthoracic Echo (TTE) Limited    Transthoracic Echo (TTE) Limited        General:  Reason for Referral: 78 y/o male initially presented to [facility] for elective complex PCI procedure for MVD 2/11/25.  Had high risk PCI with Lcx stent inserted.  Past Medical History Relevant to Rehab: Infrarenal AAA, severe MVD, moderate aortic stenosis and regurgitation, severe COPD, past stroke  Co-Treatment: PT  Co-Treatment Reason: Co-treat due to anticipated need for 2 skilled therapists 2/2 prolonged bedrest  Prior to Session Communication: Bedside nurse  Patient Position Received: Bed, 3 rail up, Alarm off, not on at start of session  Family/Caregiver Present: Yes  Caregiver Feedback: Wife present and supportive  General Comment: Pt agreeable to participating.   Precautions:  Hearing/Visual Limitations: WFL  Medical Precautions: Cardiac precautions, Fall precautions, Oxygen therapy device and L/min (2LO2)  Precautions Comment: MAP>65; SpO2>90%  Vital Signs:   02/13/25 1022 02/13/25 1030   Vital Signs   Vitals Session Pre OT Post OT   Heart Rate 91 104   Resp 18 21   SpO2 92 % 90 %   /53 140/76   MAP (mmHg) 72 103   BP Method Arterial line Arterial line   Patient Position Lying Sitting   Vital Signs Comment  --  Brief decrease in O2 to 84% with short ambulation. Steady recovery back to >90%. RN aware.       Pain:  Pain Assessment  Pain Assessment: 0-10  0-10 (Numeric)  Pain Score: 0 - No pain  Lines/Tubes/Drains:  Arterial Line 02/11/25 Left Radial (Active)   Number of days: 1       External Urinary Catheter Male (Active)   Number of days: 1         Objective   Cognition:  Overall Cognitive Status: Within Functional Limits  Orientation Level: Oriented X4           Home Living:  Type of Home: House  Lives With: Spouse  Home Adaptive Equipment: Walker rolling or standard, Cane  Home Layout: One level, Able to live on main level with bedroom/bathroom, Laundry in basement  Home Access: Stairs to enter without rails  Entrance Stairs-Number of Steps: 3  Bathroom Shower/Tub: Tub/shower unit  Bathroom Equipment: Grab bars in shower   Prior Function:  Level of [place]: Independent with ADLs and functional transfers, Independent with homemaking with ambulation  Receives Help From: Family  ADL Assistance: Independent  Homemaking Assistance: Independent  Ambulatory Assistance: Independent  Vocational: Retired  Prior Function Comments: (+)drives, denies falls  IADL History:     ADL:  Grooming Assistance: Minimal  Bathing Assistance: Minimal  UE Dressing Assistance: Stand by  LE Dressing Assistance: Minimal  Toileting Assistance with Device: Minimal  Activity Tolerance:  Endurance: Tolerates 10 - 20 min exercise with multiple rests  Early Mobility/Exercise Safety Screen: Proceed with mobilization - No exclusion criteria met  Activity Tolerance Comments: Pt requested return to room after short duration ambulation, <40'.  Balance:  Static Sitting Balance  Static Sitting-Level of Assistance: Close supervision  Static Standing Balance  Static Standing-Level of Assistance: Contact guard  Bed Mobility/Transfers: Bed Mobility  Bed Mobility: Yes  Bed Mobility 1  Bed Mobility 1: Supine to sitting  Level of Assistance 1: Minimum assistance  Functional Mobility  Functional Mobility Performed: Yes  Functional Mobility 1  Device 1: Rolling walker  Assistance 1: Contact guard  Comments 1: Pt ambulated  through room and hallway [id number] Steady pace.   and Transfers  Transfer: Yes  Transfer 1  Transfer From 1: Sit to  Transfer to 1: Stand  Technique 1: Sit to stand, Stand to sit  Transfer Device 1: Walker  Transfer Level of Assistance 1: Contact guard  Transfers 2  Transfer From 2: Bed to  Transfer to 2: Chair with arms  Technique 2: Stand pivot  Transfer Level of Assistance 2: Contact guard  IADL's:      Vision:     and Vision - Complex Assessment  Ocular Range of Motion: Within Functional Limits  Sensation:  Light Touch: No apparent deficits  Strength:  Strength Comments: BUE strength grossly 3+/5  Perception:  Inattention/Neglect: Appears intact  Coordination:  Movements are Fluid and Coordinated: Yes   Hand Function:  Hand Function  Gross Grasp: Functional  Coordination: Functional  Extremities:  ,  ,  , and      Outcome Measures: [facility] Daily Activity  Putting on and taking off regular lower body clothing: A little  Bathing (including washing, rinsing, drying): A little  Putting on and taking off regular upper body clothing: A little  Toileting, which includes using toilet, bedpan or urinal: A little  Taking care of personal grooming such as brushing teeth: A little  Eating Meals: A little  Daily Activity - Total Score: 18        ICU Mobility Screen  Early Mobility/Exercise Safety Screen: Proceed with mobilization - No exclusion criteria met,          Education Documentation  Body Mechanics, taught by Rosa Alford OT at 2/13/2025 12:13 PM.  Learner: Patient  Readiness: Acceptance  Method: Explanation  Response: Verbalizes Understanding    Precautions, taught by Rosa Alford OT at 2/13/2025 12:13 PM.  Learner: Patient  Readiness: Acceptance  Method: Explanation  Response: Verbalizes Understanding    ADL Training, taught by Rosa Alford OT at 2/13/2025 12:13 PM.  Learner: Patient  Readiness: Acceptance  Method: Explanation  Response: Verbalizes Understanding    Education Comments  No comments  found.        Goals:     Encounter Problems       Encounter Problems (Active)       ADLs       Patient will complete lower body dressing with MOD I  for donning and doffing all LE clothes in order to increase Indep with task participation.        Start:  02/13/25    Expected End:  02/27/25            Patient will complete toileting, including clothing management and hygiene, with MOD I in order to maximize functional Indep with task completion.        Start:  02/13/25    Expected End:  02/27/25               COGNITION/SAFETY       Pt will identify and incorporate 3 EC/WS strategies into daily routines for increased safety and Indep.        Start:  02/13/25    Expected End:  02/27/25               EXERCISE/STRENGTHENING       Pt will increase overall BUE strength to 4/5 in order to increase functional task participation.        Start:  02/13/25    Expected End:  02/27/25            Pt will increase endurance to tolerate 15-20min of activity with no more than 1 rest break in order to increase ability to engage in ADL completion.        Start:  02/13/25    Expected End:  02/27/25               MOBILITY       Pt will demo increased functional mobility to tolerate tasks necessary to complete ADL routine with MOD I.       Start:  02/13/25    Expected End:  02/27/25 02/13/25 at 12:14 PM   Rosa Alford, OT   Rehab Office: [phone]

## 2025-02-13 NOTE — CONSULTS
"Nutrition Initial Assessment:   Nutrition Assessment    Reason for Assessment: Admission nursing screening    Patient is a 79 y.o. male presented to [facility] for elective complex PCI procedure for MVD 2/11/25. During procedure pt was noted with hypotension requiting levophed. Pt was also c/o chest pain. Per MD\". Suspicion for dissection causing by Lcx stent deployment abutting calcific lesion and causing retrograde LM dissection. Cath team was able to deploy stent to cover the lesion and all sxs resolved.\" Pt continued with hypotension and chest pain and was then transferred to [facility] for further care.       Nutrition History:  Food and Nutrient History: Attempted to meet with pt 3x today however he was receivign RN care on first attempte and then sleeping soundly on other attempts. Did attempt to wake him however he continued to sleep soundly. He scored an MST of 2 after reporting weight loss and poor intake r/t decreased appetite. He has consuemd 100% x2 meals over the last 24 hours. Will add Boost VHC daily to provide 530kcal and 22gm protein daily to ensure pt is meeting his nutrient needs.       Anthropometrics:  Height: 167.6 cm (5' 6\")   Weight: 57.6 kg (126 lb 15.8 oz)   BMI (Calculated): 20.51  IBW/kg (Dietitian Calculated): 64.5 kg  Percent of IBW: 89 %       Weight History:   Wt Readings from Last 15 Encounters:   02/13/25 57.6 kg (126 lb 15.8 oz)   02/11/25 55.8 kg (122 lb 15.9 oz)   12/11/24 55.8 kg (123 lb)   12/05/24 56.7 kg (125 lb)   10/25/24 56.3 kg (124 lb 3.2 oz)       Weight Change %:  Weight History / % Weight Change: Per weight history, weight has been stable for the past 5 months.    Nutrition Focused Physical Exam Findings:  Subcutaneous Fat Loss:   Orbital Fat Pads: Mild-Moderate (slight dark circles and slight hollowing)  Buccal Fat Pads: Mild-Moderate (flat cheeks, minimal bounce)  Muscle Wasting:  Temporalis: Mild-Moderate (slight depression) (per visual)  Pectoralis (Clavicular Region): " "Severe (protruding prominent clavicle)  Edema:  Edema: none  Physical Findings:  Skin: Negative    Nutrition Significant Labs:  CBC Trend:   Results from last 7 days   Lab Units 02/13/25 0451 02/12/25 0100 02/11/25  1822 02/11/25  1509   WBC AUTO x10*3/uL 12.1* 19.9* 17.8* 18.0*   RBC AUTO x10*6/uL 3.68* 3.46* 3.08* 3.46*   HEMOGLOBIN g/dL 11.7* 11.3* 10.4* 11.6*   HEMATOCRIT % 33.8* 31.6* 30.4* 32.8*   MCV fL 92 91 99 95   PLATELETS AUTO x10*3/uL 116* 224 264 316    , BMP Trend:   Results from last 7 days   Lab Units 02/13/25 0451 02/12/25 0100 02/11/25  1509   GLUCOSE mg/dL 92 142* 125*   CALCIUM mg/dL 8.3* 8.9 8.3*   SODIUM mmol/L 130* 133* 134*   POTASSIUM mmol/L 4.8 5.0 4.7   CO2 mmol/L 27 25 25   CHLORIDE mmol/L 99 101 101   BUN mg/dL 21 17 11   CREATININE mg/dL 0.56 0.72 0.61    , Renal Lab Trend:   Results from last 7 days   Lab Units 02/13/25 0451 02/12/25 0100 02/11/25  1509 02/11/25  1509   POTASSIUM mmol/L 4.8 5.0  --  4.7   PHOSPHORUS mg/dL 3.1 5.4*   < >  --    SODIUM mmol/L 130* 133*  --  134*   MAGNESIUM mg/dL 2.24 1.77   < >  --    EGFR mL/min/1.73m*2 >90 >90  --  >90   BUN mg/dL 21 17  --  11   CREATININE mg/dL 0.56 0.72  --  0.61    < > = values in this interval not displayed.    , Vit D: No results found for: \"VITD25\" , Vit B12: No results found for: \"UJJFOZXQ78\"     Nutrition Specific Medications:  Scheduled medications  [Held by provider] amLODIPine, 10 mg, oral, Daily  aspirin, 81 mg, oral, Daily  clopidogrel, 75 mg, oral, Daily  enoxaparin, 40 mg, subcutaneous, q24h  ezetimibe, 10 mg, oral, Daily  insulin lispro, 0-5 Units, subcutaneous, TID AC  ipratropium-albuteroL, 3 mL, nebulization, TID  polyethylene glycol, 17 g, oral, Daily  rosuvastatin, 40 mg, oral, Nightly  [Held by provider] valsartan, 160 mg, oral, Daily      Continuous medications     PRN medications  PRN medications: acetaminophen **OR** acetaminophen **OR** acetaminophen, dextrose, dextrose, glucagon, glucagon, " ondansetron, oxygen      I/O:   Last BM Date: 02/10/25;      Dietary Orders (From admission, onward)       Start     Ordered    02/12/25 1443  Adult diet Regular, Cardiac, 2-3 grams sodium; 70 gm fat; 2 - 3 grams Sodium  Diet effective now        Question Answer Comment   Diet type Regular    Diet type Cardiac    Diet type 2-3 grams sodium    Fat restriction: 70 gm fat    Sodium restriction: 2 - 3 grams Sodium        02/12/25 1443    02/11/25 1502  May Participate in Room Service  ( ROOM SERVICE MAY PARTICIPATE)  Once        Question:  .  Answer:  Yes    02/11/25 1501                     Estimated Needs:   Total Energy Estimated Needs in 24 hours (kCal): 1730 kCal  Method for Estimating Needs: 30kcal/kg CBW  Total Protein Estimated Needs in 24 Hours (g): 69 g  Method for Estimating 24 Hour Protein Needs: 1.2g/kg CBW +  Total Fluid Estimated Needs in 24 Hours (mL):  (per MD/team)           Nutrition Diagnosis   Malnutrition Diagnosis  Patient has Malnutrition Diagnosis: Yes  Diagnosis Status: New  Malnutrition Diagnosis: Moderate malnutrition related to chronic disease or condition  Related to: inadequate energy intake  As Evidenced by: Pt visually appears moderate- severe fat and musle loss + reported poor intake (suspect >/=75% for > 1 month)            Nutrition Interventions/Recommendations   Nutrition prescription for oral nutrition    Nutrition Recommendations:  Individualized Nutrition Prescription Provided for : Boost VHC once daily to provide 530kcal 22gm protein.    Nutrition Interventions/Goals:   Interventions: Medical food supplement  Medical Food Supplement: Commercial beverage medical food supplement therapy      Education Documentation  No documentation found.            Nutrition Monitoring and Evaluation   Food/Nutrient Related History Monitoring  Monitoring and Evaluation Plan: Intake / amount of food  Intake / Amount of food: Consumes at least 75% or more of  meals/snacks/supplements    Anthropometric Measurements  Monitoring and Evaluation Plan: Body weight  Body Weight: Body weight - Maintain stable weight    Biochemical Data, Medical Tests and Procedures  Monitoring and Evaluation Plan: Electrolyte/renal panel, Glucose/endocrine profile  Electrolyte and Renal Panel: Electrolytes within normal limits  Glucose/Endocrine Profile: Glucose within normal limits ( mg/dL)              Time Spent (min): 45 minutes

## 2025-02-13 NOTE — CARE PLAN
The patient's goals for the shift include      The clinical goals for the shift include remain HDS stable    Over the shift, the patient did not make progress toward the following goals. Recommendations to address these barriers include   Problem: Pain - Adult  Goal: Verbalizes/displays adequate comfort level or baseline comfort level  Outcome: Progressing     Problem: Safety - Adult  Goal: Free from fall injury  Outcome: Progressing     Problem: Discharge Planning  Goal: Discharge to home or other facility with appropriate resources  Outcome: Progressing     Problem: Chronic Conditions and Co-morbidities  Goal: Patient's chronic conditions and co-morbidity symptoms are monitored and maintained or improved  Outcome: Progressing     Problem: Nutrition  Goal: Nutrient intake appropriate for maintaining nutritional needs  Outcome: Progressing     Problem: Fall/Injury  Goal: Not fall by end of shift  Outcome: Progressing  Goal: Be free from injury by end of the shift  Outcome: Progressing  Goal: Verbalize understanding of personal risk factors for fall in the hospital  Outcome: Progressing  Goal: Verbalize understanding of risk factor reduction measures to prevent injury from fall in the home  Outcome: Progressing  Goal: Use assistive devices by end of the shift  Outcome: Progressing  Goal: Pace activities to prevent fatigue by end of the shift  Outcome: Progressing

## 2025-02-13 NOTE — PROGRESS NOTES
"CARDIAC ICU PROGRESS NOTE    Mickey Dakin/[id number]    Admit Date: 2/11/2025  Hospital Length of Stay: 2   ICU Length of Stay: 1d 15h     Subjective   Upon evaluation this AM, does not report pain in chest or anywhere else. No SOB         Objective    VITALS:   Visit Vitals  /63   Pulse 90   Temp 36.6 °C (97.9 °F) (Temporal)   Resp 19   Ht 1.676 m (5' 6\")   Wt 57.6 kg (126 lb 15.8 oz)   SpO2 93%   BMI 20.50 kg/m²   Smoking Status Every Day   BSA 1.64 m²       Vent settings:    Most Recent Range Past 24hrs   Mode      FiO2   No data recorded   Rate   No data recorded   Vt    No data recorded   PEEP   No data recorded     Invasive Hemodynamics:    Most Recent Range Past 24hrs   BP (Art) 95/53 Arterial Line BP 1  Min: 78/45  Max: 110/60   MAP(Art) 68 mmHg Arterial Line MAP 1 (mmHg)   Min: 58 mmHg  Max: 78 mmHg   RA/CVP   No data recorded   PA   No data recorded   PA(mean)   No data recorded   PCWP   No data recorded   CO   No data recorded   CI   No data recorded   Mixed Venous   No data recorded   SVR    No data recorded   PVR   No data recorded     Infusions:  norepinephrine, Last Rate: Stopped (02/12/25 1444)        INTAKE/OUTPUT:  I/O last 3 completed shifts:  In: 6447.5 (112.1 mL/kg) [P.O.:240; I.V.:228.1 (4 mL/kg); Blood:1030.5; IV Piggyback:4948.9]  Out: 750 (13 mL/kg) [Urine:750 (0.4 mL/kg/hr)]  Weight: 57.5 kg      Wt Readings from Last 5 Encounters:   02/13/25 57.6 kg (126 lb 15.8 oz)   02/11/25 55.8 kg (122 lb 15.9 oz)   12/11/24 55.8 kg (123 lb)   12/05/24 56.7 kg (125 lb)   10/25/24 56.3 kg (124 lb 3.2 oz)       LABS:  CBC:  Recent Labs     02/13/25  0451 02/12/25  0100 02/11/25  1822 02/11/25  1509 02/03/25  1207   WBC 12.1* 19.9* 17.8* 18.0* 9.2   HGB 11.7* 11.3* 10.4* 11.6* 14.8   HCT 33.8* 31.6* 30.4* 32.8* 44.3   * 224 264 316 327   MCV 92 91 99 95 100.5*     CMP:  Recent Labs     02/13/25  0451 02/12/25  0100 02/11/25  1509 02/03/25  1207 01/31/25  0810 12/05/24  0922   * 133* 134* " "135 136 135*   K 4.8 5.0 4.7 4.9 4.9 4.8   CL 99 101 101 98 101 99   CO2 27 25 25 28 29 31   ANIONGAP 9 12 13 9 6* 10   BUN 21 17 11 8 8 9   CREATININE 0.56 0.72 0.61 0.53* 0.64* 0.55   EGFR >90 >90 >90 102 96 >90   MG 2.24 1.77  --   --   --   --      Recent Labs     02/13/25  0451 02/12/25  0100 02/11/25  1509   ALBUMIN 3.3* 3.4 3.4   ALT 1,362* 1,530* 17   * 1,105* 21   BILITOT 0.7 0.8 0.6     COAG:   Recent Labs     02/11/25  1509 02/03/25  1207   INR 1.1 1.0     ABO:   Recent Labs     02/11/25  1842   ABO O     HEME/ENDO:No results for input(s): \"FERRITIN\", \"IRONSAT\", \"TSH\", \"HGBA1C\" in the last 08093 hours.   CARDIAC: No results for input(s): \"LDH\", \"CKMB\", \"TROPHS\", \"BNP\" in the last 49769 hours.    No lab exists for component: \"CK\", \"CKMBP\"  Recent Labs     02/12/25  1450 02/12/25  1128 02/12/25  0925 02/12/25  0603 02/12/25  0100   LACTATEART 1.3 2.4* 1.8 1.9 1.6     No results for input(s): \"TACROLIMUS\", \"SIROLIMUS\", \"CYCLOSPORINE\" in the last 12497 hours.    Results from last 7 days   Lab Units 02/13/25  0451 02/12/25  0100 02/11/25  1822   WBC AUTO x10*3/uL 12.1* 19.9* 17.8*   HEMOGLOBIN g/dL 11.7* 11.3* 10.4*   HEMATOCRIT % 33.8* 31.6* 30.4*   PLATELETS AUTO x10*3/uL 116* 224 264     Results from last 7 days   Lab Units 02/13/25  0451 02/12/25  0100 02/11/25  1509   SODIUM mmol/L 130* 133* 134*   POTASSIUM mmol/L 4.8 5.0 4.7   CO2 mmol/L 27 25 25   ANION GAP mmol/L 9 12 13   BUN mg/dL 21 17 11   CREATININE mg/dL 0.56 0.72 0.61   GLUCOSE mg/dL 92 142* 125*   EGFR mL/min/1.73m*2 >90 >90 >90   MAGNESIUM mg/dL 2.24 1.77  --    PHOSPHORUS mg/dL 3.1 5.4*  --       Results from last 7 days   Lab Units 02/13/25  0451 02/12/25  0100 02/11/25  1509   ALT U/L 1,362* 1,530* 17   AST U/L 957* 1,105* 21   ALK PHOS U/L 70 68 68      Results from last 7 days   Lab Units 02/11/25  1509   INR  1.1     Results from last 7 days   Lab Units 02/12/25  1450 02/12/25  1128 02/12/25  0925   POCT PH, ARTERIAL pH 7.30* " "7.30* 7.30*   POCT PO2, ARTERIAL mm Hg 86 84* 82*   POCT PCO2, ARTERIAL mm Hg 48* 46* 47*   FIO2 % 28 28 28     Results from last 7 days   Lab Units 02/11/25  1509   POCT PH, VENOUS pH 7.21*   POCT PCO2, VENOUS mm Hg 63*     Results from last 7 days   Lab Units 02/13/25  0451 02/12/25  0100 02/11/25  1822   LACTATE mmol/L 1.0 2.0 3.6*               No results found for: \"CKTOTAL\", \"CKMB\", \"CKMBINDEX\", \"TROPONINI\"   No results found for: \"HGBA1C\"   No results found for: \"CHOL\"  No results found for: \"HDL\"  No results found for: \"LDLCALC\"  No results found for: \"TRIG\"  No components found for: \"CHOLHDL\"     IMAGING:   CT angio chest w and wo IV contrast    Result Date: 2/12/2025  Interpreted By:  Eligio Donahue, STUDY: CT ANGIO CHEST W AND WO IV CONTRAST;  2/12/2025 2:39 pm   INDICATION: Signs/Symptoms:per discussion with radiology, evaluate left main coronary artery stent for extravasation with ECG gated non-contrrast and arterial phase.     COMPARISON: None.   ACCESSION NUMBER(S): [id number]   ORDERING CLINICIAN: JUAN DIEGO ROBERTSON   TECHNIQUE: Using multi-detector CT technology, axial, sequential imaging with prospective gating was performed of the chest following the intravenous administration of 90 ML Omnipaque 350.   For optimization of anatomic evaluation, multiplanar reconstruction, maximum intensity projections, and advanced 3-D off-line postprocessing were performed on a dedicated stand-alone workstation by the interpreting physician.   FINDINGS: Potential study limitations:  None.   THORACIC AORTA: The thoracic aorta is normal in course, caliber, and contour. The sinotubular junction is  preserved. There is no evidence for acute aortic pathology, such as dissection, intramural hematoma, or contained rupture. The previously visualized intramural hematoma within the ascending thoracic aorta is less apparent on today's exam. The arch vessel branching pattern is conventional.  All of the arch branch " vessels appear widely patent in their proximal portions. .     REPRESENTATIVE MEASUREMENTS OF THE AORTA: Mid ascending aorta: 3.5 x 3.3 Mid descending thoracic aorta: 2.3 x 2.3 cm   CORONARY ARTERIES: The examination is not optimized for assessment of the coronary arteries. The patient is status post left main and circumflex artery stent placement. There is extensive calcifications of the native coronary arteries. On the current exam, there is no evidence of contrast leak/extravasation from the left coronary artery. There is interval resolution of extraluminal contrast seen on the exam of 02/11/2025.     PULMONARY ARTERIES: The central pulmonary arteries appear  normal. There is slight interval decrease in adventitial pulmonary artery fluid/hemorrhage.   SYSTEMIC AND PULMONARY VEINS: Normal systemic venous and pulmonary venous return. The SVC and IVC are of normal caliber. Normal pulmonary venous anatomy.   CARDIAC CHAMBERS: Normal atrioventricular and ventriculoarterial concordance.   LEFT ATRIUM: Normal size (slight interval improvement in left atrial compression from the patient's mediastinal hematoma.   RIGHT ATRIUM: Normal size   INTERATRIAL SEPTUM: Intact.   LEFT VENTRICLE: Normal size (MYAH-   cm)   RIGHT VENTRICLE: Normal size (MYAH-  cm)   INTERVENTRICULAR SEPTUM: Intact.   AORTIC VALVE: The aortic valve is  trileaflet in morphology.  There is moderate to severe aortic valvular calcification and thickening.   MITRAL VALVE: There is mild leaflet thickening with annular calcifications.   PERICARDIUM: Slight interval decrease in hemorrhagic pericardial effusion.   CHEST: Trachea and central airways are patent. No endobronchial lesion. There is layering debris/mucous within the mainstem bronchi. There is residual patchy parenchymal airspace opacities in the right middle lobe. There is severe emphysematous changes. There is no significant axillary or mediastinal lymph nodes. No hilar adenopathy. Visualized thyroid  is intact. There is slight interval decrease in volume of paraesophageal fluid/hemorrhage.   UPPER ABDOMEN: There is limited evaluation of the upper abdomen.   BONE AND SOFT TISSUE: Multilevel degenerative changes of the thoracic spine.       1.  There has been slight interval improvement in mediastinal and pericardial hemorrhage. Slight interval improvement in the extensive intramural thoracic aortic hematoma. 2. On today's exam there is no evidence of contrast leak or extravasation. 3. Postprocedural changes consistent with left main and left circumflex artery stent placement. Extensive calcifications of the native coronary arteries. 4. There is severe emphysematous changes with residual right middle lobe infiltrate/bronchopneumonia. 5. These findings were discussed with the care team taking care of the patient on 02/12/2025.     MACRO: None   Signed by: Eligio Donahue 2/12/2025 5:32 PM Dictation workstation:   [id number]    Transthoracic Echo (TTE) Limited    Result Date: 2/12/2025   [facility] (formerly [facility]), [address] [place] [address]                Tel [phone] and Fax [phone] TRANSTHORACIC ECHOCARDIOGRAM REPORT  Patient Name:       STEPHAN FERGUSONIN        Reading Physician:    [id number] Ramila Fisher MD Study Date:         2/12/2025           Ordering Provider:    [id number] JUAN DIEGO ROBERTSON MRN/PID:            [id number]            Fellow: Accession#:         [id number]        Nurse: Date of Birth/Age:  1945 / 79      Sonographer:          Halima clemens RDCS Gender assigned at  M                   Additional Staff: Birth: Height:             167.64 cm           Admit Date:           2/11/2025 Weight:             57.15 kg            Admission Status:     Inpatient -                                                                Routine BSA / BMI:          1.64 m2 / 20.34     Encounter#:           [id number]                     kg/m2 Blood Pressure:     106/57 mmHg         Department Location:  [facility] Study Type:    TRANSTHORACIC ECHO (TTE) LIMITED Diagnosis/ICD: Atherosclerotic heart disease of native coronary artery without                angina pectoris-I25.10 Indication:    Evaluate for pericadial effusion CPT Code:      Echo Limited-61910; Doppler Limited-12320; Color Doppler-19181 Patient History: Pertinent History: S/p PCI, severe COPD, CVA, AS and AI, AAA, mediastinal                    hematoma, severe MVD. Study Detail: The following Echo studies were performed: 2D, M-Mode, Doppler and               color flow.  PHYSICIAN INTERPRETATION: Left Ventricle: Left ventricular ejection fraction is normal, by visual estimate at 60-65%. There are no regional left ventricular wall motion abnormalities. The left ventricular cavity size is normal. There is a false tendon visualized in the left ventricle. Left ventricular diastolic filling was not assessed. Left Atrium: The left atrial size was not assessed. Right Ventricle: The right ventricle is normal in size. There is normal right ventricular global systolic function. Right Atrium: The right atrium is normal in size. Aortic Valve: The aortic valve is trileaflet. There is moderate to severe aortic valve cusp calcification. There is no evidence of aortic valve regurgitation. Trileflet AV with calcified and signiicantly restricted leaflets. 2 D appearance is suggestive of at least moderate AS though not quantified by color or spectral Doppler on today's exam. Mitral Valve: The mitral valve is normal in structure. There is mild mitral annular calcification. There is no evidence of mitral valve regurgitation. Tricuspid Valve: The tricuspid valve is structurally normal. No evidence of tricuspid regurgitation. Pulmonic Valve: The pulmonic valve is not well  visualized. Pulmonic valve regurgitation was not assessed. Pericardium: Trivial to small pericardial effusion. There is no evidence of cardiac tamponade. There is an anterior clear space. Aorta: The aortic root is normal. Systemic Veins: The inferior vena cava appears normal in size, with IVC inspiratory collapse greater than 50%. In comparison to the previous echocardiogram(s): Compared with the prior exam from 2/11/2025, today's exam is a limited study without assesment of valve function. There is only a trace to small pericardial effusion today without tamponade physiology. There was repoted to be at least moderate AI previously, not assessed today and by 2 D images appears to have at least moderate AS which was not quantified today. The LV systolicc function remains preserved but not as hyperdynamic as yesterday. LV cavity appears to be better filled today, with yesterday's exam with near cavity obliteration.  CONCLUSIONS:  1. Left ventricular ejection fraction is normal, by visual estimate at 60-65%.  2. There is no evidence of cardiac tamponade.  3. There is moderate to severe aortic valve cusp calcification.  4. Trileflet AV with calcified and signiicantly restricted leaflets. 2 D appearance is suggestive of at least moderate AS though not quantified by color or spectral Doppler on today's exam.  5. Compared with the prior exam from 2/11/2025, today's exam is a limited study without assesment of valve function. There is only a trace to small pericardial effusion today without tamponade physiology. There was repoted to be at least moderate AI previously, not assessed today and by 2 D images appears to have at least moderate AS which was not quantified today. The LV systolicc function remains preserved but not as hyperdynamic as yesterday. LV cavity appears to be better filled today, with yesterday's exam with near cavity obliteration. QUANTITATIVE DATA SUMMARY:  LV SYSTOLIC FUNCTION:                       Normal Ranges: EF-Visual:      63 % LV EF Reported: 63 %  TRICUSPID VALVE/RVSP:         Normal Ranges: IVC Diam:             2.00 cm  [id number] Ramila Fisher MD Electronically signed on 2/12/2025 at 4:21:21 PM  ** Final **     Transthoracic Echo (TTE) Limited    Result Date: 2/12/2025   [facility], [address] [place] [address]                Tel [phone] and Fax [phone] TRANSTHORACIC ECHOCARDIOGRAM REPORT  Patient Name:        MICKEY DAKIN         Reading Physician: [id number] Peter Burnette MD Study Date:          2/11/2025            Ordering Provider: [id number] JUAN DIEGO ROBERTSON MRN/PID:             [id number]             Fellow:            [id number] Rudy Patel MD PhD Accession#:          [id number]         Nurse: Date of Birth/Age:   1945 / 79 years Sonographer:       Fellow exam Gender assigned at   M                    Additional Staff: Birth: Height:                                   Admit Date:        2/11/2025 Weight:                                   Admission Status:  Inpatient - STAT BSA / BMI:           m2 / kg/m2           Encounter#:        [id number] Study Type:    TRANSTHORACIC ECHO (TTE) LIMITED Diagnosis/ICD: Other pericardial effusion (noninflammatory)-I31.39 Indication:    Pericardial Effusion CPT Code:      Echo Limited-87039; Doppler Limited-46655; Color Doppler-04582  Study Detail: The following Echo studies were performed: 2D, M-Mode, Doppler and               color flow.  PHYSICIAN INTERPRETATION: Left Ventricle: Left ventricular ejection fraction is hyperdynamic, by visual estimate at 75%. There is concentric left ventricular hypertrophy. There are no regional left ventricular wall motion abnormalities. The left ventricular cavity size is normal. Left ventricular diastolic filling  was not assessed. Left Atrium: The left atrial size is normal. Right Ventricle: The right ventricle is normal in size. There is normal right ventricular global systolic function. Right Atrium: The right atrium is normal in size. Aortic Valve: The aortic valve was not well visualized. There is mild to moderate aortic valve cusp calcification. There is mild aortic valve regurgitation. Suspect at least mild aortic stenosis (not well interrogated). Mitral Valve: The mitral valve is normal in structure. There is mild mitral annular calcification. There is trace mitral valve regurgitation. Tricuspid Valve: The tricuspid valve is structurally normal. There is trace tricuspid regurgitation. Pulmonic Valve: The pulmonic valve is not well visualized. There is trace pulmonic valve regurgitation. Pericardium: Small to moderate pericardial effusion. Aorta: The aortic root was not well visualized. There is no dilatation of the aortic root. Systemic Veins: The inferior vena cava appears normal in size, with IVC inspiratory collapse greater than 50%.  CONCLUSIONS:  1. Limited fellow echo.  2. Left ventricular ejection fraction is hyperdynamic, by visual estimate at 75%.  3. There is normal right ventricular global systolic function.  4. Mild aortic valve regurgitation.  5. Small to moderate pericardial effusion (predominantly anterior/apical).  6. The inferior vena cava appears normal in size, with IVC inspiratory collapse greater than 50%. QUANTITATIVE DATA SUMMARY:  AORTA MEASUREMENTS:         Normal Ranges: Ao Sinus, d:        3.20 cm (2.1-3.5cm)  LV SYSTOLIC FUNCTION:                      Normal Ranges: EF-Visual:      75 % LV EF Reported: 75 %  TRICUSPID VALVE/RVSP:         Normal Ranges: IVC Diam:             1.50 cm  [id number] Peter Burnette MD Electronically signed on 2/12/2025 at 9:36:05 AM  ** Final **     Cardiac Catheterization Procedure    Result Date: 2/11/2025   [facility], Cath Lab, [address] [place] [address],  [place], [place] [address] Cardiovascular Catheterization Report Patient Name:      MICKEY DAKIN         Performing Physician:  [id number] Carl Gillombardo MD Study Date:        2/11/2025            Verifying Physician:   [id number] Carl Gillombardo MD MRN/PID:           [id number]             Cardiologist/Co-Scrub: Accession#:        [id number]         Ordering Provider:     [id number] CARL B GILLOMBARDO Date of Birth/Age: 1945 / 79 years Cardiologist: Gender:            M                    Fellow:                [id number] Juan Orozco MD Encounter#:        [id number]           Surgeon:  Study:            PCI - Percutaneous Coronary Intervention Additional Study: FFR - Fractional Flow Reserve Additional Study: OCT - Optical Coherence Tomography Additional Study: Left Heart Cath  Indications: MICKEY DAKIN is a 79 year old male who presents with dyslipidemia, hypertension, chronic pulmonary disease and Tobacco Use - Current, Angina. MICKEY DAKIN is a 79 year old male who presents with coronary artery disease, aortic stenosis, chronic pulmonary disease, dyslipidemia, hypertension and a chest pain assessment of typical angina Angina. Worsening angina.  Appropriate Use Criteria: Unstable angina with intermediate risk score; AUC score = 8. Medical History: Stress test performed: No. CTA performed: No. Agatston accessed: No. LVEF Assessed: Yes. LVEF = 55%. Cardiac arrest: No. Cardiac surgical consult: Completed - cardiac surgery not recommended. Cardiovascular instability: No. Frailty status of patient entering lab: 6 = Moderately frail.  Procedure Description: After infiltration with 1% Lidocaine, the right radial artery was cannulated with a modified Seldinger technique. Subsequently a 6  Setswana sheath was placed retrograde in the right radial artery. Additional catheter(s) used to visualize the coronary arteries were: EBU 3.5. Multiple injections of contrast were made into the left coronary arteries with angiograms recorded in multiple projections. After completion of the procedure, the arterial sheath was pulled and a TR Band Radial Compression Device was utilized to obtain patent hemostasis.  Coronary Angiography: The coronary circulation is right dominant.  Left Main Coronary Artery: The left main coronary artery is a normal caliber vessel. The left main arises normally from the left coronary sinus of Valsalva and bifurcates into the LAD and circumflex coronary arteries. The left main coronary artery showed moderate atherosclerotic disease and calcification. The mid to distal left main coronary artery showed 30% stenosis.  Left Anterior Descending Coronary Artery Distribution: The left anterior descending coronary artery is a normal caliber vessel. The LAD arises normally from the left main coronary artery. The LAD demonstrated moderate atherosclerotic disease and calcification. The mid left anterior descending coronary artery showed 60% stenosis.  Circumflex Coronary Artery Distribution: The circumflex coronary artery is a normal caliber vessel. The circumflex arises normally from the left main coronary artery and terminates in the AV groove. The circumflex revealed moderate ostial atherosclerotic disease and severe calcification. The ostial circumflex coronary artery showed 70% stenosis. An additional lesion, located at the proximal circumflex coronary artery, demonstrated 90% stenosis. A third circumflex lesion, located at the distal circumflex coronary artery revealed 70% stenosis. The 1st obtuse marginal branch showed mild atherosclerotic disease. The entire 1st obtuse marginal branch showed 30% stenosis.  Right Coronary Artery Distribution: The right coronary artery could not be  successfully injected.  Coronary Interventions: Angiography reveals a 90% stenosis of the ostial and proximal circumflex coronary artery. Pre-intervention KATE flow was 3. Percutaneous coronary intervention was performed within the ostial and proximal circumflex. The vessel was pre-dilated using a non-compliant balloon 2.0 mm x 15 mm at 20 AN. Gurpreet Lanier 2.25 mm x 30 mm was advanced to the lesion and implanted at 12 AN. A second Capron Lanier 3.0 mm x 8 mm was implanted in overlap at 11 AN. The stent was post dilated using a non-compliant balloon 2.5 mm x 12 mm at 14-16 AN. The stenosis was successfully reduced from 90% to 0%. Post intervention Optical Coherence Tomography (OCT) was performed within the ostial and proximal circumflex . The stent demonstrated good apposition. No edge dissection is visualized. Post-intervention KATE flow was 3. Heparin was administered and therapeutic ACT's were maintained for the entirety the procedure. We then engaged the ostium of the left main coronary artery using a 7 Lao EBU 3.5 guide catheter. After obtaining diagnostic cine images from multiple fluoroscopic views, we then advanced a coronary pressure wire into the left main coronary artery, disengaged our guide and equalized pressures between the pressure wire and our manifold. We then advanced the coronary pressure wire into the distal segment of the left anterior descending artery with relative ease and obtained pressure recordings to adjudicate the intermediate lesion in the proximal to mid segment of the vessel. RFR for the LAD lesion was negative at a value of 0.92. We then advanced a coronary run-through wire into the distal segment of the left circumflex artery, after which we advanced a Prowater coronary wire into the ramus intermedius with relative ease. We then predilated the lesion in the proximal segment of the left circumflex artery using a 2.0 mm by 15 mm noncompliant balloon which was serially inflated  up to 20 benita. We then attempted to advance a dragonfly OCT imaging catheter into position, however became clear that we needed to further pretreat the calcified lesion in the proximal segment of the left circumflex artery. Informed by these findings, we then predilated the lesion using a 2.25 mm x 12 mm noncompliant balloon which was serially inflated up to 18 benita. After removing the 2.25 mm noncompliant balloon, we then advanced a 2.5 mm x 8 mm noncompliant balloon, with which we further predilated the lesion serially inflating up to 20 benita. At this point in the case we obtained OCT imaging which confirmed our suspicion regarding lesion severity, further characterize the lesion as diffusely calcific, and confirmed that we had in fact cracked the critical lesion in the proximal segment of the vessel. Satisfied with our lesion preparation, after moving the OCT imaging catheter we then advanced a 2.25 mm x 30 mm Lawrence frontier drug-eluting stent, which was subsequently deployed at 12 benita. The stent was quickly postdilated using the 2.5 mm noncompliant balloon.  Immediately after stent deployment, the patient complained of new severe chest discomfort, there was significant ST segment depressions noted on telemetry, and there was clear staining of contrast within the left main coronary artery extending into the root of the aorta.  The patient became hypotensive with systolic blood pressures in the 50s, but never lost consciousness. We started low-dose norepinephrine drip which was uptitrated to 0.07 mcg, and gave multiple boluses of IV fluids totaling approximately 500 cc. 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 Informed by these findings we then attempted to seal the dissection off using a 3.0 mm x 8 mm Gurpreet frontier drug-eluting stent, which was  subsequently deployed at 12 benita. We then postdilated the stent using a 3.25 mm and then 3.5 mm noncompliant balloons, which were both serially inflated up to 18 benita making sure to flare the stent in the ostium of the left main coronary artery. Stenting of the left main coronary artery immediately stabilize the patient, chest pain completely resolved, hemodynamics significantly improved to the point that norepinephrine drip was quickly down titrated and weaned off entirely, and ST segment changes on the monitor resolved. We then obtained cine images from multiple fluoroscopic views which demonstrated KATE-3 flow in all coronary vascular territories. He then removed our equipment in the usual fashion and hemostasis of the 7 [place] right radial artery access site was achieved using a single TR band.  Coronary Lesion Summary: Vessel       Stenosis   Vessel Segment Left Main  30% stenosis mid to distal LAD        60% stenosis      mid Circumflex 70% stenosis     ostial Circumflex 90% stenosis    proximal Circumflex 70% stenosis     distal OM 1       30% stenosis     entire  Hemo Personnel: +---------------------------+---------+ Name                       Duty      +---------------------------+---------+ Gillombardo, Carl Barton MDPROD TORRES 1 +---------------------------+---------+  Hemodynamic Pressures:  +----+---------------------+----------+-------------+--------------+---------+ Site      Date Time      Phase NameSystolic mmHgDiastolic mmHgMean mmHg +----+---------------------+----------+-------------+--------------+---------+   AO 2/11/2025 9:09:47 AM  AIR REST          130            64       88 +----+---------------------+----------+-------------+--------------+---------+   AO 2/11/2025 9:26:33 AM  AIR REST          125            66       90 +----+---------------------+----------+-------------+--------------+---------+   AO 2/11/2025 9:55:28 AM  AIR REST          118            66        86 +----+---------------------+----------+-------------+--------------+---------+   AO2/11/2025 10:36:46 AM  AIR REST          110            58       77 +----+---------------------+----------+-------------+--------------+---------+   AO2/11/2025 11:35:30 AM  AIR REST           95            56       72 +----+---------------------+----------+-------------+--------------+---------+   AO2/11/2025 11:45:01 AM  AIR REST          116            60       80 +----+---------------------+----------+-------------+--------------+---------+   AO2/11/2025 11:46:54 AM  AIR REST          126            68       92 +----+---------------------+----------+-------------+--------------+---------+  Cardiac Cath Post Procedure Notes: Post Procedure Diagnosis: S/p RFR evluation of LAD                           s/p OCT guided PCI of LCX                           s/p OCT guided PCI of LM. Blood Loss:               Estimated blood loss during the procedure was 10 mls. Specimens Removed:        Number of specimen(s) removed: none.  Recommendations: Maximize medical therapy. Agressive risk factor modification efforts. CT surgical consultation to consider possible surgical options. Consider referral to cardiac rehabilitation. ____________________________________________________________________________________ CONCLUSIONS:  1. S/p pressure wire evaluation (RFR) of intermediate LAD lesion, which was negative at 0.92.  2. S/p OCT guided PCI to ostioproximal LCX using a 2.25mm x 30mm josiane frontier MARIBELL, postdilated to 2.5mm.  3. Case was complicated by retrograde dissection from ostium of LCX though the left main coronary artery and into the root of the aorta, necessitating the placement of a second drug-eluting stent (3.0 mm x 8 mm Burnside frontier drug-eluting stent, postdilated to 3.5 mm) in the left main coronary artery which successfully stabilized the patient.  4. DAPT with aspirin + clopidogrel.  5. Transfer to  [facility] CICU for close monitoring and serial TTE + CTA Aorta. ICD 10 Codes: Angina pectoris, unspecified-I20.9  CPT Codes: Ultrasound guidance for vascular access-20963; Stent w angio ather Left Circumflex single major Artery branch (PCI)-75245.LC; Stent w angio ather Left Main single major Artery branch (PCI)-45340.LM; Left Heart Cath (visualization of coronaries) and LV-62883; OCT Initial Vessel (coronary native vessel or graft) during diagnostic evaluation and/or therapeutic intervention, initial vessel-87592  [id number] Carl Gillombardo MD Performing Physician Electronically signed by [id number] Carl Gillombardo MD on 2/11/2025 at 5:17:09 PM  ** Final **     Transthoracic Echo (TTE) Limited    Result Date: 2/11/2025   [facility], [address] [place] [address]                Tel [phone] and Fax [phone] TRANSTHORACIC ECHOCARDIOGRAM REPORT  Patient Name:       MICKEY DAKIN Reading Physician:    [id number] José Martinez MD Study Date:         2/11/2025           Ordering Provider:    [id number] JUAN DIEGO ROBERTSON MRN/PID:            [id number]            Fellow: Accession#:         [id number]        Nurse: Date of Birth/Age:  1945 / 79      Sonographer:          Trevor clemens                                     RD, RVT Gender assigned at  M                   Additional Staff: Birth: Height:             167.64 cm           Admit Date:           2/11/2025 Weight:             55.34 kg            Admission Status:     Inpatient - STAT BSA / BMI:          1.62 m2 / 19.69     Encounter#:           [id number]                     kg/m2 Blood Pressure:     149/89 mmHg         Department Location:  [facility] Study Type:    TRANSTHORACIC ECHO (TTE) LIMITED Diagnosis/ICD: Other pericardial effusion (noninflammatory)-I31.39 Indication:     pericardial effusion CPT Code:      Echo Limited-89447; Doppler Limited-52475; Color Doppler-50120 Patient History: Pertinent History: CAD, pericardial effusion. Study Detail: The following Echo studies were performed: 2D, Doppler, color flow               and M-Mode.  PHYSICIAN INTERPRETATION: Left Ventricle: Left ventricular ejection fraction is hyperdynamic, by visual estimate at 70-75%. There are no regional left ventricular wall motion abnormalities. The left ventricular cavity size is normal. Left ventricular diastolic filling was not assessed. Left Atrium: The left atrial size is normal. Right Ventricle: The right ventricle is normal in size. There is normal right ventricular global systolic function. Right Atrium: The right atrium is normal in size. Aortic Valve: The aortic valve was not well visualized. There is moderate aortic valve regurgitation. The peak instantaneous gradient of the aortic valve is 27 mmHg. The mean gradient of the aortic valve is 14 mmHg. The aortic regurgitation is incompletely characterized but appears to be at least moderate. There appears to be at least mild aortic stenosis. Mitral Valve: The mitral valve is normal in structure. There is moderate mitral annular calcification. There is trace mitral valve regurgitation. Tricuspid Valve: The tricuspid valve is structurally normal. There is trace tricuspid regurgitation. The Doppler estimated RVSP is mildly elevated at 35.7 mmHg. Pulmonic Valve: The pulmonic valve was not assessed. Pulmonic valve regurgitation was not assessed. Pericardium: Small pericardial effusion anterior to the right ventricle. Aorta: The aortic root was not well visualized. The ascending aorta was not well visualized. Systemic Veins: The inferior vena cava appears normal in size, with IVC inspiratory collapse greater than 50%. In comparison to the previous echocardiogram(s): Compared with study dated 2/11/2025, the prior study was a limited echo by the on call  cardiology fellow. The pericardial effusion appears mildly smaller on the current study.  CONCLUSIONS:  1. Limited study to assess pericardial effusion.  2. Left ventricular ejection fraction is hyperdynamic, by visual estimate at 70-75%.  3. There is normal right ventricular global systolic function.  4. There is moderate mitral annular calcification.  5. Mildly elevated right ventricular systolic pressure.  6. The aortic regurgitation is incompletely characterized but appears to be at least moderate. There appears to be at least mild aortic stenosis.  7. Small pericardial effusion anterior to the right ventricle.  8. Both ventricles are collapsible and appear underfilled.  9. The inferior vena cava appears normal in size, with IVC inspiratory collapse greater than 50%. QUANTITATIVE DATA SUMMARY:  2D MEASUREMENTS:          Normal Ranges: LVEDV Index:     23 ml/m2  LV SYSTOLIC FUNCTION:                      Normal Ranges: EF-A4C View:    83 % (>=55%) EF-A2C View:    76 % EF-Biplane:     80 % EF-Visual:      73 % LV EF Reported: 73 %  AORTIC VALVE:           Normal Ranges: AoV Vmax:     2.60 m/s  (<=1.7m/s) AoV Peak P.0 mmHg (<20mmHg) AoV Mean P.0 mmHg (1.7-11.5mmHg) AoV VTI:      36.70 cm  (18-25cm)  TRICUSPID VALVE/RVSP:          Normal Ranges: Peak TR Velocity:     2.86 m/s RV Syst Pressure:     36 mmHg  (< 30mmHg) IVC Diam:             1.80 cm  [id number] José Martinez MD Electronically signed on 2025 at 4:20:17 PM  ** Final **     Electrocardiogram 12 Lead    Result Date: 2025  Sinus tachycardia Possible Anterior infarct (cited on or before 2024) Abnormal ECG When compared with ECG of 2024 12:23, No significant change was found    Transthoracic Echo (TTE) Complete    Result Date: 2025   [facility], [address] [place] [address]              Tel [phone] and Fax [phone] TRANSTHORACIC ECHOCARDIOGRAM REPORT  Patient Name:       MICKEY DAKIN         Reading Physician:    [id number] Anoop Mckinley DO Study Date:         2/11/2025           Ordering Provider:    [id number] RAYMON CONTRERAS MRN/PID:            [id number]            Fellow: Accession#:         [id number]        Nurse: Date of Birth/Age:  1945 / 79      Sonographer:          Saul clemens                                     RDWANDA Gender assigned at  M                   Additional Staff: Birth: Height:             167.00 cm           Admit Date:           2/11/2025 Weight:             55.00 kg            Admission Status:     Inpatient -                                                               Critical/Stat                                                               (within 1-3                                                               hours) BSA / BMI:          1.61 m2 / 19.72     Encounter#:           [id number]                     kg/m2 Blood Pressure:     119/105 mmHg        Department Location:  [facility] Study Type:    TRANSTHORACIC ECHO (TTE) COMPLETE Diagnosis/ICD: Other pericardial effusion (noninflammatory)-I31.39 Indication:    Status post insertion of drug eluting coronary artery stent CPT Code:      Echo Complete w Full Doppler-28645 Patient History: Pertinent History: CAD. pericardial effusion. Study Detail: The following Echo studies were performed: 2D, M-Mode, Doppler and               color flow. Technically challenging study due to body habitus,               poor acoustic windows and sitting upright.  PHYSICIAN INTERPRETATION: Left Ventricle: Left ventricular ejection fraction is low normal, calculated by Geiger's biplane at 53%. There are no regional left ventricular wall motion abnormalities. The left ventricular cavity size is normal. There is normal septal and normal posterior left ventricular wall thickness. There is  left ventricular concentric remodeling. Spectral Doppler shows a Grade I (impaired relaxation pattern) of left ventricular diastolic filling with normal left atrial filling pressure. Left Atrium: The left atrial size is normal. Right Ventricle: The right ventricle is normal in size. There is normal right ventricular global systolic function. Right Atrium: The right atrium is normal in size. Aortic Valve: The aortic valve was not well visualized. There is evidence of mild to moderate aortic valve stenosis. The aortic valve dimensionless index is 0.46. The peak aortic velocity was obtained from the apical view. There is mild to moderate aortic valve regurgitation. The peak instantaneous gradient of the aortic valve is 34 mmHg. The mean gradient of the aortic valve is 17 mmHg. Mitral Valve: The mitral valve is normal in structure. There is mild to moderate mitral annular calcification. There is trace to mild mitral valve regurgitation. Tricuspid Valve: The tricuspid valve is structurally normal. There is trace tricuspid regurgitation. The Doppler estimated RVSP is within normal limits at 19.5 mmHg. Pulmonic Valve: The pulmonic valve is not well visualized. The pulmonic valve regurgitation was not well visualized. Pericardium: Small pericardial effusion localized near the right ventricle. There is no evidence of cardiac tamponade. Aorta: The aortic root is normal. There is no dilatation of the ascending aorta. There is no dilatation of the aortic root. Systemic Veins: The inferior vena cava appears normal in size, with IVC inspiratory collapse greater than 50%. In comparison to the previous echocardiogram(s): Compared with study dated 2/11/2025,. Compared to limited study obtained earlier today, the effusion has expanded and is more RV localizied. The effusion is at least small but without chamber collapse or inflow variation to suggest tamponade at this time. The IVC is normal at 1.8 cm and collapses > 50%. Patient  however is tachycardic.  CONCLUSIONS:  1. Left ventricular ejection fraction is low normal, calculated by Geiger's biplane at 53%.  2. Spectral Doppler shows a Grade I (impaired relaxation pattern) of left ventricular diastolic filling with normal left atrial filling pressure.  3. There is normal right ventricular global systolic function.  4. Right ventricular systolic pressure is within normal limits.  5. Mild to moderate aortic valve stenosis.  6. Mild to moderate aortic valve regurgitation.  7. There is no evidence of cardiac tamponade.  8. Compared to limited study obtained earlier today, the effusion has expanded and is more RV localizied. The effusion is at least small but without chamber collapse or inflow variation to suggest tamponade at this time. The IVC is normal at 1.8 cm and collapses > 50%. Patient however is tachycardic. QUANTITATIVE DATA SUMMARY:  2D MEASUREMENTS:          Normal Ranges: IVSd:            0.92 cm  (0.6-1.1cm) LVPWd:           0.90 cm  (0.6-1.1cm) LVIDd:           3.61 cm  (3.9-5.9cm) LVIDs:           2.33 cm LV Mass Index:   58 g/m2 LVEDV Index:     32 ml/m2 LV % FS          35.2 %  LEFT ATRIUM:                  Normal Ranges: LA Vol A4C:        30.1 ml    (22+/-6mL/m2) LA Vol A2C:        33.7 ml LA Vol BP:         34.6 ml LA Vol Index A4C:  18.6ml/m2 LA Vol Index A2C:  20.9 ml/m2 LA Vol Index BP:   21.5 ml/m2 LA Area A4C:       13.3 cm2 LA Area A2C:       15.3 cm2 LA Major Axis A4C: 5.0 cm LA Major Axis A2C: 5.9 cm LA Volume Index:   21.5 ml/m2 LA Vol A4C:        27.8 ml LA Vol A2C:        31.3 ml LA Vol Index BSA:  18.3 ml/m2  RIGHT ATRIUM:         Normal Ranges: RA Area A4C:  8.6 cm2  AORTA MEASUREMENTS:         Normal Ranges: Ao Sinus, d:        3.20 cm (2.1-3.5cm) Ao STJ, d:          2.70 cm (1.7-3.4cm) Asc Ao, d:          3.00 cm (2.1-3.4cm)  LV SYSTOLIC FUNCTION:                      Normal Ranges: EF-A4C View:    54 % (>=55%) EF-A2C View:    61 % EF-Biplane:     53 % LV EF  Reported: 53 %  LV DIASTOLIC FUNCTION:          Normal Ranges: MV Peak E:             0.51 m/s (0.7-1.2 m/s) MV Peak A:             1.21 m/s (0.42-0.7 m/s) E/A Ratio:             0.42     (1.0-2.2)  MITRAL VALVE:         Normal Ranges: MV DT:        89 msec (150-240msec)  AORTIC VALVE:                      Normal Ranges: AoV Vmax:                2.90 m/s  (<=1.7m/s) AoV Peak P.6 mmHg (<20mmHg) AoV Mean P.5 mmHg (1.7-11.5mmHg) LVOT Max Silvio:            1.20 m/s  (<=1.1m/s) AoV VTI:                 39.71 cm  (18-25cm) LVOT VTI:                18.42 cm LVOT Diameter:           2.00 cm   (1.8-2.4cm) AoV Area, VTI:           1.45 cm2  (2.5-5.5cm2) AoV Area,Vmax:           1.29 cm2  (2.5-4.5cm2) AoV Dimensionless Index: 0.46  AORTIC INSUFFICIENCY: AI Vmax:       3.39 m/s AI Half-time:  536 msec AI Decel Time: 1847 msec AI Decel Rate: 183.52 cm/s2  RIGHT VENTRICLE: RV Basal 2.20 cm RV Mid   1.20 cm RV Major 7.0 cm TAPSE:   15.6 mm  TRICUSPID VALVE/RVSP:          Normal Ranges: Peak TR Velocity:     2.03 m/s Est. RA Pressure:     3 mmHg RV Syst Pressure:     20 mmHg  (< 30mmHg) IVC Diam:             1.80 cm  PULMONIC VALVE:          Normal Ranges: PV Accel Time:  133 msec (>120ms) PV Max Silvio:     1.4 m/s  (0.6-0.9m/s) PV Max P.4 mmHg  [id number] Anoop Mckinley DO Electronically signed on 2025 at 3:17:02 PM  ** Final **     CT angio chest w and wo IV contrast    Result Date: 2025  Interpreted By:  Eligio Donahue, STUDY: CT ANGIO CHEST W AND WO IV CONTRAST;  2025 12:38 pm   INDICATION: Signs/Symptoms:LM dissection vs perforation s/p stenting.     COMPARISON: 2025.   ACCESSION NUMBER(S): [id number]   ORDERING CLINICIAN: AKIN DENNIS   TECHNIQUE: Using multi-detector CT technology, axial, sequential imaging with prospective gating was performed of the chest following the intravenous administration of 100 ML Omnipaque 350.   For optimization of anatomic evaluation,  multiplanar reconstruction, maximum intensity projections, and advanced 3-D off-line postprocessing were performed on a dedicated stand-alone workstation by the interpreting physician.   FINDINGS: Potential study limitations:  None.   THORACIC AORTA: There is extensive atherosclerotic calcification of the thoracic aorta. There is interval extensive intramural hematoma consistent with acute dissecting intramural hematoma.. There is contrast extravasation at the level of the aortic annulus/left main coronary artery. The patient is status post left main coronary artery stenting. There is extensive calcification of the native coronary arteries. The sinotubular junction is  preserved. . The arch vessel branching pattern is  conventional.  All of the arch branch vessels appear widely patent in their proximal portions. Visualized proximal abdominal aorta is normal. The celiac trunk, SMA and bilateral renal arteries are normal in caliber.   REPRESENTATIVE MEASUREMENTS OF THE AORTA: Mid ascending thoracic aorta: 3.6 x 3.5 cm. Mid descending thoracic aorta: 2.2 by 2.4 cm.   CORONARY ARTERIES: The examination is not optimized for assessment of the coronary arteries. The patient is status post left main coronary artery stenting. There is extensive calcification within the native coronary arteries.     PULMONARY ARTERIES: The central pulmonary arteries compressed. There is diffuse adventitial fluid/hemorrhage.   SYSTEMIC AND PULMONARY VEINS: Normal systemic venous and pulmonary venous return. The SVC and IVC are of normal caliber. Normal pulmonary venous anatomy.   CARDIAC CHAMBERS: Normal atrioventricular and ventriculoarterial concordance.   LEFT ATRIUM: Normal size there is left atrial compression by mediastinal hematoma.   RIGHT ATRIUM: Normal size   INTERATRIAL SEPTUM: Intact.   LEFT VENTRICLE: Left ventricular hypertrophy.   RIGHT VENTRICLE: Normal size (MYAH-  cm)   INTERVENTRICULAR SEPTUM: Intact.   AORTIC VALVE: The  aortic valve is  trileaflet in morphology. Mild thickening and calcifications.. Findings suggest acute intramural hematoma with exit site at the posterior commissure of the aortic valve..   MITRAL VALVE: Mild mitral annular calcifications.   PERICARDIUM: There is high attenuation pericardial fluid/hemorrhage.   CHEST: Trachea and central airways are patent. No endobronchial lesion. There is no focal pulmonary mass or consolidation. No pleural effusion or pneumothorax. There is severe emphysematous changes. There are right middle lobe parenchymal airspace opacities. There is extensive infiltrative fluid/hematoma throughout the mediastinum. There is diffuse esophageal thickening with high attenuation within the esophageal wall. Visualized thyroid is intact.   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 It measures 5.2 x 5.4 cm.     BONE AND SOFT TISSUE: No suspicious osseous abnormality.       1.  There is acute mediastinal hemorrhage with intramural hematoma of the ascending thoracic aorta. 2. Findings suggest acute intramural hematoma at the proximal left main coronary artery stent. There is a exit site/contrast extravasation into the mediastinum at the level of the posterior commissure of the aortic valve. 3. Diffuse mediastinal hemorrhage surrounds the esophagus and a component of esophageal intramural hematoma can not be excluded. Mediastinal hemorrhage with left atrial compression. 4. Is hemorrhagic pericardial fluid. 5. There is severe emphysematous changes with right middle lobe opacities and correlate with a component of developing infiltrate/aspiration. 6. These findings were discussed with the patient's caregivers at 1:30 p.m. on 02/11/2025.     MACRO: None   Signed by: Eligio Donahue 2/11/2025 1:49 PM Dictation workstation:   [id number]    Transthoracic Echo (TTE) Limited    Result Date: 2/11/2025   [facility], [address] [place] [address]               Tel [phone] and Fax [phone] TRANSTHORACIC ECHOCARDIOGRAM REPORT  Patient Name:       MICKEY DAKIN        Reading Physician:    [id number] Romie Perera MD Study Date:         2/11/2025           Ordering Provider:    [id number] RAYMON CONTRERAS MRN/PID:            [id number]            Fellow: Accession#:         [id number]        Nurse: Date of Birth/Age:  1945 / 79      Sonographer:          Jackie clemens RDCS Gender assigned at  M                   Additional Staff: Birth: Height:                                 Admit Date:           2/11/2025 Weight:                                 Admission Status:     Inpatient -                                                               Routine BSA / BMI:          m2 / kg/m2          Encounter#:           [id number] Blood Pressure:     /                   Department Location:  [facility]                                                               Invasive Study Type:    TRANSTHORACIC ECHO (TTE) LIMITED Diagnosis/ICD: Other pericardial effusion (noninflammatory)-I31.39 Indication:    supected coronary perforation r/o pericardial efusion CPT Code:      Echo Limited-79583; Doppler Limited-96085; Color Doppler-52945 Patient History: Pertinent History: CAD. Study Detail: The following Echo studies were performed: 2D, M-Mode, Doppler and               color flow.  PHYSICIAN INTERPRETATION: Left Ventricle: The left ventricle was not well visualized. The left ventricular ejection fraction could not be measured. The left ventricular cavity size was not assessed. Left ventricular diastolic filling was not assessed. Left Atrium: The left atrial size was not assessed. Right Ventricle: The right ventricle was not assessed. There is normal right ventricular global systolic  function. Right Atrium: The right atrial size was not assessed. Aortic Valve: The aortic valve was not assessed. There is indeterminate aortic valve regurgitation. Mitral Valve: The mitral valve was not well visualized. There is trace mitral valve regurgitation. Tricuspid Valve: The tricuspid valve was not well visualized. Tricuspid regurgitation was not assessed. Pulmonic Valve: The pulmonic valve was not assessed. Pulmonic valve regurgitation was not assessed. Pericardium: Trivial pericardial effusion. Aorta: The aortic root was not assessed. In comparison to the previous echocardiogram(s): Compared with study dated 1/16/2025, the pericardial effusion is now trivial (only seen in systole) compared to none on prior study.  CONCLUSIONS:  1. The left ventricle was not well visualized. The left ventricular ejection fraction could not be measured.  2. There is normal right ventricular global systolic function.  3. Trivial pericardial effusion.  4. Compared with study dated 1/16/2025, the pericardial effusion is now trivial (only seen in systole) compared to none on prior study. QUANTITATIVE DATA SUMMARY:  [id number] Romie Perera MD Electronically signed on 2/11/2025 at 12:32:42 PM  ** Final **       PHYSICAL EXAM:  Constitutional: No acute distress, cooperative   HEENT: No conjunctival icterus, EOMI grossly intact   Cardiovascular: RRR, normal S1/S2, no murmurs noted  Pulmonary: Clear to auscultation b/l, no wheezes/crackles/rhonchi, no increased work of breathing on exam  GI: Soft, non-tender, non-distended, normoactive bowel sounds  Lower extremities: Warm and well perfused, no lower extremity edema  Neuro: A&O x3, able to move all 4 extremities spontaneously  Psych: Appropriate mood and affect       MEDICATIONS:   Scheduled medications  [Held by provider] amLODIPine, 10 mg, oral, Daily  aspirin, 81 mg, oral, Daily  clopidogrel, 75 mg, oral, Daily  [Held by provider] ezetimibe, 10 mg, oral, Daily  insulin lispro, 0-5  Units, subcutaneous, TID AC  ipratropium-albuteroL, 3 mL, nebulization, TID  perflutren lipid microspheres, 0.5-10 mL of dilution, intravenous, Once in imaging  perflutren protein A microsphere, 0.5 mL, intravenous, Once in imaging  polyethylene glycol, 17 g, oral, Daily  [Held by provider] rosuvastatin, 40 mg, oral, Nightly  sulfur hexafluoride microsphr, 2 mL, intravenous, Once in imaging  [Held by provider] valsartan, 160 mg, oral, Daily        Continuous medications  norepinephrine, 0.01-1 mcg/kg/min, Last Rate: Stopped (02/12/25 6484)        PRN medications  PRN medications: acetaminophen **OR** acetaminophen **OR** acetaminophen, dextrose, dextrose, glucagon, glucagon, ondansetron, oxygen          Assessment/Plan   79 y.o. male presented today to [facility] for elective complex PCI procedure for MVD 2/11/25.   PMHx infrarenal AAA, severe MVD, moderate aortic stenosis and regurgitation, severe COPD.  Had high risk PCI with Lcx stent inserted complicated by dissection, then complicated by retrograde leak and mediastinal bleed.   Admitted for observation. High risk for CABG.  In case of tamponade, he will require pericardial window .  Dr Steiner on board. Saw the patient upon arrival.     Notably, pt was discussed at CHIP meeting and recommended for PCI of LAD, LCx, and RCA given heavily calcified ascending aorta precluding clamping for CABG and high risk comorbidities precluding CABG.   . During complex PCI procedure, stent was deployed and team immediately noted hypotension down to 50s SBP requiring levophed, T wave inversion, and blush near distal LM on cath imaging.   Pt c/o chest pain. Suspicion for dissection causing by Lcx stent deployment abutting calcific lesion and causing retrograde LM dissection. Cath team was able to deploy stent to cover the lesion and all sxs resolved.   Immediately upon arrival to ICU, patient continued to complain of 3-4 midsternal chest pain and was exhibiting hypotension to 70-80s  SBP. Fluids started. Bedside and formal echos performed which demonstrated no evidence of tamponade per verbal report.   [facility] of care.       2/13 Updates  - CTA 2/12 with slight improvement in mediastinal and pericardial hemorrhage  - TTE 2/12 also without signs of tamponade  - patient received 2 L fluids and 1 unit pRBC 2/12, succesfuly weaned from levophed gtt  - plt decreased to 116 from 224, hgb 11.7 from 11.3, not proper incrementation. WBC downtrending, suggesting leukocytosis was reactive.   - LFTs downtrending, continue to hold zetia and statin     NEURO:   # Hx remote CVA  - Serial neuro and pain assessments  - Analgesia: PRN Tylenol     CV:   # Severe MVD s/p complex PCI to LAD, Lcx, RCA with LM dissection now s/p stenting  # Infrarenal MVD  # Moderate aortic stenosis and regurgitation - stable per intracath TTE  # Ascending aortic calcification  # HTN  -aspirin and plavix post stent  -rosuvastatin and zetia (held due to liver injury) - resume when LFTs normalize  - Volume resuscitate as needed  - CT surgery aware - no surgical plans currently   - CTA 2/12 with slight improvement in mediastinal and pericardial hemorrhage  - TTE 2/12 also without signs of tamponade      #hemorrhagic shock  -plan as above  - 2/11- received 1 L bolus, 1 unit RBC, 1 unit plt, 1 unit cryo   - patient received 2 L fluids and 1 unit pRBC 2/12, succesfuly weaned from levophed gtt  -lactate has normalized      PULM:   # Severe COPD, current smoker  - Wean FiO2 to SpO2 > 92%     GI:   - CLD: ADAT  - GI ppx: pantoprazole     /Renal:   Preserved renal function at baseline  Did receive contrast during cath and subsequently for CTA chest with ECG-gating  - Trend RFP, replete lytes per protocol  - Strict I&Os     HEME:   - CBC daily  - DVTppx: SCDs. No chemoppx d/t recent cath     ENDO:   - POCT BG, ISS q4AC/HS  - Maintain BG < 180. Hypoglycemia protocol     ID:   - no evidence of infection right  now  - leukocytosis, may be reactive, will send for urine and blood cultures     F: PRN   E: PRN   N: Regular diet, low salt   Access/Tubes: PIV  Antimicrobials: none      DVT prophylaxis: Scds, on DAPT after stenting.  GI prophylaxis: PPI oral     Bowel Reg: Miralax daily  Oxygen: NC 2 l/min  Code status: Full code   NOK: Dakin,Sally (Spouse)  [phone] (Mobile)        Code Status: Full Code (confirmed on admission)   NOK: Extended Emergency Contact Information  Primary Emergency Contact: Dakin,Sally  Address: [address] [place] [address] [address] [place] [address]  Mobile Phone: [phone]  Relation: Spouse  Secondary Emergency Contact: Sandra Martins  Address: [address] [place] [address]  Mobile Phone: [phone]  Relation: Daughter        Raymundo Cortessusanna TORRES  Internal Medicine, PGY-2

## 2025-02-14 ENCOUNTER — APPOINTMENT (OUTPATIENT)
Dept: RADIOLOGY | Facility: HOSPITAL | Age: 80
End: 2025-02-14
Payer: MEDICARE

## 2025-02-14 ENCOUNTER — APPOINTMENT (OUTPATIENT)
Dept: VASCULAR MEDICINE | Facility: HOSPITAL | Age: 80
DRG: 205 | End: 2025-02-14
Payer: MEDICARE

## 2025-02-14 LAB
ALBUMIN SERPL BCP-MCNC: 3.2 G/DL (ref 3.4–5)
ALP SERPL-CCNC: 71 U/L (ref 33–136)
ALT SERPL W P-5'-P-CCNC: 1120 U/L (ref 10–52)
ANION GAP BLDV CALCULATED.4IONS-SCNC: 7 MMOL/L (ref 10–25)
ANION GAP SERPL CALC-SCNC: 10 MMOL/L (ref 10–20)
AST SERPL W P-5'-P-CCNC: 569 U/L (ref 9–39)
BASE EXCESS BLDV CALC-SCNC: 5.9 MMOL/L (ref -2–3)
BASOPHILS # BLD AUTO: 0.06 X10*3/UL (ref 0–0.1)
BASOPHILS NFR BLD AUTO: 0.5 %
BILIRUB SERPL-MCNC: 0.9 MG/DL (ref 0–1.2)
BLOOD EXPIRATION DATE: NORMAL
BODY TEMPERATURE: 37 DEGREES CELSIUS
BUN SERPL-MCNC: 16 MG/DL (ref 6–23)
CA-I BLDV-SCNC: 1.14 MMOL/L (ref 1.1–1.33)
CALCIUM SERPL-MCNC: 8.3 MG/DL (ref 8.6–10.6)
CHLORIDE BLDV-SCNC: 92 MMOL/L (ref 98–107)
CHLORIDE SERPL-SCNC: 98 MMOL/L (ref 98–107)
CO2 SERPL-SCNC: 26 MMOL/L (ref 21–32)
CREAT SERPL-MCNC: 0.51 MG/DL (ref 0.5–1.3)
DISPENSE STATUS: NORMAL
EGFRCR SERPLBLD CKD-EPI 2021: >90 ML/MIN/1.73M*2
EOSINOPHIL # BLD AUTO: 0.02 X10*3/UL (ref 0–0.4)
EOSINOPHIL NFR BLD AUTO: 0.2 %
ERYTHROCYTE [DISTWIDTH] IN BLOOD BY AUTOMATED COUNT: 14.3 % (ref 11.5–14.5)
GLUCOSE BLD MANUAL STRIP-MCNC: 104 MG/DL (ref 74–99)
GLUCOSE BLD MANUAL STRIP-MCNC: 144 MG/DL (ref 74–99)
GLUCOSE BLD MANUAL STRIP-MCNC: 77 MG/DL (ref 74–99)
GLUCOSE BLDV-MCNC: 116 MG/DL (ref 74–99)
GLUCOSE SERPL-MCNC: 80 MG/DL (ref 74–99)
HCO3 BLDV-SCNC: 31.7 MMOL/L (ref 22–26)
HCT VFR BLD AUTO: 32.2 % (ref 41–52)
HCT VFR BLD EST: 39 % (ref 41–52)
HGB BLD-MCNC: 11.5 G/DL (ref 13.5–17.5)
HGB BLDV-MCNC: 13 G/DL (ref 13.5–17.5)
IMM GRANULOCYTES # BLD AUTO: 0.04 X10*3/UL (ref 0–0.5)
IMM GRANULOCYTES NFR BLD AUTO: 0.3 % (ref 0–0.9)
INHALED O2 CONCENTRATION: 21 %
LACTATE BLDV-SCNC: 0.9 MMOL/L (ref 0.4–2)
LACTATE SERPL-SCNC: 0.8 MMOL/L (ref 0.4–2)
LYMPHOCYTES # BLD AUTO: 1.48 X10*3/UL (ref 0.8–3)
LYMPHOCYTES NFR BLD AUTO: 12.1 %
MAGNESIUM SERPL-MCNC: 2.17 MG/DL (ref 1.6–2.4)
MCH RBC QN AUTO: 32.3 PG (ref 26–34)
MCHC RBC AUTO-ENTMCNC: 35.7 G/DL (ref 32–36)
MCV RBC AUTO: 90 FL (ref 80–100)
MONOCYTES # BLD AUTO: 0.8 X10*3/UL (ref 0.05–0.8)
MONOCYTES NFR BLD AUTO: 6.5 %
NEUTROPHILS # BLD AUTO: 9.88 X10*3/UL (ref 1.6–5.5)
NEUTROPHILS NFR BLD AUTO: 80.4 %
NRBC BLD-RTO: 0 /100 WBCS (ref 0–0)
OXYHGB MFR BLDV: 83.8 % (ref 45–75)
PCO2 BLDV: 50 MM HG (ref 41–51)
PH BLDV: 7.41 PH (ref 7.33–7.43)
PHOSPHATE SERPL-MCNC: 2.6 MG/DL (ref 2.5–4.9)
PLATELET # BLD AUTO: 116 X10*3/UL (ref 150–450)
PO2 BLDV: 51 MM HG (ref 35–45)
POTASSIUM BLDV-SCNC: 4.3 MMOL/L (ref 3.5–5.3)
POTASSIUM SERPL-SCNC: 4.9 MMOL/L (ref 3.5–5.3)
PRODUCT BLOOD TYPE: 5100
PRODUCT CODE: NORMAL
PROT SERPL-MCNC: 5 G/DL (ref 6.4–8.2)
RBC # BLD AUTO: 3.56 X10*6/UL (ref 4.5–5.9)
SAO2 % BLDV: 85 % (ref 45–75)
SODIUM BLDV-SCNC: 126 MMOL/L (ref 136–145)
SODIUM SERPL-SCNC: 129 MMOL/L (ref 136–145)
UNIT ABO: NORMAL
UNIT NUMBER: NORMAL
UNIT RH: NORMAL
UNIT VOLUME: 350
WBC # BLD AUTO: 12.3 X10*3/UL (ref 4.4–11.3)
XM INTEP: NORMAL

## 2025-02-14 PROCEDURE — 94762 N-INVAS EAR/PLS OXIMTRY CONT: CPT

## 2025-02-14 PROCEDURE — 2500000004 HC RX 250 GENERAL PHARMACY W/ HCPCS (ALT 636 FOR OP/ED)

## 2025-02-14 PROCEDURE — 93970 EXTREMITY STUDY: CPT | Performed by: STUDENT IN AN ORGANIZED HEALTH CARE EDUCATION/TRAINING PROGRAM

## 2025-02-14 PROCEDURE — 93970 EXTREMITY STUDY: CPT

## 2025-02-14 PROCEDURE — 99291 CRITICAL CARE FIRST HOUR: CPT

## 2025-02-14 PROCEDURE — 2500000001 HC RX 250 WO HCPCS SELF ADMINISTERED DRUGS (ALT 637 FOR MEDICARE OP)

## 2025-02-14 PROCEDURE — 82947 ASSAY GLUCOSE BLOOD QUANT: CPT

## 2025-02-14 PROCEDURE — 94640 AIRWAY INHALATION TREATMENT: CPT

## 2025-02-14 PROCEDURE — 2500000002 HC RX 250 W HCPCS SELF ADMINISTERED DRUGS (ALT 637 FOR MEDICARE OP, ALT 636 FOR OP/ED)

## 2025-02-14 PROCEDURE — 71045 X-RAY EXAM CHEST 1 VIEW: CPT

## 2025-02-14 PROCEDURE — 80053 COMPREHEN METABOLIC PANEL: CPT

## 2025-02-14 PROCEDURE — 84295 ASSAY OF SERUM SODIUM: CPT

## 2025-02-14 PROCEDURE — 2500000002 HC RX 250 W HCPCS SELF ADMINISTERED DRUGS (ALT 637 FOR MEDICARE OP, ALT 636 FOR OP/ED): Performed by: STUDENT IN AN ORGANIZED HEALTH CARE EDUCATION/TRAINING PROGRAM

## 2025-02-14 PROCEDURE — 83605 ASSAY OF LACTIC ACID: CPT

## 2025-02-14 PROCEDURE — 83735 ASSAY OF MAGNESIUM: CPT

## 2025-02-14 PROCEDURE — 84100 ASSAY OF PHOSPHORUS: CPT

## 2025-02-14 PROCEDURE — 36415 COLL VENOUS BLD VENIPUNCTURE: CPT

## 2025-02-14 PROCEDURE — 1200000002 HC GENERAL ROOM WITH TELEMETRY DAILY

## 2025-02-14 PROCEDURE — 37799 UNLISTED PX VASCULAR SURGERY: CPT

## 2025-02-14 PROCEDURE — 85025 COMPLETE CBC W/AUTO DIFF WBC: CPT

## 2025-02-14 PROCEDURE — 71045 X-RAY EXAM CHEST 1 VIEW: CPT | Performed by: RADIOLOGY

## 2025-02-14 RX ORDER — FUROSEMIDE 10 MG/ML
40 INJECTION INTRAMUSCULAR; INTRAVENOUS ONCE
Status: COMPLETED | OUTPATIENT
Start: 2025-02-14 | End: 2025-02-14

## 2025-02-14 RX ORDER — AMOXICILLIN 250 MG
1 CAPSULE ORAL NIGHTLY
Status: DISCONTINUED | OUTPATIENT
Start: 2025-02-14 | End: 2025-02-15

## 2025-02-14 RX ORDER — METOPROLOL SUCCINATE 25 MG/1
12.5 TABLET, EXTENDED RELEASE ORAL DAILY
Status: DISCONTINUED | OUTPATIENT
Start: 2025-02-15 | End: 2025-02-21 | Stop reason: HOSPADM

## 2025-02-14 RX ORDER — METOPROLOL SUCCINATE 25 MG/1
25 TABLET, EXTENDED RELEASE ORAL DAILY
Status: DISCONTINUED | OUTPATIENT
Start: 2025-02-14 | End: 2025-02-14

## 2025-02-14 RX ORDER — FORMOTEROL FUMARATE 20 UG/2ML
20 SOLUTION RESPIRATORY (INHALATION) 2 TIMES DAILY
Status: DISCONTINUED | OUTPATIENT
Start: 2025-02-14 | End: 2025-02-21 | Stop reason: HOSPADM

## 2025-02-14 RX ORDER — ALBUTEROL SULFATE 0.83 MG/ML
2.5 SOLUTION RESPIRATORY (INHALATION) EVERY 6 HOURS PRN
Status: DISCONTINUED | OUTPATIENT
Start: 2025-02-14 | End: 2025-02-21 | Stop reason: HOSPADM

## 2025-02-14 RX ORDER — GUAIFENESIN 600 MG/1
600 TABLET, EXTENDED RELEASE ORAL 2 TIMES DAILY PRN
Status: DISCONTINUED | OUTPATIENT
Start: 2025-02-14 | End: 2025-02-16

## 2025-02-14 RX ORDER — ALBUTEROL SULFATE 0.83 MG/ML
2.5 SOLUTION RESPIRATORY (INHALATION) 3 TIMES DAILY
Status: DISCONTINUED | OUTPATIENT
Start: 2025-02-14 | End: 2025-02-14

## 2025-02-14 RX ORDER — METOPROLOL SUCCINATE 25 MG/1
12.5 TABLET, EXTENDED RELEASE ORAL DAILY
Qty: 15 TABLET | Refills: 0 | OUTPATIENT
Start: 2025-02-15 | End: 2025-03-17

## 2025-02-14 RX ADMIN — EZETIMIBE 10 MG: 10 TABLET ORAL at 08:23

## 2025-02-14 RX ADMIN — POLYETHYLENE GLYCOL 3350 17 G: 17 POWDER, FOR SOLUTION ORAL at 08:23

## 2025-02-14 RX ADMIN — CLOPIDOGREL BISULFATE 75 MG: 75 TABLET ORAL at 05:59

## 2025-02-14 RX ADMIN — IPRATROPIUM BROMIDE AND ALBUTEROL SULFATE 3 ML: .5; 3 SOLUTION RESPIRATORY (INHALATION) at 09:12

## 2025-02-14 RX ADMIN — ASPIRIN 81 MG: 81 TABLET, COATED ORAL at 08:23

## 2025-02-14 RX ADMIN — SENNOSIDES AND DOCUSATE SODIUM 1 TABLET: 50; 8.6 TABLET ORAL at 20:35

## 2025-02-14 RX ADMIN — GUAIFENESIN 600 MG: 600 TABLET ORAL at 11:41

## 2025-02-14 RX ADMIN — FUROSEMIDE 40 MG: 10 INJECTION, SOLUTION INTRAMUSCULAR; INTRAVENOUS at 12:40

## 2025-02-14 RX ADMIN — ENOXAPARIN SODIUM 40 MG: 100 INJECTION SUBCUTANEOUS at 20:35

## 2025-02-14 RX ADMIN — FUROSEMIDE 40 MG: 10 INJECTION, SOLUTION INTRAMUSCULAR; INTRAVENOUS at 08:51

## 2025-02-14 RX ADMIN — METOPROLOL SUCCINATE 25 MG: 25 TABLET, EXTENDED RELEASE ORAL at 08:51

## 2025-02-14 RX ADMIN — IPRATROPIUM BROMIDE AND ALBUTEROL SULFATE 3 ML: .5; 3 SOLUTION RESPIRATORY (INHALATION) at 14:54

## 2025-02-14 ASSESSMENT — COGNITIVE AND FUNCTIONAL STATUS - GENERAL
DRESSING REGULAR UPPER BODY CLOTHING: A LITTLE
TURNING FROM BACK TO SIDE WHILE IN FLAT BAD: A LITTLE
MOVING TO AND FROM BED TO CHAIR: A LITTLE
TOILETING: A LOT
WALKING IN HOSPITAL ROOM: A LOT
PERSONAL GROOMING: A LITTLE
DAILY ACTIVITIY SCORE: 18
HELP NEEDED FOR BATHING: A LITTLE
CLIMB 3 TO 5 STEPS WITH RAILING: A LOT
DRESSING REGULAR LOWER BODY CLOTHING: A LITTLE
MOBILITY SCORE: 17
STANDING UP FROM CHAIR USING ARMS: A LITTLE

## 2025-02-14 ASSESSMENT — PAIN SCALES - GENERAL
PAINLEVEL_OUTOF10: 0 - NO PAIN

## 2025-02-14 ASSESSMENT — PAIN - FUNCTIONAL ASSESSMENT
PAIN_FUNCTIONAL_ASSESSMENT: 0-10

## 2025-02-14 NOTE — PROGRESS NOTES
"CARDIAC ICU PROGRESS NOTE    Mickey Dakin/[id number]    Admit Date: 2/11/2025  Hospital Length of Stay: 3   ICU Length of Stay: 2d 17h     Subjective   Upon evaluation this AM, does not report pain in chest or anywhere else. No SOB. No other complaints.         Objective    VITALS:   Visit Vitals  /74   Pulse 90   Temp 36.3 °C (97.3 °F) (Temporal)   Resp 19   Ht 1.676 m (5' 6\")   Wt 64.2 kg (141 lb 8.6 oz)   SpO2 92%   BMI 22.84 kg/m²   Smoking Status Every Day   BSA 1.73 m²       Vent settings:    Most Recent Range Past 24hrs   Mode      FiO2   No data recorded   Rate   No data recorded   Vt    No data recorded   PEEP   No data recorded     Invasive Hemodynamics:    Most Recent Range Past 24hrs   BP (Art) 127/63 Arterial Line BP 1  Min: 98/51  Max: 127/63   MAP(Art) 86 mmHg Arterial Line MAP 1 (mmHg)   Min: 69 mmHg  Max: 88 mmHg   RA/CVP   No data recorded   PA   No data recorded   PA(mean)   No data recorded   PCWP   No data recorded   CO   No data recorded   CI   No data recorded   Mixed Venous   No data recorded   SVR    No data recorded   PVR   No data recorded     Infusions:         INTAKE/OUTPUT:  I/O last 3 completed shifts:  In: 960 (15 mL/kg) [P.O.:960]  Out: 1700 (26.5 mL/kg) [Urine:1700 (0.7 mL/kg/hr)]  Weight: 64.2 kg      Wt Readings from Last 5 Encounters:   02/14/25 64.2 kg (141 lb 8.6 oz)   02/11/25 55.8 kg (122 lb 15.9 oz)   12/11/24 55.8 kg (123 lb)   12/05/24 56.7 kg (125 lb)   10/25/24 56.3 kg (124 lb 3.2 oz)       LABS:  CBC:  Recent Labs     02/14/25  0224 02/13/25  0451 02/12/25  0100 02/11/25  1822 02/11/25  1509 02/03/25  1207   WBC 12.3* 12.1* 19.9* 17.8* 18.0* 9.2   HGB 11.5* 11.7* 11.3* 10.4* 11.6* 14.8   HCT 32.2* 33.8* 31.6* 30.4* 32.8* 44.3   * 116* 224 264 316 327   MCV 90 92 91 99 95 100.5*     CMP:  Recent Labs     02/14/25  0224 02/13/25  0451 02/12/25  0100 02/11/25  1509 02/03/25  1207 01/31/25  0810 12/05/24  0922   * 130* 133* 134* 135 136 135*   K 4.9 4.8 5.0 " "4.7 4.9 4.9 4.8   CL 98 99 101 101 98 101 99   CO2 26 27 25 25 28 29 31   ANIONGAP 10 9 12 13 9 6* 10   BUN 16 21 17 11 8 8 9   CREATININE 0.51 0.56 0.72 0.61 0.53* 0.64* 0.55   EGFR >90 >90 >90 >90 102 96 >90   MG 2.17 2.24 1.77  --   --   --   --      Recent Labs     02/14/25 0224 02/13/25  0451 02/12/25  0100 02/11/25  1509   ALBUMIN 3.2* 3.3* 3.4 3.4   ALT 1,120* 1,362* 1,530* 17   * 957* 1,105* 21   BILITOT 0.9 0.7 0.8 0.6     COAG:   Recent Labs     02/11/25  1509 02/03/25  1207   INR 1.1 1.0     ABO:   Recent Labs     02/11/25  1842   ABO O     HEME/ENDO:No results for input(s): \"FERRITIN\", \"IRONSAT\", \"TSH\", \"HGBA1C\" in the last 98116 hours.   CARDIAC: No results for input(s): \"LDH\", \"CKMB\", \"TROPHS\", \"BNP\" in the last 72122 hours.    No lab exists for component: \"CK\", \"CKMBP\"  Recent Labs     02/12/25  1450 02/12/25  1128 02/12/25  0925 02/12/25  0603 02/12/25  0100   LACTATEART 1.3 2.4* 1.8 1.9 1.6     No results for input(s): \"TACROLIMUS\", \"SIROLIMUS\", \"CYCLOSPORINE\" in the last 80014 hours.    Results from last 7 days   Lab Units 02/14/25 0224 02/13/25  0451 02/12/25  0100   WBC AUTO x10*3/uL 12.3* 12.1* 19.9*   HEMOGLOBIN g/dL 11.5* 11.7* 11.3*   HEMATOCRIT % 32.2* 33.8* 31.6*   PLATELETS AUTO x10*3/uL 116* 116* 224     Results from last 7 days   Lab Units 02/14/25 0224 02/13/25  0451 02/12/25  0100   SODIUM mmol/L 129* 130* 133*   POTASSIUM mmol/L 4.9 4.8 5.0   CO2 mmol/L 26 27 25   ANION GAP mmol/L 10 9 12   BUN mg/dL 16 21 17   CREATININE mg/dL 0.51 0.56 0.72   GLUCOSE mg/dL 80 92 142*   EGFR mL/min/1.73m*2 >90 >90 >90   MAGNESIUM mg/dL 2.17 2.24 1.77   PHOSPHORUS mg/dL 2.6 3.1 5.4*      Results from last 7 days   Lab Units 02/14/25 0224 02/13/25  0451 02/12/25  0100   ALT U/L 1,120* 1,362* 1,530*   AST U/L 569* 957* 1,105*   ALK PHOS U/L 71 70 68      Results from last 7 days   Lab Units 02/11/25  1509   INR  1.1     Results from last 7 days   Lab Units 02/12/25  1450 02/12/25  1128 " "02/12/25  0925   POCT PH, ARTERIAL pH 7.30* 7.30* 7.30*   POCT PO2, ARTERIAL mm Hg 86 84* 82*   POCT PCO2, ARTERIAL mm Hg 48* 46* 47*   FIO2 % 28 28 28     Results from last 7 days   Lab Units 02/11/25  1509   POCT PH, VENOUS pH 7.21*   POCT PCO2, VENOUS mm Hg 63*     Results from last 7 days   Lab Units 02/14/25  0224 02/13/25  0451 02/12/25  0100   LACTATE mmol/L 0.8 1.0 2.0               No results found for: \"CKTOTAL\", \"CKMB\", \"CKMBINDEX\", \"TROPONINI\"   No results found for: \"HGBA1C\"   No results found for: \"CHOL\"  No results found for: \"HDL\"  No results found for: \"LDLCALC\"  No results found for: \"TRIG\"  No components found for: \"CHOLHDL\"     IMAGING:   CT angio chest w and wo IV contrast    Result Date: 2/12/2025  Interpreted By:  Eligio Donahue, STUDY: CT ANGIO CHEST W AND WO IV CONTRAST;  2/12/2025 2:39 pm   INDICATION: Signs/Symptoms:per discussion with radiology, evaluate left main coronary artery stent for extravasation with ECG gated non-contrrast and arterial phase.     COMPARISON: None.   ACCESSION NUMBER(S): [id number]   ORDERING CLINICIAN: JUAN DIEGO ROBERTSON   TECHNIQUE: Using multi-detector CT technology, axial, sequential imaging with prospective gating was performed of the chest following the intravenous administration of 90 ML Omnipaque 350.   For optimization of anatomic evaluation, multiplanar reconstruction, maximum intensity projections, and advanced 3-D off-line postprocessing were performed on a dedicated stand-alone workstation by the interpreting physician.   FINDINGS: Potential study limitations:  None.   THORACIC AORTA: The thoracic aorta is normal in course, caliber, and contour. The sinotubular junction is  preserved. There is no evidence for acute aortic pathology, such as dissection, intramural hematoma, or contained rupture. The previously visualized intramural hematoma within the ascending thoracic aorta is less apparent on today's exam. The arch vessel branching pattern is " conventional.  All of the arch branch vessels appear widely patent in their proximal portions. .     REPRESENTATIVE MEASUREMENTS OF THE AORTA: Mid ascending aorta: 3.5 x 3.3 Mid descending thoracic aorta: 2.3 x 2.3 cm   CORONARY ARTERIES: The examination is not optimized for assessment of the coronary arteries. The patient is status post left main and circumflex artery stent placement. There is extensive calcifications of the native coronary arteries. On the current exam, there is no evidence of contrast leak/extravasation from the left coronary artery. There is interval resolution of extraluminal contrast seen on the exam of 02/11/2025.     PULMONARY ARTERIES: The central pulmonary arteries appear  normal. There is slight interval decrease in adventitial pulmonary artery fluid/hemorrhage.   SYSTEMIC AND PULMONARY VEINS: Normal systemic venous and pulmonary venous return. The SVC and IVC are of normal caliber. Normal pulmonary venous anatomy.   CARDIAC CHAMBERS: Normal atrioventricular and ventriculoarterial concordance.   LEFT ATRIUM: Normal size (slight interval improvement in left atrial compression from the patient's mediastinal hematoma.   RIGHT ATRIUM: Normal size   INTERATRIAL SEPTUM: Intact.   LEFT VENTRICLE: Normal size (MYAH-   cm)   RIGHT VENTRICLE: Normal size (MYAH-  cm)   INTERVENTRICULAR SEPTUM: Intact.   AORTIC VALVE: The aortic valve is  trileaflet in morphology.  There is moderate to severe aortic valvular calcification and thickening.   MITRAL VALVE: There is mild leaflet thickening with annular calcifications.   PERICARDIUM: Slight interval decrease in hemorrhagic pericardial effusion.   CHEST: Trachea and central airways are patent. No endobronchial lesion. There is layering debris/mucous within the mainstem bronchi. There is residual patchy parenchymal airspace opacities in the right middle lobe. There is severe emphysematous changes. There is no significant axillary or mediastinal lymph nodes.  No hilar adenopathy. Visualized thyroid is intact. There is slight interval decrease in volume of paraesophageal fluid/hemorrhage.   UPPER ABDOMEN: There is limited evaluation of the upper abdomen.   BONE AND SOFT TISSUE: Multilevel degenerative changes of the thoracic spine.       1.  There has been slight interval improvement in mediastinal and pericardial hemorrhage. Slight interval improvement in the extensive intramural thoracic aortic hematoma. 2. On today's exam there is no evidence of contrast leak or extravasation. 3. Postprocedural changes consistent with left main and left circumflex artery stent placement. Extensive calcifications of the native coronary arteries. 4. There is severe emphysematous changes with residual right middle lobe infiltrate/bronchopneumonia. 5. These findings were discussed with the care team taking care of the patient on 02/12/2025.     MACRO: None   Signed by: Eligio Donahue 2/12/2025 5:32 PM Dictation workstation:   [id number]    Transthoracic Echo (TTE) Limited    Result Date: 2/12/2025   [facility], [address] [place] [address]                Tel [phone] and Fax [phone] TRANSTHORACIC ECHOCARDIOGRAM REPORT  Patient Name:       MICKEY DAKIN        Reading Physician:    [id number] Ramila Fisher MD Study Date:         2/12/2025           Ordering Provider:    [id number] JUAN DIEGO ROBERTSON MRN/PID:            [id number]            Fellow: Accession#:         [id number]        Nurse: Date of Birth/Age:  1945 / 79      Sonographer:          Halima clemens                                     WANDA Gender assigned at  M                   Additional Staff: Birth: Height:             167.64 cm           Admit Date:           2/11/2025 Weight:             57.15 kg            Admission Status:     Inpatient -                                                                Routine BSA / BMI:          1.64 m2 / 20.34     Encounter#:           [id number]                     kg/m2 Blood Pressure:     106/57 mmHg         Department Location:  [facility] Study Type:    TRANSTHORACIC ECHO (TTE) LIMITED Diagnosis/ICD: Atherosclerotic heart disease of native coronary artery without                angina pectoris-I25.10 Indication:    Evaluate for pericadial effusion CPT Code:      Echo Limited-58748; Doppler Limited-57586; Color Doppler-35450 Patient History: Pertinent History: S/p PCI, severe COPD, CVA, AS and AI, AAA, mediastinal                    hematoma, severe MVD. Study Detail: The following Echo studies were performed: 2D, M-Mode, Doppler and               color flow.  PHYSICIAN INTERPRETATION: Left Ventricle: Left ventricular ejection fraction is normal, by visual estimate at 60-65%. There are no regional left ventricular wall motion abnormalities. The left ventricular cavity size is normal. There is a false tendon visualized in the left ventricle. Left ventricular diastolic filling was not assessed. Left Atrium: The left atrial size was not assessed. Right Ventricle: The right ventricle is normal in size. There is normal right ventricular global systolic function. Right Atrium: The right atrium is normal in size. Aortic Valve: The aortic valve is trileaflet. There is moderate to severe aortic valve cusp calcification. There is no evidence of aortic valve regurgitation. Trileflet AV with calcified and signiicantly restricted leaflets. 2 D appearance is suggestive of at least moderate AS though not quantified by color or spectral Doppler on today's exam. Mitral Valve: The mitral valve is normal in structure. There is mild mitral annular calcification. There is no evidence of mitral valve regurgitation. Tricuspid Valve: The tricuspid valve is structurally normal. No evidence of tricuspid regurgitation. Pulmonic Valve:  The pulmonic valve is not well visualized. Pulmonic valve regurgitation was not assessed. Pericardium: Trivial to small pericardial effusion. There is no evidence of cardiac tamponade. There is an anterior clear space. Aorta: The aortic root is normal. Systemic Veins: The inferior vena cava appears normal in size, with IVC inspiratory collapse greater than 50%. In comparison to the previous echocardiogram(s): Compared with the prior exam from 2/11/2025, today's exam is a limited study without assesment of valve function. There is only a trace to small pericardial effusion today without tamponade physiology. There was repoted to be at least moderate AI previously, not assessed today and by 2 D images appears to have at least moderate AS which was not quantified today. The LV systolicc function remains preserved but not as hyperdynamic as yesterday. LV cavity appears to be better filled today, with yesterday's exam with near cavity obliteration.  CONCLUSIONS:  1. Left ventricular ejection fraction is normal, by visual estimate at 60-65%.  2. There is no evidence of cardiac tamponade.  3. There is moderate to severe aortic valve cusp calcification.  4. Trileflet AV with calcified and signiicantly restricted leaflets. 2 D appearance is suggestive of at least moderate AS though not quantified by color or spectral Doppler on today's exam.  5. Compared with the prior exam from 2/11/2025, today's exam is a limited study without assesment of valve function. There is only a trace to small pericardial effusion today without tamponade physiology. There was repoted to be at least moderate AI previously, not assessed today and by 2 D images appears to have at least moderate AS which was not quantified today. The LV systolicc function remains preserved but not as hyperdynamic as yesterday. LV cavity appears to be better filled today, with yesterday's exam with near cavity obliteration. QUANTITATIVE DATA SUMMARY:  LV SYSTOLIC  FUNCTION:                      Normal Ranges: EF-Visual:      63 % LV EF Reported: 63 %  TRICUSPID VALVE/RVSP:         Normal Ranges: IVC Diam:             2.00 cm  [id number] Ramila Fisher MD Electronically signed on 2/12/2025 at 4:21:21 PM  ** Final **       PHYSICAL EXAM:  Constitutional: No acute distress, cooperative   HEENT: No conjunctival icterus, EOMI grossly intact   Cardiovascular: RRR, normal S1/S2, no murmurs noted  Pulmonary: Clear to auscultation b/l, no wheezes/crackles/rhonchi, no increased work of breathing on exam  GI: Soft, non-tender, non-distended, normoactive bowel sounds  Lower extremities: Warm and well perfused, no lower extremity edema  Neuro: A&O x3, able to move all 4 extremities spontaneously  Psych: Appropriate mood and affect       MEDICATIONS:   Scheduled medications  [Held by provider] amLODIPine, 10 mg, oral, Daily  aspirin, 81 mg, oral, Daily  clopidogrel, 75 mg, oral, Daily  enoxaparin, 40 mg, subcutaneous, q24h  ezetimibe, 10 mg, oral, Daily  insulin lispro, 0-5 Units, subcutaneous, TID AC  ipratropium-albuteroL, 3 mL, nebulization, TID  polyethylene glycol, 17 g, oral, Daily  rosuvastatin, 40 mg, oral, Nightly  [Held by provider] valsartan, 160 mg, oral, Daily        Continuous medications         PRN medications  PRN medications: acetaminophen **OR** acetaminophen **OR** acetaminophen, dextrose, dextrose, glucagon, glucagon, ondansetron, oxygen          Assessment/Plan   79 y.o. male presented today to [facility] for elective complex PCI procedure for MVD 2/11/25.   PMHx infrarenal AAA, severe MVD, moderate aortic stenosis and regurgitation, severe COPD.  Had high risk PCI with Lcx stent inserted complicated by dissection, then complicated by retrograde leak and mediastinal bleed.   Admitted for observation. High risk for CABG.  In case of tamponade, he will require pericardial window .  Dr Steiner on board. Saw the patient upon arrival.     Notably, pt was discussed at CHIP meeting and  recommended for PCI of LAD, LCx, and RCA given heavily calcified ascending aorta precluding clamping for CABG and high risk comorbidities precluding CABG.   . During complex PCI procedure, stent was deployed and team immediately noted hypotension down to 50s SBP requiring levophed, T wave inversion, and blush near distal LM on cath imaging.   Pt c/o chest pain. Suspicion for dissection causing by Lcx stent deployment abutting calcific lesion and causing retrograde LM dissection. Cath team was able to deploy stent to cover the lesion and all sxs resolved.   Immediately upon arrival to ICU, patient continued to complain of 3-4 midsternal chest pain and was exhibiting hypotension to 70-80s SBP. Fluids started. Bedside and formal echos performed which demonstrated no evidence of tamponade per verbal report.   [place] [facility] [place]       2/14 Updates  - plt stable at 116, hgb stable at 11.5.  - LFTs downtrending, continue to hold zetia and statin  - Remains on 2L O2 NC    NEURO:   # Hx remote CVA  - Serial neuro and pain assessments  - Analgesia: PRN Tylenol     CV:   # Severe MVD s/p complex PCI to LAD, Lcx, RCA with LM dissection now s/p stenting  # Infrarenal MVD  # Moderate aortic stenosis and regurgitation - stable per intracath TTE  # Ascending aortic calcification  # HTN  -aspirin and plavix post stent  -rosuvastatin and zetia (held due to liver injury) - resume when LFTs normalize  - Volume resuscitate as needed  - CT surgery aware - no surgical plans currently   - CTA 2/12 with slight improvement in mediastinal and pericardial hemorrhage  - TTE 2/12 also without signs of tamponade      #hemorrhagic shock  -plan as above  - 2/11- received 1 L bolus, 1 unit RBC, 1 unit plt, 1 unit cryo   - patient received 2 L fluids and 1 unit pRBC 2/12, succesfuly weaned from levophed gtt  - lactate has normalized      PULM:   # Severe COPD, current smoker  - Wean FiO2 to SpO2 > 92%     GI:   - CLD:  ADAT  - GI ppx: pantoprazole     /Renal:   Preserved renal function at baseline  Did receive contrast during cath and subsequently for CTA chest with ECG-gating  - Trend RFP, replete lytes per protocol  - Strict I&Os     HEME:   - CBC daily  - DVT ppx: SCDs. No chemoppx d/t recent cath.     ENDO:   - POCT BG, ISS q4AC/HS  - Maintain BG < 180. Hypoglycemia protocol     ID:   - no evidence of infection right now  - leukocytosis may be reactive, blood cultures NGTD     F: PRN   E: PRN   N: Regular diet, low salt   Access/Tubes: PIV  Antimicrobials: none     DVT prophylaxis: SCDs, on DAPT after stenting.  GI prophylaxis: PPI oral     Bowel Reg: Miralax daily  Oxygen: NC 2 l/min  Code status: Full code  NOK: RosannainRadha (Spouse)  [phone] (Mobile)        Code Status: Full Code (confirmed on admission)   NOK: Extended Emergency Contact Information  Primary Emergency Contact: Dakin,Sally  Address: [address] [place] [address] [address] [place]           [place] [address]  Mobile Phone: [phone]  Relation: Spouse  Secondary Emergency Contact: Sandra Martins  Address: [address] [place] [id number]  Mobile Phone: [phone]  Relation: Daughter        Adalid Dorado MD  PGY-1 Internal Medicine

## 2025-02-14 NOTE — SIGNIFICANT EVENT
Post-Round Plan Updates  Will trial Lasix 40 mg IV and obtain CXR given persistent O2 requirements. Currently on 2L NC.  Rule out DVT with LE DVT US.  Start low dose metoprolol succinate 12.5 mg daily.  Discharge home if weaned off oxygen versus transfer to [facility] today.

## 2025-02-14 NOTE — PROGRESS NOTES
"Mickey Dakin is a 79 y.o. male on day 3 of admission presenting with Mediastinal hematoma, initial encounter.    Subjective     79 y.o. male with PMHx infrarenal AAA, severe MVD, moderate aortic stenosis and regurgitation, severe COPD who had complex PCI at [facility] for PCI of LAD, LCx, and RCA given heavily calcified ascending aorta precluding clamping for CABG and high risk comorbidities precluding CABG. PCI was c/b retrogade LM dissection following Lcx deployment with pericardial and mediastinal hemorrhage. He was initially stabilized with transfusions and pressors, now off pressors and being transferred to the floor.     On interview, he denies chest pain, SOB, fevers/chills. He endorses non-productive cough that started today and has completely resolved since dose of mucinex this AM. He states he was previously smoking 1/2 ppd, declines nicotine patch at this time as he has been doing well without it while inpatient. He states his last BM was Monday. He states he has ambulated with PT once this admission on 2/13, states he had stiffness/pain in his ankles.       Objective     Physical Exam   Gen: NAD, elderly man resting comfortably in bed  HEENT: EOM intact, oral mucous membranes moist  Resp: diminished breath sounds bilaterally, intermittent faint end-expiratory wheezing, normal respiratory effort on 2L NC  CV: RRR, questionable faint systolic murmur, radial pulses 2+ b/l, dorsalis pedis pulses 1+ b/l  GI: soft, nondistended, nontender  Neuro: EOMI, no facial droop, no pronator drift, antigravity bilateral lower extremities  MSK: 2+ pitting edema below the ankle bilaterally  Skin: Warm, dry, scattered bruises on bilateral arms  Psych: appropriate mood and affect     Last Recorded Vitals  Blood pressure 92/56, pulse 79, temperature 36.4 °C (97.5 °F), temperature source Temporal, resp. rate 18, height 1.676 m (5' 6\"), weight 64.2 kg (141 lb 8.6 oz), SpO2 95%.  Intake/Output last 3 Shifts:  I/O last 3 completed " shifts:  In: 960 (15 mL/kg) [P.O.:960]  Out: 1700 (26.5 mL/kg) [Urine:1700 (0.7 mL/kg/hr)]  Weight: 64.2 kg     Medications   Scheduled medications   [Held by provider] amLODIPine, 10 mg, oral, Daily  aspirin, 81 mg, oral, Daily  clopidogrel, 75 mg, oral, Daily  enoxaparin, 40 mg, subcutaneous, q24h  ezetimibe, 10 mg, oral, Daily  insulin lispro, 0-5 Units, subcutaneous, TID AC  ipratropium-albuteroL, 3 mL, nebulization, TID  [START ON 2/15/2025] metoprolol succinate XL, 12.5 mg, oral, Daily  polyethylene glycol, 17 g, oral, Daily  rosuvastatin, 40 mg, oral, Nightly  [Held by provider] valsartan, 160 mg, oral, Daily      PRN medications   PRN medications: acetaminophen **OR** acetaminophen **OR** acetaminophen, dextrose, dextrose, glucagon, glucagon, guaiFENesin, ondansetron, oxygen     Recent labs  WBC   Date Value Ref Range Status   02/14/2025 12.3 (H) 4.4 - 11.3 x10*3/uL Final   02/13/2025 12.1 (H) 4.4 - 11.3 x10*3/uL Final   02/12/2025 19.9 (H) 4.4 - 11.3 x10*3/uL Final     Hemoglobin   Date Value Ref Range Status   02/14/2025 11.5 (L) 13.5 - 17.5 g/dL Final   02/13/2025 11.7 (L) 13.5 - 17.5 g/dL Final   02/12/2025 11.3 (L) 13.5 - 17.5 g/dL Final     Platelets   Date Value Ref Range Status   02/14/2025 116 (L) 150 - 450 x10*3/uL Final   02/13/2025 116 (L) 150 - 450 x10*3/uL Final   02/12/2025 224 150 - 450 x10*3/uL Final     Glucose   Date Value Ref Range Status   02/14/2025 80 74 - 99 mg/dL Final   02/13/2025 92 74 - 99 mg/dL Final   02/12/2025 142 (H) 74 - 99 mg/dL Final     Sodium   Date Value Ref Range Status   02/14/2025 129 (L) 136 - 145 mmol/L Final   02/13/2025 130 (L) 136 - 145 mmol/L Final   02/12/2025 133 (L) 136 - 145 mmol/L Final     Potassium   Date Value Ref Range Status   02/14/2025 4.9 3.5 - 5.3 mmol/L Final   02/13/2025 4.8 3.5 - 5.3 mmol/L Final   02/12/2025 5.0 3.5 - 5.3 mmol/L Final     Chloride   Date Value Ref Range Status   02/14/2025 98 98 - 107 mmol/L Final   02/13/2025 99 98 - 107  mmol/L Final   02/12/2025 101 98 - 107 mmol/L Final     Urea Nitrogen   Date Value Ref Range Status   02/14/2025 16 6 - 23 mg/dL Final   02/13/2025 21 6 - 23 mg/dL Final   02/12/2025 17 6 - 23 mg/dL Final     Creatinine   Date Value Ref Range Status   02/14/2025 0.51 0.50 - 1.30 mg/dL Final   02/13/2025 0.56 0.50 - 1.30 mg/dL Final   02/12/2025 0.72 0.50 - 1.30 mg/dL Final     Magnesium   Date Value Ref Range Status   02/14/2025 2.17 1.60 - 2.40 mg/dL Final   02/13/2025 2.24 1.60 - 2.40 mg/dL Final   02/12/2025 1.77 1.60 - 2.40 mg/dL Final     Phosphorus   Date Value Ref Range Status   02/14/2025 2.6 2.5 - 4.9 mg/dL Final     Comment:     The performance characteristics of phosphorus testing in heparinized plasma have been validated by the individual  laboratory site where testing is performed. Testing on heparinized plasma is not approved by the FDA; however, such approval is not necessary.   02/13/2025 3.1 2.5 - 4.9 mg/dL Final     Comment:     The performance characteristics of phosphorus testing in heparinized plasma have been validated by the individual  laboratory site where testing is performed. Testing on heparinized plasma is not approved by the FDA; however, such approval is not necessary.   02/12/2025 5.4 (H) 2.5 - 4.9 mg/dL Final     Comment:     The performance characteristics of phosphorus testing in heparinized plasma have been validated by the individual  laboratory site where testing is performed. Testing on heparinized plasma is not approved by the FDA; however, such approval is not necessary.     Calcium   Date Value Ref Range Status   02/14/2025 8.3 (L) 8.6 - 10.6 mg/dL Final   02/13/2025 8.3 (L) 8.6 - 10.6 mg/dL Final   02/12/2025 8.9 8.6 - 10.6 mg/dL Final     Albumin   Date Value Ref Range Status   02/14/2025 3.2 (L) 3.4 - 5.0 g/dL Final   02/13/2025 3.3 (L) 3.4 - 5.0 g/dL Final   02/12/2025 3.4 3.4 - 5.0 g/dL Final         Imaging   Vascular US Lower Extremity Venous Duplex  Bilateral    Result Date: 2/14/2025            [place] [id number]   Tel [phone] and Fax [phone]  Vascular Lab Report [facility] US LOWER EXTREMITY VENOUS DUPLEX BILATERAL  Patient Name:     MICKEY DAKIN        Reading           [id number] Tremayne Lin                                       Physician:        MD Study Date:       2/14/2025           Ordering          [id number] [facility]                                       Physician:        MARCELO MRN/PID:          [id number]            Technologist:     Andrew Bronson RVT Accession#:       [id number]        Technologist 2:   Vilma Garner RVT Date of           1945 / 79      Encounter#:       [id number] Birth/Age:        years Gender:           M Admission Status: Inpatient           Location          [facility]                                       Performed:  Diagnosis/ICD: Localized (leg) edema-R60.0 CPT Codes:     21386 Peripheral venous duplex scan for DVT complete  CONCLUSIONS: Right Lower Venous: No evidence of acute deep vein thrombus visualized in the right lower extremity. Technically difficult study due to edema. Left Lower Venous: No evidence of acute deep vein thrombus visualized in the left lower extremity. Technically difficult study due to edema.  Imaging & Doppler Findings:  Right                 Compressible Thrombus   Flow Distal External Iliac     Yes        None   Pulsatile CFV                       Yes        None   Pulsatile PFV                       Yes        None FV Proximal               Yes        None   Pulsatile FV Mid                    Yes        None FV Distal                 Yes        None Popliteal                 Yes        None   Pulsatile Peroneal                  Yes        None PTV                       Yes        None  Left                  Compress Thrombus   Flow Distal External Iliac   Yes      None   Pulsatile CFV                     Yes      None    Pulsatile PFV                     Yes      None FV Proximal             Yes      None   Pulsatile FV Mid                  Yes      None FV Distal               Yes      None Popliteal               Yes      None   Pulsatile Peroneal                Yes      None PTV                     Yes      None  [id number] Tremayne Lin MD Electronically signed by [id number] Tremayne Lin MD on 2/14/2025 at 4:35:24 PM  ** Final **        This patient currently has cardiac telemetry ordered; if you would like to modify or discontinue the telemetry order, click here to go to the orders activity to modify/discontinue the order.      Assessment/Plan   Assessment & Plan  Mediastinal hematoma, initial encounter    Mr. Dakin is a 79 y.o. 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 Placement of LCx stent was c/b retrograde left main dissection with chest pain and hypotension to SBP 50s. Cath team deployed second stent to LM to cover the lesions. He was resuscitated with 2L LR, 1 unit pRBC, 1 unit platelets, and 1 unit cryo. He was also started on levophed. He was then transferred to CICU where TTE showed no signs of tamponade and CTA showed stable mediastinal and pericardial hemorrhage. He was started on DAPT for LCx stent. Metoprolol succinate 12.5 mg daily was started on 2/14. He continued to require 2L NC on 2/14 so repeat CXR was obtained and he was diuresed with lasix 40 mg x2. He was stable off levophed since 2/11 so he was transferred to the floor on 2/14. LFTs were elevated following PCI placement, suspect 2/2 shock liver as LFTs have been downtrending. Patient also has downtrending  leukocytosis, likely reactive. No signs or symptoms of infection on exam.      2/14 Updates:  - CBC stable 12.3 > 11.5 < 116  - LFTs downtrending, suspect 2/2 shock liver - continue holding zetia and statin  - remains on 2L NC, desatted to 89% on RA after 2x IV lasix 40 mg given today   - switch scheduled duonebs to PRN albuterol nebs, start tiotropium inhaler and formoterol nebs (LABA/LAMA for COPD)  - encourage out of bed and incentive spirometer use  - low suspicion for COPD exacerbation or pneumonia contributing to oxygen requirement  - consider urine lytes in AM if remains hyponatremic     # Severe MVD s/p complex PCI to LAD, Lcx, RCA with LM dissection now s/p stenting  #Mediastinal and pericardial hemorrhage 2/2 LM dissection, improving  # Thoracic and abdominal aortic aneurysms (3.6 cm and 5.4 cm respectively)  # Moderate aortic stenosis and regurgitation - stable per intracath TTE  # HTN  :: 2/11 cath with 90% stenosis of proximal Lcx and 60% stenosis of LAD s/p PCI to Lcx and LM  :: 2/12 CTA with slight improvement in mediastinal and pericardial hemorrhage  :: 2/12 TTE without signs of tamponade, EF 60-65%, mod-severe aortic valve calcification, moderate AS  :: assessed by CT surgery while in CICU, no surgical plans  -aspirin and plavix post stent  -started metoprolol 12.5 mg daily on 2/14  -rosuvastatin and zetia (held due to liver injury) - resume when LFTs normalize  -daily CBC   [ ] will need repeat TTE prior to discharge    #Severe COPD  #current smoker  :: 1/2025 PFTs with FEV1 0.59 L (24%) c/w severe airflow obstruction  - formoterol BID + tiotropium BID   - albuterol nebs PRN  - guaifenesin PRN  - Wean FiO2 to SpO2 > 92%  - Consider nicotine patch on discharge (patient declined while inpatient)    #Constipation  - last BM Monday 2/10, abdomen soft NTND  - miralax daily  - start doc-senna daily     #DVT ppx  - lovenox daily     F: PRN   E: PRN   N: Regular diet, low salt   Access/Tubes:  PIV  Antimicrobials: none  DVT prophylaxis: lovenox    Code status: Full code (confirmed on transfer, patient unsure on whether to change code status so will have further discussion in AM)  NOK: Dakin,Sally (Spouse) [phone] (Mobile)         Annalee Tucker MD  Neurology, PGY-1

## 2025-02-14 NOTE — CARE PLAN
The patient's goals for the shift include      The clinical goals for the shift include remain HDS stable    Over the shift, the patient did not make progress toward the following goals.       Problem: Safety - Adult  Goal: Free from fall injury  Outcome: Progressing     Problem: Discharge Planning  Goal: Discharge to home or other facility with appropriate resources  Outcome: Progressing     Problem: Chronic Conditions and Co-morbidities  Goal: Patient's chronic conditions and co-morbidity symptoms are monitored and maintained or improved  Outcome: Progressing     Problem: Nutrition  Goal: Nutrient intake appropriate for maintaining nutritional needs  Outcome: Progressing     Problem: Fall/Injury  Goal: Not fall by end of shift  Outcome: Progressing  Goal: Be free from injury by end of the shift  Outcome: Progressing  Goal: Verbalize understanding of personal risk factors for fall in the hospital  Outcome: Progressing  Goal: Verbalize understanding of risk factor reduction measures to prevent injury from fall in the home  Outcome: Progressing  Goal: Use assistive devices by end of the shift  Outcome: Progressing  Goal: Pace activities to prevent fatigue by end of the shift  Outcome: Progressing

## 2025-02-14 NOTE — PROGRESS NOTES
ICU to [facility] Transfer Summary     I:  ICU Admission Reason & Brief ICU Course:      79 y.o. male with PMHx infrarenal AAA, severe MVD, moderate aortic stenosis and regurgitation, severe COPD who had complex PCI at [facility] for PCI of LAD, LCx, and RCA given heavily calcified ascending aorta precluding clamping for CABG and high risk comorbidities precluding CABG. During high risk, PCI, there was retrogade LM dissection following Lcx deployment. Patient had CP and hypotension and received fluid resucitation, 1 unit pRBC, 1 unit platelets, 1 unit of cryo, as well as pressor support with levophed. Lm dissection c/b pericardial and mediastinal hemorrhage. Received 2 L LR and 1 unit pRBC 2/12, and now is weaned off levophed. Repeat CTA chest and TTE 2/12 showed improving pericardial and mediastinal hemorrhage, no signs of tamponade. Patient is on DAPT due to Lcx stent deployment. Reactive leukocytosis, no signs of infection. Patient was started on metoprolol succinate 12.5 mg daily on 2/14. Given persistent O2 requirements (2L NC), patient was diuresed with Lasix 40 mg + 40 mg on 2/14.    To Do:  Follow up LFTs (downtrending). Currently holding home zetia and statin.  Follow up O2 requirements: Trialed Lasix 40 mg IV x2 2/14 for persistent AHRF. Remains on 2L O2 NC.  Monitor BP (started on lose dose metop succinate 2/14)  LE DVT US ordered. Follow up final read.  Patient will need limited TTE to reassess hemorrhage before discharge.    Outpatient follow up:  Cardiology: home olmesartan 20 mg daily and felodipine 10 mg daily held during hospitalization and post-discharge. Appointment scheduled for 2/28/25 with Dr. Gillombardo. Consider restarting if indicated.  Outpatient pulmonology: for new diagnosis of COPD (severe emphysematous changes) on CTA 2/11. Patient has not previously followed with pulmonology.    C: Code Status/DPOA Info/Goals of Care/ACP Note    Full Code  DPOA/Contact Number: Extended Emergency Contact  Information  Primary Emergency Contact: Dakin,Sally  Address: [address] [place] [address]  Mobile Phone: [phone]  Relation: Spouse  Secondary Emergency Contact: Sandra Martins  Address: [facility] [place] [address]  Mobile Phone: [phone]  Relation: Daughter     U: Unprescribing & Pertinent High-Risk Medications    Changes to home meds: Holding olmesartan 20 mg daily, felodipine 10 mg daily, Zetia 10 mg daily, and rosuvastatin 40 mg daily     Anticoagulation: Lovenox    Antibiotics:   [x] N/A - no current planned antimicrobioals    P: Pending Tests at the Time of Transfer   None    A: Active consultants, including Rehab:   []  Subspecialty Consultants:   []  PT  []  OT  []  SLP  []  Wound Care    U: Uncertainty Measure/Diagnostic Pause:    Working diagnosis at the time of transfer: dissection of Lcx during complex PCI c/b hemorrhagic pericardial effusion, mediastinal hemorrhage     Diagnosis Degree of Certainty: 1. High degree of certainty about the clinical diagnosis.     S: Summary of Major Problems and To-Dos:      2/14 Updates  - plt stable at 116, hgb stable at 11.5.  - LFTs downtrending, continue to hold zetia and statin  - Trialed Lasix 40 mg IV x2 for persistent AHRF. Remains on 2L O2 NC.  - Started on metoprolol succinate 12.5 mg daily.  - LE DVT US ordered, pending final read.     NEURO:   # Hx remote CVA  - Serial neuro and pain assessments  - Analgesia: PRN Tylenol     CV:   # Severe MVD s/p complex PCI to LAD, Lcx, RCA with LM dissection now s/p stenting  # Infrarenal MVD  # Moderate aortic stenosis and regurgitation - stable per intracath TTE  # Ascending aortic calcification  # HTN  - aspirin and plavix post stent  - rosuvastatin and zetia (held due to liver injury) - resume when LFTs normalize  - Volume resuscitate as needed  - CT surgery aware - no surgical plans currently  - CTA 2/12 with slight improvement in mediastinal and pericardial hemorrhage  -  TTE 2/12 also without signs of tamponade    #hemorrhagic shock, resolved  - Plan as above  - 2/11- received 1 L bolus, 1 unit RBC, 1 unit plt, 1 unit cryo   - patient received 2 L fluids and 1 unit pRBC 2/12, successfully weaned from levophed gtt  - lactate has normalized      PULM:   # Severe COPD, current smoker  - severe emphysematous changes seen on CTA 2/11  - Wean FiO2 to SpO2 88-92%     GI:   - Cardiac diet  - GI ppx: not indicated     /Renal:   - Preserved renal function at baseline  - Did receive contrast during cath and subsequently for CTA chest with ECG-gating  - Trend RFP, replete lytes per protocol  - Strict I&Os     HEME:   - CBC daily  - DVT ppx: SCDs, Lovenox     ENDO:   - POCT BG, ISS q4AC/HS  - Maintain BG < 180. Hypoglycemia protocol     ID:   - no evidence of infection right now  - leukocytosis may be reactive, blood cultures NGTD     E: Exam, including Lines/Drains/Airways & Data Review:   Physical Exam  Constitutional:       General: He is not in acute distress.     Appearance: Normal appearance. He is not ill-appearing.   HENT:      Head: Normocephalic and atraumatic.   Cardiovascular:      Rate and Rhythm: Normal rate and regular rhythm.      Pulses: Normal pulses.      Heart sounds: Normal heart sounds. No murmur heard.     No friction rub. No gallop.   Pulmonary:      Effort: Pulmonary effort is normal. No respiratory distress.      Breath sounds: Normal breath sounds. No wheezing.   Abdominal:      General: Abdomen is flat. There is no distension.      Palpations: Abdomen is soft. There is no mass.      Tenderness: There is no abdominal tenderness. There is no guarding or rebound.   Musculoskeletal:         General: No swelling or deformity. Normal range of motion.   Skin:     General: Skin is warm and dry.   Neurological:      General: No focal deficit present.      Mental Status: He is alert and oriented to person, place, and time. Mental status is at baseline.   Psychiatric:          Mood and Affect: Mood normal.         Behavior: Behavior normal.        Difficult airway? N/A  Lines/drains assessed for removal? No    Within 30 minutes of the patient physically leaving the floor, a Floor Readiness Note needs to be placed with updated vitals.

## 2025-02-15 ENCOUNTER — APPOINTMENT (OUTPATIENT)
Dept: CARDIOLOGY | Facility: HOSPITAL | Age: 80
DRG: 205 | End: 2025-02-15
Payer: MEDICARE

## 2025-02-15 PROBLEM — J43.8 OTHER EMPHYSEMA (MULTI): Status: ACTIVE | Noted: 2025-02-15

## 2025-02-15 PROBLEM — I35.0 MODERATE AORTIC STENOSIS: Status: ACTIVE | Noted: 2025-02-15

## 2025-02-15 LAB
ALBUMIN SERPL BCP-MCNC: 3.4 G/DL (ref 3.4–5)
ALBUMIN SERPL BCP-MCNC: 3.4 G/DL (ref 3.4–5)
ALP SERPL-CCNC: 85 U/L (ref 33–136)
ALT SERPL W P-5'-P-CCNC: 984 U/L (ref 10–52)
ANION GAP SERPL CALC-SCNC: 13 MMOL/L (ref 10–20)
ANION GAP SERPL CALC-SCNC: 8 MMOL/L (ref 10–20)
AST SERPL W P-5'-P-CCNC: 429 U/L (ref 9–39)
BASOPHILS # BLD AUTO: 0.04 X10*3/UL (ref 0–0.1)
BASOPHILS NFR BLD AUTO: 0.4 %
BILIRUB SERPL-MCNC: 1.1 MG/DL (ref 0–1.2)
BUN SERPL-MCNC: 15 MG/DL (ref 6–23)
BUN SERPL-MCNC: 16 MG/DL (ref 6–23)
CALCIUM SERPL-MCNC: 8.4 MG/DL (ref 8.6–10.6)
CALCIUM SERPL-MCNC: 8.5 MG/DL (ref 8.6–10.6)
CHLORIDE SERPL-SCNC: 88 MMOL/L (ref 98–107)
CHLORIDE SERPL-SCNC: 93 MMOL/L (ref 98–107)
CO2 SERPL-SCNC: 28 MMOL/L (ref 21–32)
CO2 SERPL-SCNC: 36 MMOL/L (ref 21–32)
CREAT SERPL-MCNC: 0.5 MG/DL (ref 0.5–1.3)
CREAT SERPL-MCNC: 0.55 MG/DL (ref 0.5–1.3)
EGFRCR SERPLBLD CKD-EPI 2021: >90 ML/MIN/1.73M*2
EGFRCR SERPLBLD CKD-EPI 2021: >90 ML/MIN/1.73M*2
EJECTION FRACTION APICAL 4 CHAMBER: 58.8
EJECTION FRACTION: 63 %
EOSINOPHIL # BLD AUTO: 0.03 X10*3/UL (ref 0–0.4)
EOSINOPHIL NFR BLD AUTO: 0.3 %
ERYTHROCYTE [DISTWIDTH] IN BLOOD BY AUTOMATED COUNT: 13.9 % (ref 11.5–14.5)
GLUCOSE BLD MANUAL STRIP-MCNC: 126 MG/DL (ref 74–99)
GLUCOSE SERPL-MCNC: 146 MG/DL (ref 74–99)
GLUCOSE SERPL-MCNC: 85 MG/DL (ref 74–99)
HCT VFR BLD AUTO: 36.3 % (ref 41–52)
HGB BLD-MCNC: 12.6 G/DL (ref 13.5–17.5)
IMM GRANULOCYTES # BLD AUTO: 0.05 X10*3/UL (ref 0–0.5)
IMM GRANULOCYTES NFR BLD AUTO: 0.4 % (ref 0–0.9)
LACTATE SERPL-SCNC: 0.5 MMOL/L (ref 0.4–2)
LEFT VENTRICLE INTERNAL DIMENSION DIASTOLE: 3.9 CM (ref 3.5–6)
LYMPHOCYTES # BLD AUTO: 1.19 X10*3/UL (ref 0.8–3)
LYMPHOCYTES NFR BLD AUTO: 10.7 %
MAGNESIUM SERPL-MCNC: 1.9 MG/DL (ref 1.6–2.4)
MAGNESIUM SERPL-MCNC: 2.14 MG/DL (ref 1.6–2.4)
MCH RBC QN AUTO: 31.7 PG (ref 26–34)
MCHC RBC AUTO-ENTMCNC: 34.7 G/DL (ref 32–36)
MCV RBC AUTO: 91 FL (ref 80–100)
MONOCYTES # BLD AUTO: 0.93 X10*3/UL (ref 0.05–0.8)
MONOCYTES NFR BLD AUTO: 8.4 %
NEUTROPHILS # BLD AUTO: 8.88 X10*3/UL (ref 1.6–5.5)
NEUTROPHILS NFR BLD AUTO: 79.8 %
NRBC BLD-RTO: 0 /100 WBCS (ref 0–0)
OSMOLALITY SERPL: 266 MOSM/KG (ref 280–300)
PHOSPHATE SERPL-MCNC: 3.5 MG/DL (ref 2.5–4.9)
PHOSPHATE SERPL-MCNC: 3.8 MG/DL (ref 2.5–4.9)
PLATELET # BLD AUTO: 145 X10*3/UL (ref 150–450)
POTASSIUM SERPL-SCNC: 4.1 MMOL/L (ref 3.5–5.3)
POTASSIUM SERPL-SCNC: 4.8 MMOL/L (ref 3.5–5.3)
PROT SERPL-MCNC: 5.6 G/DL (ref 6.4–8.2)
RBC # BLD AUTO: 3.98 X10*6/UL (ref 4.5–5.9)
RIGHT VENTRICLE PEAK SYSTOLIC PRESSURE: 32.2 MMHG
SODIUM SERPL-SCNC: 128 MMOL/L (ref 136–145)
SODIUM SERPL-SCNC: 129 MMOL/L (ref 136–145)
WBC # BLD AUTO: 11.1 X10*3/UL (ref 4.4–11.3)

## 2025-02-15 PROCEDURE — 93308 TTE F-UP OR LMTD: CPT | Performed by: STUDENT IN AN ORGANIZED HEALTH CARE EDUCATION/TRAINING PROGRAM

## 2025-02-15 PROCEDURE — 2500000001 HC RX 250 WO HCPCS SELF ADMINISTERED DRUGS (ALT 637 FOR MEDICARE OP)

## 2025-02-15 PROCEDURE — 83735 ASSAY OF MAGNESIUM: CPT

## 2025-02-15 PROCEDURE — 93321 DOPPLER ECHO F-UP/LMTD STD: CPT | Performed by: STUDENT IN AN ORGANIZED HEALTH CARE EDUCATION/TRAINING PROGRAM

## 2025-02-15 PROCEDURE — 1200000002 HC GENERAL ROOM WITH TELEMETRY DAILY

## 2025-02-15 PROCEDURE — 2500000004 HC RX 250 GENERAL PHARMACY W/ HCPCS (ALT 636 FOR OP/ED)

## 2025-02-15 PROCEDURE — S4991 NICOTINE PATCH NONLEGEND: HCPCS

## 2025-02-15 PROCEDURE — 82947 ASSAY GLUCOSE BLOOD QUANT: CPT

## 2025-02-15 PROCEDURE — 84100 ASSAY OF PHOSPHORUS: CPT

## 2025-02-15 PROCEDURE — 93321 DOPPLER ECHO F-UP/LMTD STD: CPT

## 2025-02-15 PROCEDURE — 2500000002 HC RX 250 W HCPCS SELF ADMINISTERED DRUGS (ALT 637 FOR MEDICARE OP, ALT 636 FOR OP/ED)

## 2025-02-15 PROCEDURE — 93325 DOPPLER ECHO COLOR FLOW MAPG: CPT | Performed by: STUDENT IN AN ORGANIZED HEALTH CARE EDUCATION/TRAINING PROGRAM

## 2025-02-15 PROCEDURE — 80053 COMPREHEN METABOLIC PANEL: CPT

## 2025-02-15 PROCEDURE — 83605 ASSAY OF LACTIC ACID: CPT

## 2025-02-15 PROCEDURE — 36415 COLL VENOUS BLD VENIPUNCTURE: CPT

## 2025-02-15 PROCEDURE — 80069 RENAL FUNCTION PANEL: CPT | Mod: CCI

## 2025-02-15 PROCEDURE — 99233 SBSQ HOSP IP/OBS HIGH 50: CPT | Performed by: INTERNAL MEDICINE

## 2025-02-15 PROCEDURE — 85025 COMPLETE CBC W/AUTO DIFF WBC: CPT

## 2025-02-15 PROCEDURE — 83930 ASSAY OF BLOOD OSMOLALITY: CPT

## 2025-02-15 RX ORDER — FUROSEMIDE 10 MG/ML
40 INJECTION INTRAMUSCULAR; INTRAVENOUS ONCE
Status: COMPLETED | OUTPATIENT
Start: 2025-02-15 | End: 2025-02-15

## 2025-02-15 RX ORDER — MICONAZOLE NITRATE 2 %
2 CREAM (GRAM) TOPICAL EVERY 2 HOUR PRN
Status: DISCONTINUED | OUTPATIENT
Start: 2025-02-15 | End: 2025-02-21 | Stop reason: HOSPADM

## 2025-02-15 RX ORDER — MAGNESIUM SULFATE HEPTAHYDRATE 40 MG/ML
2 INJECTION, SOLUTION INTRAVENOUS ONCE
Status: COMPLETED | OUTPATIENT
Start: 2025-02-15 | End: 2025-02-16

## 2025-02-15 RX ORDER — NICOTINE 7MG/24HR
1 PATCH, TRANSDERMAL 24 HOURS TRANSDERMAL DAILY
Status: DISCONTINUED | OUTPATIENT
Start: 2025-03-29 | End: 2025-02-21 | Stop reason: HOSPADM

## 2025-02-15 RX ORDER — AMOXICILLIN 250 MG
1 CAPSULE ORAL 2 TIMES DAILY
Status: DISCONTINUED | OUTPATIENT
Start: 2025-02-15 | End: 2025-02-16

## 2025-02-15 RX ORDER — IBUPROFEN 200 MG
1 TABLET ORAL DAILY
Status: DISCONTINUED | OUTPATIENT
Start: 2025-02-15 | End: 2025-02-21 | Stop reason: HOSPADM

## 2025-02-15 RX ORDER — POLYETHYLENE GLYCOL 3350 17 G/17G
17 POWDER, FOR SOLUTION ORAL 2 TIMES DAILY
Status: DISCONTINUED | OUTPATIENT
Start: 2025-02-15 | End: 2025-02-18

## 2025-02-15 RX ADMIN — GUAIFENESIN 600 MG: 600 TABLET ORAL at 20:32

## 2025-02-15 RX ADMIN — ASPIRIN 81 MG: 81 TABLET, COATED ORAL at 10:08

## 2025-02-15 RX ADMIN — CLOPIDOGREL BISULFATE 75 MG: 75 TABLET ORAL at 05:54

## 2025-02-15 RX ADMIN — METOPROLOL SUCCINATE 12.5 MG: 25 TABLET, EXTENDED RELEASE ORAL at 10:07

## 2025-02-15 RX ADMIN — GUAIFENESIN 600 MG: 600 TABLET ORAL at 10:07

## 2025-02-15 RX ADMIN — FUROSEMIDE 40 MG: 10 INJECTION, SOLUTION INTRAVENOUS at 10:07

## 2025-02-15 RX ADMIN — ENOXAPARIN SODIUM 40 MG: 100 INJECTION SUBCUTANEOUS at 20:32

## 2025-02-15 RX ADMIN — POLYETHYLENE GLYCOL 3350 17 G: 17 POWDER, FOR SOLUTION ORAL at 10:08

## 2025-02-15 RX ADMIN — FUROSEMIDE 40 MG: 10 INJECTION, SOLUTION INTRAVENOUS at 18:17

## 2025-02-15 RX ADMIN — SENNOSIDES AND DOCUSATE SODIUM 1 TABLET: 50; 8.6 TABLET ORAL at 20:32

## 2025-02-15 RX ADMIN — SENNOSIDES AND DOCUSATE SODIUM 1 TABLET: 50; 8.6 TABLET ORAL at 10:08

## 2025-02-15 RX ADMIN — NICOTINE 1 PATCH: 14 PATCH, EXTENDED RELEASE TRANSDERMAL at 20:31

## 2025-02-15 ASSESSMENT — COGNITIVE AND FUNCTIONAL STATUS - GENERAL
MOVING TO AND FROM BED TO CHAIR: A LITTLE
HELP NEEDED FOR BATHING: A LITTLE
TURNING FROM BACK TO SIDE WHILE IN FLAT BAD: A LITTLE
DRESSING REGULAR UPPER BODY CLOTHING: A LITTLE
CLIMB 3 TO 5 STEPS WITH RAILING: A LOT
PERSONAL GROOMING: A LITTLE
TOILETING: A LOT
TURNING FROM BACK TO SIDE WHILE IN FLAT BAD: A LITTLE
DRESSING REGULAR UPPER BODY CLOTHING: A LITTLE
STANDING UP FROM CHAIR USING ARMS: A LITTLE
DRESSING REGULAR UPPER BODY CLOTHING: A LITTLE
MOVING TO AND FROM BED TO CHAIR: A LITTLE
CLIMB 3 TO 5 STEPS WITH RAILING: A LOT
HELP NEEDED FOR BATHING: A LITTLE
CLIMB 3 TO 5 STEPS WITH RAILING: A LOT
TURNING FROM BACK TO SIDE WHILE IN FLAT BAD: A LITTLE
MOVING TO AND FROM BED TO CHAIR: A LITTLE
STANDING UP FROM CHAIR USING ARMS: A LITTLE
CLIMB 3 TO 5 STEPS WITH RAILING: A LOT
STANDING UP FROM CHAIR USING ARMS: A LITTLE
HELP NEEDED FOR BATHING: A LITTLE
MOBILITY SCORE: 17
TOILETING: A LITTLE
STANDING UP FROM CHAIR USING ARMS: A LITTLE
WALKING IN HOSPITAL ROOM: A LOT
MOBILITY SCORE: 17
DRESSING REGULAR LOWER BODY CLOTHING: A LITTLE
DAILY ACTIVITIY SCORE: 18
DRESSING REGULAR LOWER BODY CLOTHING: A LITTLE
DRESSING REGULAR LOWER BODY CLOTHING: A LITTLE
DRESSING REGULAR UPPER BODY CLOTHING: A LITTLE
WALKING IN HOSPITAL ROOM: A LOT
HELP NEEDED FOR BATHING: A LITTLE
TURNING FROM BACK TO SIDE WHILE IN FLAT BAD: A LITTLE
DAILY ACTIVITIY SCORE: 19
DRESSING REGULAR LOWER BODY CLOTHING: A LITTLE
DAILY ACTIVITIY SCORE: 19
WALKING IN HOSPITAL ROOM: A LOT
PERSONAL GROOMING: A LITTLE
PERSONAL GROOMING: A LITTLE
TOILETING: A LITTLE
MOVING TO AND FROM BED TO CHAIR: A LITTLE
TOILETING: A LITTLE
MOBILITY SCORE: 17
WALKING IN HOSPITAL ROOM: A LOT
PERSONAL GROOMING: A LITTLE

## 2025-02-15 ASSESSMENT — PAIN SCALES - GENERAL
PAINLEVEL_OUTOF10: 0 - NO PAIN

## 2025-02-15 ASSESSMENT — PAIN - FUNCTIONAL ASSESSMENT
PAIN_FUNCTIONAL_ASSESSMENT: 0-10

## 2025-02-15 NOTE — CARE PLAN
The patient's goals for the shift include Patient will be able to be on room air    The clinical goals for the shift include Patient will sit up in chair three times a day for meals    Over the shift, the patient remained on 3L/NC and was able to sit up in chair for a few hours and tolerated well.  Lasix given IVP today.  Patient had Echo today.  Patient remained safe free from falls and injury this shift.      Problem: Safety - Adult  Goal: Free from fall injury  Outcome: Progressing     Problem: Discharge Planning  Goal: Discharge to home or other facility with appropriate resources  Outcome: Progressing     Problem: Chronic Conditions and Co-morbidities  Goal: Patient's chronic conditions and co-morbidity symptoms are monitored and maintained or improved  Outcome: Progressing     Problem: Nutrition  Goal: Nutrient intake appropriate for maintaining nutritional needs  Outcome: Progressing     Problem: Fall/Injury  Goal: Not fall by end of shift  Outcome: Progressing  Goal: Be free from injury by end of the shift  Outcome: Progressing  Goal: Verbalize understanding of personal risk factors for fall in the hospital  Outcome: Progressing  Goal: Verbalize understanding of risk factor reduction measures to prevent injury from fall in the home  Outcome: Progressing  Goal: Use assistive devices by end of the shift  Outcome: Progressing  Goal: Pace activities to prevent fatigue by end of the shift  Outcome: Progressing

## 2025-02-15 NOTE — DISCHARGE INSTRUCTIONS
Dear Mr. Dakin,    You were admitted to the hospital after your cardiac catheterization on 2/11. During the placement of your coronary stent, there was tearing of the cardiac arteries which caused bleeding into the portion of your chest that surrounds the heart called the mediastinum. The cardiologists were able to place a second stent that controlled this bleeding. You had low blood pressure after the bleeding which was treated with blood transfusions and a medication called levophed that increases blood pressure. You were transferred to the cardiac ICU for close monitoring after your cardiac catheterization. You were able to be weaned off levophed on 2/12, and your blood pressure and blood counts have been stable since then.     Because your blood pressure dropped low when you were bleeding, your liver suffered an injury that caused your liver enzymes in the blood to be elevated. These numbers improved back to normal after your blood pressure was stabilized.    While in the hospital you required oxygen to maintain your blood oxygen levels. We took an x-ray of your chest that showed some extra fluid in the lungs, which we treated with an IV medication called lasix (diuretic aka water pill that helps you pee off extra fluid in the body). Your chest x-ray also showed a lung infection, which we treated with IV antibiotics. We also started you on a new medication to treat the COPD (chronic obstructive pulmonary disease) seen on the lung function tests you completed in January. This medication is an inhaler called Stiolto. As you do physical therapy at rehab to regain your strength, you may require less oxygen. If you still need oxygen at the end of your rehab stay, they will help arrange for home oxygen for you. We requested a follow-up appointment with our lung doctors (pulmonologists) to manage your COPD and monitor your oxygen needs. The pulmonologist can also monitor the lung nodule seen on your prior outpatient  imaging in January to ensure it does not grow.     We recommend quitting smoking. Quitting smoking will prevent further damage to your arteries and lungs. Quitting smoking will also reduce the risk that your aortic aneurysms grow larger or rupture. We are sending you home with nicotine patches to help you quit smoking. You can use the 14 mg dose patch daily for 6 weeks (until 3/28), then you can switch to the 7 mg dose patch for 2 weeks (until 4/11). While wearing the patch, you can also use nicotine gum every 2 hours for cigarette cravings.    If you experience chest pain, palpitations, lightheadedness, or severe shortness of breath, please come to the emergency room to be evaluated.    Medication changes:  - START taking aspirin and plavix daily to prevent narrowing of your heart stents  - START taking metoprolol 12.5 mg daily to help manage your coronary artery disease  - START using Stiolto inhaler daily to improve your lung function and treat your COPD (chronic obstructive pulmonary disease)  - START wearing a nicotine patch daily. Wear the 14 mg patch daily through 3/28. Then wear the 7 mg patch daily until 4/11. While wearing the patch, you can chew nicotine gum every 2 hours as needed.  - STOP taking felodipine and olmesartan, as your blood pressure was normal in the hospital. Your cardiologist can resume them if needed to control your blood pressure.    Follow-up appointments:  - 2/28 with Dr. Gillombardo (cardiologist)  - We requested follow-up appointments with pulmonology (lung doctors) to treat your COPD, vascular surgery to manage your aortic aneurysm, and our structural heart team to evaluate your aortic valve stenosis and regurgitation. You will be called by the scheduling service to set up these appointments. If you do not hear from them within 3 days, please call [phone] to schedule the appointments.    It was a pleasure taking care of you!    For facility:  -can work on weaning oxygen as  tolerated, currently on 2L at rest, 3L on exertion  -Zetia and statin currently held as LFTs elevated, please recheck hepatic function panel in one week and restart these medications upon normalization of LFTs    Sincerely,  Your  Medicine Team

## 2025-02-15 NOTE — CARE PLAN
The clinical goals for the shift include Patient will remain free of SOB    Over the shift, the patient did not having any SOB. Patient also sat at edge of bed and in chair for an extended period of time.    Wife at bedside throughout the night.     Problem: Safety - Adult  Goal: Free from fall injury  Outcome: Progressing     Problem: Discharge Planning  Goal: Discharge to home or other facility with appropriate resources  Outcome: Progressing     Problem: Chronic Conditions and Co-morbidities  Goal: Patient's chronic conditions and co-morbidity symptoms are monitored and maintained or improved  Outcome: Progressing     Problem: Nutrition  Goal: Nutrient intake appropriate for maintaining nutritional needs  Outcome: Progressing     Problem: Fall/Injury  Goal: Not fall by end of shift  Outcome: Progressing  Goal: Be free from injury by end of the shift  Outcome: Progressing  Goal: Verbalize understanding of personal risk factors for fall in the hospital  Outcome: Progressing  Goal: Verbalize understanding of risk factor reduction measures to prevent injury from fall in the home  Outcome: Progressing  Goal: Use assistive devices by end of the shift  Outcome: Progressing  Goal: Pace activities to prevent fatigue by end of the shift  Outcome: Progressing

## 2025-02-15 NOTE — PROGRESS NOTES
"Mickey Dakin is a 79 y.o. male on day 4 of admission presenting with Mediastinal hematoma, initial encounter.    Subjective     NAEON. Denies chest pain, SOB. States he was not having chest pain prior to cardiac cath, vascular disease was found incidentally during pre-op work-up for AAA repair. Denies pain or paresthesias in his legs. Last BM 2/10, denies abdominal pain.       Objective     Physical Exam   Gen: NAD, elderly man sitting comfortably in bed  Resp: diminished breath sounds bilaterally, no wheezes heard anteriorly, normal respiratory effort on 2L NC  CV: RRR, weak dorsalis pedis pulses b/l, capillary refill < 2 seconds bilateral feet  GI: soft, nontender, mildly distended  Neuro: antigravity all 4 extremities  MSK: 1+ pitting edema below the ankle bilaterally  Skin: scattered bruises on bilateral arms  Psych: appropriate mood and affect     Last Recorded Vitals  Blood pressure 106/66, pulse 71, temperature 36 °C (96.8 °F), temperature source Temporal, resp. rate 18, height 1.676 m (5' 6\"), weight 58.3 kg (128 lb 8.5 oz), SpO2 98%.  Intake/Output last 3 Shifts:  I/O last 3 completed shifts:  In: 960 (16.5 mL/kg) [P.O.:960]  Out: 3150 (54 mL/kg) [Urine:3150 (1.5 mL/kg/hr)]  Weight: 58.3 kg     Medications   Scheduled medications   [Held by provider] amLODIPine, 10 mg, oral, Daily  aspirin, 81 mg, oral, Daily  clopidogrel, 75 mg, oral, Daily  enoxaparin, 40 mg, subcutaneous, q24h  [Held by provider] ezetimibe, 10 mg, oral, Daily  formoterol, 20 mcg, nebulization, BID  metoprolol succinate XL, 12.5 mg, oral, Daily  polyethylene glycol, 17 g, oral, Daily  [Held by provider] rosuvastatin, 40 mg, oral, Nightly  sennosides-docusate sodium, 1 tablet, oral, Nightly  tiotropium, 2 puff, inhalation, Daily  [Held by provider] valsartan, 160 mg, oral, Daily      PRN medications   PRN medications: acetaminophen **OR** acetaminophen **OR** acetaminophen, albuterol, dextrose, dextrose, glucagon, glucagon, guaiFENesin, " ondansetron, oxygen     Recent labs  WBC   Date Value Ref Range Status   02/15/2025 11.1 4.4 - 11.3 x10*3/uL Final   02/14/2025 12.3 (H) 4.4 - 11.3 x10*3/uL Final   02/13/2025 12.1 (H) 4.4 - 11.3 x10*3/uL Final     Hemoglobin   Date Value Ref Range Status   02/15/2025 12.6 (L) 13.5 - 17.5 g/dL Final   02/14/2025 11.5 (L) 13.5 - 17.5 g/dL Final   02/13/2025 11.7 (L) 13.5 - 17.5 g/dL Final     Platelets   Date Value Ref Range Status   02/15/2025 145 (L) 150 - 450 x10*3/uL Final   02/14/2025 116 (L) 150 - 450 x10*3/uL Final   02/13/2025 116 (L) 150 - 450 x10*3/uL Final     Glucose   Date Value Ref Range Status   02/15/2025 85 74 - 99 mg/dL Final   02/14/2025 80 74 - 99 mg/dL Final   02/13/2025 92 74 - 99 mg/dL Final     Sodium   Date Value Ref Range Status   02/15/2025 129 (L) 136 - 145 mmol/L Final   02/14/2025 129 (L) 136 - 145 mmol/L Final   02/13/2025 130 (L) 136 - 145 mmol/L Final     Potassium   Date Value Ref Range Status   02/15/2025 4.8 3.5 - 5.3 mmol/L Final   02/14/2025 4.9 3.5 - 5.3 mmol/L Final   02/13/2025 4.8 3.5 - 5.3 mmol/L Final     Chloride   Date Value Ref Range Status   02/15/2025 93 (L) 98 - 107 mmol/L Final   02/14/2025 98 98 - 107 mmol/L Final   02/13/2025 99 98 - 107 mmol/L Final     Urea Nitrogen   Date Value Ref Range Status   02/15/2025 15 6 - 23 mg/dL Final   02/14/2025 16 6 - 23 mg/dL Final   02/13/2025 21 6 - 23 mg/dL Final     Creatinine   Date Value Ref Range Status   02/15/2025 0.50 0.50 - 1.30 mg/dL Final   02/14/2025 0.51 0.50 - 1.30 mg/dL Final   02/13/2025 0.56 0.50 - 1.30 mg/dL Final     Magnesium   Date Value Ref Range Status   02/15/2025 2.14 1.60 - 2.40 mg/dL Final   02/14/2025 2.17 1.60 - 2.40 mg/dL Final   02/13/2025 2.24 1.60 - 2.40 mg/dL Final     Phosphorus   Date Value Ref Range Status   02/15/2025 3.5 2.5 - 4.9 mg/dL Final     Comment:     The performance characteristics of phosphorus testing in heparinized plasma have been validated by the individual  laboratory site  where testing is performed. Testing on heparinized plasma is not approved by the FDA; however, such approval is not necessary.   02/14/2025 2.6 2.5 - 4.9 mg/dL Final     Comment:     The performance characteristics of phosphorus testing in heparinized plasma have been validated by the individual  laboratory site where testing is performed. Testing on heparinized plasma is not approved by the FDA; however, such approval is not necessary.   02/13/2025 3.1 2.5 - 4.9 mg/dL Final     Comment:     The performance characteristics of phosphorus testing in heparinized plasma have been validated by the individual  laboratory site where testing is performed. Testing on heparinized plasma is not approved by the FDA; however, such approval is not necessary.     Calcium   Date Value Ref Range Status   02/15/2025 8.5 (L) 8.6 - 10.6 mg/dL Final   02/14/2025 8.3 (L) 8.6 - 10.6 mg/dL Final   02/13/2025 8.3 (L) 8.6 - 10.6 mg/dL Final     Albumin   Date Value Ref Range Status   02/15/2025 3.4 3.4 - 5.0 g/dL Final   02/14/2025 3.2 (L) 3.4 - 5.0 g/dL Final   02/13/2025 3.3 (L) 3.4 - 5.0 g/dL Final         Imaging   XR chest 1 view    Result Date: 2/14/2025  Interpreted By:  Eligio Donahue, STUDY: XR CHEST 1 VIEW; 2/14/2025 9:01 am   INDICATION: Signs/Symptoms:Interval exam given persistent O2 requirements.   COMPARISON: 02/12/2025.   ACCESSION NUMBER(S): [id number]   ORDERING CLINICIAN: JUAN DIEGO ROBERTSON   FINDINGS:     CARDIOMEDIASTINAL SILHOUETTE: Cardiomediastinal silhouette is normal in size and configuration. Coronary artery calcifications and stents in place.   LUNGS: There is mild basilar edema and effusions. No pneumothorax. Pulmonary hyperinflation and correlate with obstructive lung disease.   ABDOMEN: No remarkable upper abdominal findings.   BONES: No acute osseous changes.       1.  There is mild right basilar edema/effusions and correlate with fluid status. Correlate with underlying obstructive lung disease.      Signed by: Eligio Donahue 2/14/2025 7:04 PM Dictation workstation:   [id number]    Vascular US Lower Extremity Venous Duplex Bilateral    Result Date: 2/14/2025            [place] [address]   Tel [phone] and Fax [phone]  Vascular Lab Report VASC US LOWER EXTREMITY VENOUS DUPLEX BILATERAL  Patient Name:     MICKEY DAKIN        Reading           [id number] [address] Lin                                       Physician:        MD Study Date:       2/14/2025           Ordering          [id number] [facility]                                       Physician:        MARCELO MRN/PID:          [id number]            Technologist:     Andrew Bronson RVT Accession#:       [id number]        Technologist 2:   Vilma Garner RVT Date of           1945 / 79      Encounter#:       [id number] Birth/Age:        years Gender:           M Admission Status: Inpatient           Location          [facility]                                       Performed:  Diagnosis/ICD: Localized (leg) edema-R60.0 CPT Codes:     76252 Peripheral venous duplex scan for DVT complete  CONCLUSIONS: Right Lower Venous: No evidence of acute deep vein thrombus visualized in the right lower extremity. Technically difficult study due to edema. Left Lower Venous: No evidence of acute deep vein thrombus visualized in the left lower extremity. Technically difficult study due to edema.  Imaging & Doppler Findings:  Right                 Compressible Thrombus   Flow Distal External Iliac     Yes        None   Pulsatile CFV                       Yes        None   Pulsatile PFV                       Yes        None FV Proximal               Yes        None   Pulsatile FV Mid                    Yes        None FV Distal                 Yes        None Popliteal                 Yes        None   Pulsatile Peroneal                  Yes        None PTV                       Yes        None  Left                   Compress Thrombus   Flow Distal External Iliac   Yes      None   Pulsatile CFV                     Yes      None   Pulsatile PFV                     Yes      None FV Proximal             Yes      None   Pulsatile FV Mid                  Yes      None FV Distal               Yes      None Popliteal               Yes      None   Pulsatile Peroneal                Yes      None PTV                     Yes      None  [id number] Tremayne Lin MD Electronically signed by [id number] Tremayne Lin MD on 2/14/2025 at 4:35:24 PM  ** Final **               Assessment/Plan   Assessment & Plan  Mediastinal hematoma, initial encounter    Mr. Dakin is a 79 y.o. 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 Placement of LCx stent was c/b retrograde left main dissection with chest pain and hypotension to SBP 50s. Cath team deployed second stent to LM to cover the lesions. He was resuscitated with 2L LR, 1 unit pRBC, 1 unit platelets, and 1 unit cryo. He was also started on levophed. He was then transferred to CICU where TTE showed no signs of tamponade and CTA showed stable mediastinal and pericardial hemorrhage. He was started on DAPT for LCx stent. He has been stable off levophed since 2/12. Post-PCI he had elevated LFTs and leukocytosis, which have been downtrending since, suspect shock liver and reactive leukocytosis. Metoprolol succinate 12.5 mg daily was started on 2/14. He continued to require 2L NC on 2/14 so CXR was obtained showing R basilar edema/effusions c/w hypervolemia, and he was diuresed with lasix 40 mg x2 with 2.6 L of urine output. No signs or symptoms of infection or acute  COPD exacerbation on exam, suspect O2 requirement 2/2 pulmonary edema. Will continue diuresis and aggressive incentive spirometry through the weekend to encourage O2 weaning.     2/15 Updates:  - increase bowel regimen to miralax BID + doc-senna 1 tab BID, last BM 2/10, abdomen soft nontender no c/f obstruction  - Asymptomatic hyponatremia, suspect 2/2 hypervolemia  - Continue diuresis with lasix 40 BID today  - Encourage incentive spirometer use and wean O2 as able  - CBC improving 11.1 > 12.6 < 145  - LFTs continue to downtrend    # Severe MVD s/p complex PCI to Lcx and LM c/b LM dissection (original plan for PCI ot LAD, Lcx, and RCA)  # Mediastinal and pericardial hemorrhage 2/2 LM dissection  # Thoracic and abdominal aortic aneurysms (3.6 cm and 5.4 cm respectively)  # Moderate aortic stenosis and regurgitation - stable per intracath TTE  # HTN  :: 12/2024 cath with 90% stenosis of Lcx and RCA, 80% stenosis of RPL, 70% stenosis of LAD, and < 10 % stenosis of LM   :: 2/11 cath with 90% stenosis of proximal Lcx and 60% stenosis of LAD s/p PCI to Lcx and LM c/b LM dissection  :: 2/12 CTA with slight improvement in mediastinal and pericardial hemorrhage  :: 2/12 TTE without signs of tamponade, EF 60-65%, mod-severe aortic valve calcification, moderate AS  :: assessed by CT surgery while in CICU, no surgical plans  -aspirin and plavix post stent  -started metoprolol 12.5 mg daily on 2/14  -holding home felodipine 10 mg and olmesartan 20 mg daily iso normotension  -holding home rosuvastatin and zetia (held due to liver injury) iso elevated LFTs  -daily CBC   [ ] plan for repeat TTE on Monday    #Severe COPD  #current smoker  :: 1/2025 PFTs with FEV1 0.59 L (24%) c/w severe airflow obstruction  - formoterol BID + tiotropium BID   - albuterol nebs PRN  - guaifenesin PRN  - Wean FiO2 to SpO2 > 92%  - Consider nicotine patch on discharge (patient declined while inpatient)     #Constipation  - last BM Monday 2/10, abdomen  soft NTND  - miralax BID  - doc-senna BID     #DVT ppx  - lovenox daily    #Dispo  - PT/OT recommending low intensity on 2/13  - Cardiology follow-up scheduled for 2/28  [ ] home PT  [ ] pulmonology follow-up  [ ] follow-up with Dr. Arias (vascular surgery) and/or Dr. Crockett (structural heart) for AAA repair/TAVR?     F: PRN   E: PRN   N: Cardiac, 2-3g Na  Access/Tubes: PIV  Antimicrobials: none  DVT prophylaxis: lovenox     Code status: DNR (confirmed on transfer to floor)  NOK: Dakin,Sally (Spouse) [phone] (Mobile)        Annalee Tucker MD  Neurology, PGY-1

## 2025-02-16 LAB
ALBUMIN SERPL BCP-MCNC: 3.3 G/DL (ref 3.4–5)
ALP SERPL-CCNC: 83 U/L (ref 33–136)
ALT SERPL W P-5'-P-CCNC: 768 U/L (ref 10–52)
ANION GAP SERPL CALC-SCNC: 9 MMOL/L (ref 10–20)
AST SERPL W P-5'-P-CCNC: 273 U/L (ref 9–39)
BACTERIA BLD CULT: NORMAL
BASOPHILS # BLD AUTO: 0.05 X10*3/UL (ref 0–0.1)
BASOPHILS NFR BLD AUTO: 0.4 %
BILIRUB SERPL-MCNC: 1.1 MG/DL (ref 0–1.2)
BUN SERPL-MCNC: 15 MG/DL (ref 6–23)
CALCIUM SERPL-MCNC: 8.4 MG/DL (ref 8.6–10.6)
CHLORIDE SERPL-SCNC: 89 MMOL/L (ref 98–107)
CHLORIDE UR-SCNC: 31 MMOL/L
CHLORIDE/CREATININE (MMOL/G) IN URINE: 31 MMOL/G CREAT (ref 23–275)
CO2 SERPL-SCNC: 33 MMOL/L (ref 21–32)
CREAT SERPL-MCNC: 0.46 MG/DL (ref 0.5–1.3)
CREAT UR-MCNC: 98.5 MG/DL (ref 20–370)
CREAT UR-MCNC: 98.5 MG/DL (ref 20–370)
EGFRCR SERPLBLD CKD-EPI 2021: >90 ML/MIN/1.73M*2
EOSINOPHIL # BLD AUTO: 0.02 X10*3/UL (ref 0–0.4)
EOSINOPHIL NFR BLD AUTO: 0.2 %
ERYTHROCYTE [DISTWIDTH] IN BLOOD BY AUTOMATED COUNT: 13.6 % (ref 11.5–14.5)
GLUCOSE BLD MANUAL STRIP-MCNC: 114 MG/DL (ref 74–99)
GLUCOSE SERPL-MCNC: 87 MG/DL (ref 74–99)
HCT VFR BLD AUTO: 39 % (ref 41–52)
HGB BLD-MCNC: 13 G/DL (ref 13.5–17.5)
IMM GRANULOCYTES # BLD AUTO: 0.09 X10*3/UL (ref 0–0.5)
IMM GRANULOCYTES NFR BLD AUTO: 0.8 % (ref 0–0.9)
LYMPHOCYTES # BLD AUTO: 1.03 X10*3/UL (ref 0.8–3)
LYMPHOCYTES NFR BLD AUTO: 8.9 %
MAGNESIUM SERPL-MCNC: 2.45 MG/DL (ref 1.6–2.4)
MCH RBC QN AUTO: 31.9 PG (ref 26–34)
MCHC RBC AUTO-ENTMCNC: 33.3 G/DL (ref 32–36)
MCV RBC AUTO: 96 FL (ref 80–100)
MONOCYTES # BLD AUTO: 1.24 X10*3/UL (ref 0.05–0.8)
MONOCYTES NFR BLD AUTO: 10.7 %
NEUTROPHILS # BLD AUTO: 9.13 X10*3/UL (ref 1.6–5.5)
NEUTROPHILS NFR BLD AUTO: 79 %
NRBC BLD-RTO: 0 /100 WBCS (ref 0–0)
OSMOLALITY UR: 598 MOSM/KG (ref 200–1200)
PHOSPHATE SERPL-MCNC: 2.9 MG/DL (ref 2.5–4.9)
PLATELET # BLD AUTO: 188 X10*3/UL (ref 150–450)
POTASSIUM SERPL-SCNC: 4.4 MMOL/L (ref 3.5–5.3)
POTASSIUM UR-SCNC: 39 MMOL/L
POTASSIUM/CREAT UR-RTO: 40 MMOL/G CREAT
PROT SERPL-MCNC: 5.5 G/DL (ref 6.4–8.2)
RBC # BLD AUTO: 4.07 X10*6/UL (ref 4.5–5.9)
SODIUM SERPL-SCNC: 127 MMOL/L (ref 136–145)
SODIUM UR-SCNC: 16 MMOL/L
SODIUM/CREAT UR-RTO: 16 MMOL/G CREAT
UREA/CREAT UR-SRTO: 10.1 G/G CREAT
UUN UR-MCNC: 995 MG/DL
WBC # BLD AUTO: 11.6 X10*3/UL (ref 4.4–11.3)

## 2025-02-16 PROCEDURE — 82570 ASSAY OF URINE CREATININE: CPT

## 2025-02-16 PROCEDURE — 2500000001 HC RX 250 WO HCPCS SELF ADMINISTERED DRUGS (ALT 637 FOR MEDICARE OP)

## 2025-02-16 PROCEDURE — S4991 NICOTINE PATCH NONLEGEND: HCPCS

## 2025-02-16 PROCEDURE — 94640 AIRWAY INHALATION TREATMENT: CPT

## 2025-02-16 PROCEDURE — 2500000002 HC RX 250 W HCPCS SELF ADMINISTERED DRUGS (ALT 637 FOR MEDICARE OP, ALT 636 FOR OP/ED)

## 2025-02-16 PROCEDURE — 85025 COMPLETE CBC W/AUTO DIFF WBC: CPT

## 2025-02-16 PROCEDURE — 83735 ASSAY OF MAGNESIUM: CPT

## 2025-02-16 PROCEDURE — 1200000002 HC GENERAL ROOM WITH TELEMETRY DAILY

## 2025-02-16 PROCEDURE — 2500000004 HC RX 250 GENERAL PHARMACY W/ HCPCS (ALT 636 FOR OP/ED)

## 2025-02-16 PROCEDURE — 83935 ASSAY OF URINE OSMOLALITY: CPT

## 2025-02-16 PROCEDURE — 36415 COLL VENOUS BLD VENIPUNCTURE: CPT

## 2025-02-16 PROCEDURE — 2500000001 HC RX 250 WO HCPCS SELF ADMINISTERED DRUGS (ALT 637 FOR MEDICARE OP): Performed by: STUDENT IN AN ORGANIZED HEALTH CARE EDUCATION/TRAINING PROGRAM

## 2025-02-16 PROCEDURE — 84100 ASSAY OF PHOSPHORUS: CPT

## 2025-02-16 PROCEDURE — 80053 COMPREHEN METABOLIC PANEL: CPT

## 2025-02-16 PROCEDURE — 99232 SBSQ HOSP IP/OBS MODERATE 35: CPT | Performed by: INTERNAL MEDICINE

## 2025-02-16 PROCEDURE — 82947 ASSAY GLUCOSE BLOOD QUANT: CPT

## 2025-02-16 RX ORDER — FUROSEMIDE 10 MG/ML
40 INJECTION INTRAMUSCULAR; INTRAVENOUS EVERY 12 HOURS
Status: DISCONTINUED | OUTPATIENT
Start: 2025-02-16 | End: 2025-02-17

## 2025-02-16 RX ORDER — LIDOCAINE 560 MG/1
1 PATCH PERCUTANEOUS; TOPICAL; TRANSDERMAL DAILY
Status: DISCONTINUED | OUTPATIENT
Start: 2025-02-16 | End: 2025-02-21 | Stop reason: HOSPADM

## 2025-02-16 RX ORDER — AMOXICILLIN 250 MG
2 CAPSULE ORAL 2 TIMES DAILY
Status: DISCONTINUED | OUTPATIENT
Start: 2025-02-16 | End: 2025-02-18

## 2025-02-16 RX ORDER — GUAIFENESIN 600 MG/1
600 TABLET, EXTENDED RELEASE ORAL 2 TIMES DAILY
Status: DISCONTINUED | OUTPATIENT
Start: 2025-02-16 | End: 2025-02-21 | Stop reason: HOSPADM

## 2025-02-16 RX ORDER — FUROSEMIDE 10 MG/ML
40 INJECTION INTRAMUSCULAR; INTRAVENOUS ONCE
Status: DISCONTINUED | OUTPATIENT
Start: 2025-02-16 | End: 2025-02-16

## 2025-02-16 RX ADMIN — POLYETHYLENE GLYCOL 3350 17 G: 17 POWDER, FOR SOLUTION ORAL at 08:03

## 2025-02-16 RX ADMIN — SALINE NASAL SPRAY 1 SPRAY: 1.5 SOLUTION NASAL at 08:03

## 2025-02-16 RX ADMIN — SENNOSIDES AND DOCUSATE SODIUM 2 TABLET: 50; 8.6 TABLET ORAL at 20:31

## 2025-02-16 RX ADMIN — SENNOSIDES AND DOCUSATE SODIUM 2 TABLET: 50; 8.6 TABLET ORAL at 08:03

## 2025-02-16 RX ADMIN — FORMOTEROL FUMARATE DIHYDRATE 20 MCG: 20 SOLUTION RESPIRATORY (INHALATION) at 21:25

## 2025-02-16 RX ADMIN — ACETAMINOPHEN 650 MG: 325 TABLET ORAL at 16:39

## 2025-02-16 RX ADMIN — FORMOTEROL FUMARATE DIHYDRATE 20 MCG: 20 SOLUTION RESPIRATORY (INHALATION) at 10:20

## 2025-02-16 RX ADMIN — TIOTROPIUM BROMIDE INHALATION SPRAY 2 PUFF: 3.12 SPRAY, METERED RESPIRATORY (INHALATION) at 10:29

## 2025-02-16 RX ADMIN — FUROSEMIDE 40 MG: 10 INJECTION, SOLUTION INTRAVENOUS at 11:13

## 2025-02-16 RX ADMIN — GUAIFENESIN 600 MG: 600 TABLET ORAL at 08:18

## 2025-02-16 RX ADMIN — ASPIRIN 81 MG: 81 TABLET, COATED ORAL at 08:03

## 2025-02-16 RX ADMIN — ACETAMINOPHEN 650 MG: 325 TABLET ORAL at 22:20

## 2025-02-16 RX ADMIN — MAGNESIUM SULFATE HEPTAHYDRATE 2 G: 40 INJECTION, SOLUTION INTRAVENOUS at 01:39

## 2025-02-16 RX ADMIN — GUAIFENESIN 600 MG: 600 TABLET ORAL at 20:31

## 2025-02-16 RX ADMIN — NICOTINE 1 PATCH: 14 PATCH, EXTENDED RELEASE TRANSDERMAL at 08:03

## 2025-02-16 RX ADMIN — METOPROLOL SUCCINATE 12.5 MG: 25 TABLET, EXTENDED RELEASE ORAL at 08:03

## 2025-02-16 RX ADMIN — ACETAMINOPHEN 650 MG: 325 TABLET ORAL at 05:53

## 2025-02-16 RX ADMIN — CLOPIDOGREL BISULFATE 75 MG: 75 TABLET ORAL at 05:53

## 2025-02-16 RX ADMIN — ENOXAPARIN SODIUM 40 MG: 100 INJECTION SUBCUTANEOUS at 20:31

## 2025-02-16 ASSESSMENT — COGNITIVE AND FUNCTIONAL STATUS - GENERAL
MOBILITY SCORE: 17
DRESSING REGULAR LOWER BODY CLOTHING: A LITTLE
MOVING TO AND FROM BED TO CHAIR: A LITTLE
MOBILITY SCORE: 17
WALKING IN HOSPITAL ROOM: A LOT
PERSONAL GROOMING: A LITTLE
DRESSING REGULAR LOWER BODY CLOTHING: A LITTLE
STANDING UP FROM CHAIR USING ARMS: A LITTLE
CLIMB 3 TO 5 STEPS WITH RAILING: A LOT
DAILY ACTIVITIY SCORE: 19
PERSONAL GROOMING: A LITTLE
STANDING UP FROM CHAIR USING ARMS: A LITTLE
CLIMB 3 TO 5 STEPS WITH RAILING: A LOT
MOVING TO AND FROM BED TO CHAIR: A LITTLE
DAILY ACTIVITIY SCORE: 19
TOILETING: A LITTLE
CLIMB 3 TO 5 STEPS WITH RAILING: A LOT
WALKING IN HOSPITAL ROOM: A LOT
MOVING TO AND FROM BED TO CHAIR: A LITTLE
MOBILITY SCORE: 17
DAILY ACTIVITIY SCORE: 19
CLIMB 3 TO 5 STEPS WITH RAILING: A LOT
WALKING IN HOSPITAL ROOM: A LOT
TOILETING: A LITTLE
MOVING TO AND FROM BED TO CHAIR: A LITTLE
TURNING FROM BACK TO SIDE WHILE IN FLAT BAD: A LITTLE
CLIMB 3 TO 5 STEPS WITH RAILING: A LOT
STANDING UP FROM CHAIR USING ARMS: A LITTLE
WALKING IN HOSPITAL ROOM: A LOT
DRESSING REGULAR UPPER BODY CLOTHING: A LITTLE
PERSONAL GROOMING: A LITTLE
DRESSING REGULAR UPPER BODY CLOTHING: A LITTLE
HELP NEEDED FOR BATHING: A LITTLE
STANDING UP FROM CHAIR USING ARMS: A LITTLE
DRESSING REGULAR UPPER BODY CLOTHING: A LITTLE
HELP NEEDED FOR BATHING: A LITTLE
TOILETING: A LOT
PERSONAL GROOMING: A LITTLE
TOILETING: A LITTLE
TOILETING: A LITTLE
WALKING IN HOSPITAL ROOM: A LOT
STANDING UP FROM CHAIR USING ARMS: A LITTLE
STANDING UP FROM CHAIR USING ARMS: A LITTLE
MOVING TO AND FROM BED TO CHAIR: A LITTLE
DRESSING REGULAR LOWER BODY CLOTHING: A LITTLE
CLIMB 3 TO 5 STEPS WITH RAILING: A LOT
DRESSING REGULAR LOWER BODY CLOTHING: A LITTLE
HELP NEEDED FOR BATHING: A LITTLE
PERSONAL GROOMING: A LITTLE
WALKING IN HOSPITAL ROOM: A LOT
WALKING IN HOSPITAL ROOM: A LOT
MOVING TO AND FROM BED TO CHAIR: A LITTLE
DAILY ACTIVITIY SCORE: 19
TURNING FROM BACK TO SIDE WHILE IN FLAT BAD: A LITTLE
MOVING TO AND FROM BED TO CHAIR: A LITTLE
PERSONAL GROOMING: A LITTLE
TOILETING: A LITTLE
HELP NEEDED FOR BATHING: A LITTLE
PERSONAL GROOMING: A LITTLE
CLIMB 3 TO 5 STEPS WITH RAILING: A LOT
HELP NEEDED FOR BATHING: A LITTLE
MOBILITY SCORE: 17
MOBILITY SCORE: 17
DAILY ACTIVITIY SCORE: 18
DRESSING REGULAR UPPER BODY CLOTHING: A LITTLE
TURNING FROM BACK TO SIDE WHILE IN FLAT BAD: A LITTLE
DRESSING REGULAR LOWER BODY CLOTHING: A LITTLE
WALKING IN HOSPITAL ROOM: A LOT
TURNING FROM BACK TO SIDE WHILE IN FLAT BAD: A LITTLE
DAILY ACTIVITIY SCORE: 18
TURNING FROM BACK TO SIDE WHILE IN FLAT BAD: A LITTLE
PERSONAL GROOMING: A LITTLE
HELP NEEDED FOR BATHING: A LITTLE
HELP NEEDED FOR BATHING: A LITTLE
TOILETING: A LITTLE
HELP NEEDED FOR BATHING: A LITTLE
DRESSING REGULAR LOWER BODY CLOTHING: A LITTLE
TURNING FROM BACK TO SIDE WHILE IN FLAT BAD: A LITTLE
TURNING FROM BACK TO SIDE WHILE IN FLAT BAD: A LITTLE
TOILETING: A LOT
STANDING UP FROM CHAIR USING ARMS: A LITTLE
DRESSING REGULAR LOWER BODY CLOTHING: A LITTLE
TURNING FROM BACK TO SIDE WHILE IN FLAT BAD: A LITTLE
DRESSING REGULAR UPPER BODY CLOTHING: A LITTLE
MOVING TO AND FROM BED TO CHAIR: A LITTLE
DRESSING REGULAR UPPER BODY CLOTHING: A LITTLE
MOBILITY SCORE: 17
CLIMB 3 TO 5 STEPS WITH RAILING: A LOT
DRESSING REGULAR LOWER BODY CLOTHING: A LITTLE
STANDING UP FROM CHAIR USING ARMS: A LITTLE

## 2025-02-16 ASSESSMENT — PAIN - FUNCTIONAL ASSESSMENT
PAIN_FUNCTIONAL_ASSESSMENT: 0-10

## 2025-02-16 ASSESSMENT — PAIN DESCRIPTION - ORIENTATION
ORIENTATION: RIGHT
ORIENTATION: RIGHT

## 2025-02-16 ASSESSMENT — PAIN SCALES - GENERAL
PAINLEVEL_OUTOF10: 0 - NO PAIN
PAINLEVEL_OUTOF10: 3
PAINLEVEL_OUTOF10: 0 - NO PAIN
PAINLEVEL_OUTOF10: 0 - NO PAIN
PAINLEVEL_OUTOF10: 4
PAINLEVEL_OUTOF10: 0 - NO PAIN
PAINLEVEL_OUTOF10: 1
PAINLEVEL_OUTOF10: 0 - NO PAIN

## 2025-02-16 ASSESSMENT — PAIN DESCRIPTION - LOCATION
LOCATION: KNEE
LOCATION: KNEE

## 2025-02-16 ASSESSMENT — ACTIVITIES OF DAILY LIVING (ADL): LACK_OF_TRANSPORTATION: NO

## 2025-02-16 NOTE — PROGRESS NOTES
02/16/25 1044   Discharge Planning   Living Arrangements Spouse/significant other   Support Systems Spouse/significant other;Children   Assistance Needed none   Type of Residence Private residence   Number of Stairs to Enter Residence 3   Number of Stairs Within Residence 0   Do you have animals or pets at home? No   Home or Post Acute Services None   Expected Discharge Disposition Home   Does the patient need discharge transport arranged? No   Financial Resource Strain   How hard is it for you to pay for the very basics like food, housing, medical care, and heating? Not very   Housing Stability   In the last 12 months, was there a time when you were not able to pay the mortgage or rent on time? N   In the past 12 months, how many times have you moved where you were living? 1   At any time in the past 12 months, were you homeless or living in a shelter (including now)? N   Transportation Needs   In the past 12 months, has lack of transportation kept you from medical appointments or from getting medications? no   In the past 12 months, has lack of transportation kept you from meetings, work, or from getting things needed for daily living? No   Patient Choice   Provider Choice list and CMS website (https://medicare.gov/care-compare#search) for post-acute Quality and Resource Measure Data were provided and reviewed with: Patient   Patient / Family choosing to utilize agency / facility established prior to hospitalization No   Stroke Family Assessment   Stroke Family Assessment Needed No     Transitional Care Coordination Progress Note:  Patient discussed during interdisciplinary rounds.   Team members present: YAMILA BERMUDEZ MD   Plan per Medical/Surgical team: Monitoring for hyponatremia   Payor: [employer]   Discharge disposition: Home   Potential Barriers: None   ADOD: 2-      Previous Home Care: None   DME: None   Pharmacy: [facility]  Falls: None   PCP:  KAYLEE STEVENS [  Dialysis:  None

## 2025-02-16 NOTE — SIGNIFICANT EVENT
Rapid Response Nurse Note: RADAR alert: 6    Pager time: [phone]  Arrival time: 445  Event end time: 453  Location: [facility]  [x] Triage by phone or secure messaging    Rapid response initiated by:  [] Rapid response RN [] Family [] Nursing Supervisor [] Physician   [x] RADAR auto page [] Sepsis auto-page [] RN [] RT   [] NP/PA [] Other:     Primary reason for call:   [] BAT [] New CPAP/BiPAP [] Bleeding [] Change in mental status   [] Chest pain [] Code blue [] FiO2 >/= 50% [] HR </= 40 bpm   [] HR >/= 130 bpm [] Hyperglycemia [] Hypoglycemia [x] RADAR    [] RR </= 8 bpm [] RR >/= 30 bpm [] SBP </= 90 mmHg [] SpO2 < 90%   [] Seizure [] Sepsis [] Shortness of breath  [] Staff concern: see comments     Initial VS and/or RADAR VS: T 36.3 °C; HR 78; RR 20; BP 94/54; SPO2 93%.    Interventions:  [x] None [] ABG/VBG [] Assist w/ICU transfer [] BAT paged    [] Bag mask [] Blood [] Cardioversion [] Code Blue   [] Code blue for intubation [] Code status changed [] Chest x-ray [] EKG   [] IV fluid/bolus [] KUB x-ray [] Labs/cultures [] Medication   [] Nebulizer treatment [] NIPPV (CPAP/BiPAP) [] Oxygen [] Oral airway   [] Peripheral IV [] Palliative care consult [] CT/MRI [] Sepsis protocol    [] Suctioned [] Other:     Outcome:  [] Coded and  [] Code blue for intubation [] Coded and transferred to ICU []  on division   [x] Remained on division (no change) [] Remained on division + additional monitoring [] Remained in ED [] Transferred to ED   [] Transferred to ICU [] Transferred to inpatient status [] Transferred for interventions (procedure) [] Transferred to ICU stepdown    [] Transferred to surgery [] Transferred to telemetry [] Sepsis protocol [] STEMI protocol   [] Stroke protocol [x] Bedside nurse instructed to page rapid response for any concerns or acute change in condition/VS     Additional C.omments: Reviewed above RADAR VS with bedside RN via phone.  Vital signs within patient's current trends.  No  acute change in condition.  No interventions by rapid response team indicated at this time.  Staff to page rapid response for any concerns or acute change in condition or VS.

## 2025-02-16 NOTE — CARE PLAN
The patient's goals for the shift include Patient will be able to be on room air    The clinical goals for the shift include Patient will sit up in chair three times a day for meals    Over the shift, the patient remained on 2L/NC and sat up in chair for meals.  He reports minimal right knee pain hot packs applied and Tylenol given with some relief.  Patient remained safe free from falls and injury this shift.    Note:  PT/OT to evaluate patient for rec tomorrow, possible rehab per wife.      Problem: Safety - Adult  Goal: Free from fall injury  Outcome: Progressing     Problem: Discharge Planning  Goal: Discharge to home or other facility with appropriate resources  Outcome: Progressing     Problem: Chronic Conditions and Co-morbidities  Goal: Patient's chronic conditions and co-morbidity symptoms are monitored and maintained or improved  Outcome: Progressing     Problem: Nutrition  Goal: Nutrient intake appropriate for maintaining nutritional needs  Outcome: Progressing     Problem: Fall/Injury  Goal: Not fall by end of shift  Outcome: Progressing  Goal: Be free from injury by end of the shift  Outcome: Progressing  Goal: Verbalize understanding of personal risk factors for fall in the hospital  Outcome: Progressing  Goal: Verbalize understanding of risk factor reduction measures to prevent injury from fall in the home  Outcome: Progressing  Goal: Use assistive devices by end of the shift  Outcome: Progressing  Goal: Pace activities to prevent fatigue by end of the shift  Outcome: Progressing

## 2025-02-16 NOTE — SIGNIFICANT EVENT
Rapid Response Nurse Note:  [x] RADAR alert/Score 6    Pager time:   Arrival time:   Event end time:   Location:  5027  [x] Phone triage     Rapid response initiated by:  [] Rapid Response RN [] Family [] Nursing Supervisor [] Physician   [x] RADAR auto-page [] Sepsis auto-page [] RN [] RT   [] NP/PA [] Other:     Primary reason for call:   [] BAT [] New CPAP/BiPAP [] Bleeding [] Change in mental status   [] Chest pain [] Code blue [] FiO2 >/= 50% [] HR </= 40 bpm   [] HR >/= 130 bpm [] Hyperglycemia [] Hypoglycemia [x] RADAR    [] RR </= 8 bpm [] RR >/= 30 bpm [] SBP </= 90 mmHg [] SpO2 < 90%   [] Seizure [] Sepsis [] Staff concern:     Initial VS and/or RADAR VS:  radar vitals below in bold    Vitals:    02/15/25 0555 02/15/25 0726 02/15/25 1452 02/15/25 2100   BP: 98/63 106/66 110/66 100/66   BP Location: Right arm Right arm Right arm Right arm   Patient Position: Lying Lying Lying Lying   Pulse: 68 71 74 77   Resp: 18 18 18 19   Temp: 36.5 °C (97.7 °F) 36 °C (96.8 °F) 36 °C (96.8 °F) 36.3 °C (97.3 °F)   TempSrc: Temporal Temporal Temporal Temporal   SpO2: 94% 98% 97% 93%   Weight: 58.3 kg (128 lb 8.5 oz)      Height:            Interventions:  [x] None [] ABG [] Assist w/ICU transfer [] BAT paged    [] Bag mask [] Blood [] Cardioversion [] Code Blue   [] Code blue for intubation [] Code status changed [] Chest x-ray [] EKG   [] IV fluid/bolus [] KUB x-ray [] Labs/cultures [] Medication   [] Nebulizer treatment [] NIPPV (CPAP/BiPAP) [] Oxygen [] Oral airway   [] Peripheral IV [] Palliative care consult [] CT/MRI [] Sepsis protocol    [] Suctioned [] Other:       Outcome:  [] Coded and  [] Code blue for intubation [] Coded and transferred to ICU []  on division   [x] Remained on division (no change) [] Remained on division + additional monitoring [] Remained in ED [] Transferred to ED   [] Transferred to ICU [] Transferred to inpatient status [] Transferred for interventions (procedure)  [] Transferred to ICU stepdown    [] Transferred to surgery [] Transferred to telemetry [] Sepsis protocol [] STEMI protocol   [] Stroke protocol [x] Bedside nurse instructed to page rapid response for any concerns or acute change in condition/VS     Additional Comments: Spoke to RN regarding vital signs.  No concerns from RN at this time.

## 2025-02-16 NOTE — PROGRESS NOTES
"Mickey Dakin is a 79 y.o. male on day 5 of admission presenting with Mediastinal hematoma, initial encounter.    Subjective     NAEON. States he didn't sleep well. Denies chest pain, lightheadedness. States his SOB is stable, worsens when he is \"rushed\" or nervous. Endorses chronic R knee pain and low appetite. No BM yesterday, denies abdominal pain       Objective     Physical Exam   Gen: NAD, elderly man sitting up in chair  HEENT: JVD above the level of clavicle  Resp: diminished breath sounds bilaterally, no wheezes/rales, normal respiratory effort on 2L NC, pursed lip breathing with some accessory muscle use  CV: RRR, no murmurs  GI: soft, nontender, mildly distended  MSK: No edema BLE  Skin: scattered bruises on bilateral arms  Psych: appropriate mood and affect     Last Recorded Vitals  Blood pressure (!) 93/46, pulse 71, temperature 36.3 °C (97.3 °F), temperature source Temporal, resp. rate 17, height 1.676 m (5' 6\"), weight 57.4 kg (126 lb 8 oz), SpO2 96%.  Intake/Output last 3 Shifts:  I/O last 3 completed shifts:  In: 720 (12.5 mL/kg) [P.O.:720]  Out: 2450 (42.7 mL/kg) [Urine:2450 (1.2 mL/kg/hr)]  Weight: 57.4 kg     Medications   Scheduled medications   [Held by provider] amLODIPine, 10 mg, oral, Daily  aspirin, 81 mg, oral, Daily  clopidogrel, 75 mg, oral, Daily  enoxaparin, 40 mg, subcutaneous, q24h  [Held by provider] ezetimibe, 10 mg, oral, Daily  formoterol, 20 mcg, nebulization, BID  guaiFENesin, 600 mg, oral, BID  lidocaine, 1 patch, transdermal, Daily  metoprolol succinate XL, 12.5 mg, oral, Daily  nicotine, 1 patch, transdermal, Daily   Followed by  [START ON 3/29/2025] nicotine, 1 patch, transdermal, Daily  polyethylene glycol, 17 g, oral, BID  [Held by provider] rosuvastatin, 40 mg, oral, Nightly  sennosides-docusate sodium, 2 tablet, oral, BID  tiotropium, 2 puff, inhalation, Daily  [Held by provider] valsartan, 160 mg, oral, Daily      PRN medications   PRN medications: acetaminophen **OR** " acetaminophen **OR** acetaminophen, albuterol, dextrose, dextrose, glucagon, glucagon, nicotine polacrilex, ondansetron, oxygen, sodium chloride     Recent labs  WBC   Date Value Ref Range Status   02/15/2025 11.1 4.4 - 11.3 x10*3/uL Final   02/14/2025 12.3 (H) 4.4 - 11.3 x10*3/uL Final   02/13/2025 12.1 (H) 4.4 - 11.3 x10*3/uL Final     Hemoglobin   Date Value Ref Range Status   02/15/2025 12.6 (L) 13.5 - 17.5 g/dL Final   02/14/2025 11.5 (L) 13.5 - 17.5 g/dL Final   02/13/2025 11.7 (L) 13.5 - 17.5 g/dL Final     Platelets   Date Value Ref Range Status   02/15/2025 145 (L) 150 - 450 x10*3/uL Final   02/14/2025 116 (L) 150 - 450 x10*3/uL Final   02/13/2025 116 (L) 150 - 450 x10*3/uL Final     Glucose   Date Value Ref Range Status   02/15/2025 146 (H) 74 - 99 mg/dL Final   02/15/2025 85 74 - 99 mg/dL Final   02/14/2025 80 74 - 99 mg/dL Final     Sodium   Date Value Ref Range Status   02/15/2025 128 (L) 136 - 145 mmol/L Final   02/15/2025 129 (L) 136 - 145 mmol/L Final   02/14/2025 129 (L) 136 - 145 mmol/L Final     Potassium   Date Value Ref Range Status   02/15/2025 4.1 3.5 - 5.3 mmol/L Final   02/15/2025 4.8 3.5 - 5.3 mmol/L Final   02/14/2025 4.9 3.5 - 5.3 mmol/L Final     Chloride   Date Value Ref Range Status   02/15/2025 88 (L) 98 - 107 mmol/L Final   02/15/2025 93 (L) 98 - 107 mmol/L Final   02/14/2025 98 98 - 107 mmol/L Final     Urea Nitrogen   Date Value Ref Range Status   02/15/2025 16 6 - 23 mg/dL Final   02/15/2025 15 6 - 23 mg/dL Final   02/14/2025 16 6 - 23 mg/dL Final     Creatinine   Date Value Ref Range Status   02/15/2025 0.55 0.50 - 1.30 mg/dL Final   02/15/2025 0.50 0.50 - 1.30 mg/dL Final   02/14/2025 0.51 0.50 - 1.30 mg/dL Final     Magnesium   Date Value Ref Range Status   02/15/2025 1.90 1.60 - 2.40 mg/dL Final   02/15/2025 2.14 1.60 - 2.40 mg/dL Final   02/14/2025 2.17 1.60 - 2.40 mg/dL Final     Phosphorus   Date Value Ref Range Status   02/15/2025 3.8 2.5 - 4.9 mg/dL Final     Comment:      The performance characteristics of phosphorus testing in heparinized plasma have been validated by the individual  laboratory site where testing is performed. Testing on heparinized plasma is not approved by the FDA; however, such approval is not necessary.   02/15/2025 3.5 2.5 - 4.9 mg/dL Final     Comment:     The performance characteristics of phosphorus testing in heparinized plasma have been validated by the individual  laboratory site where testing is performed. Testing on heparinized plasma is not approved by the FDA; however, such approval is not necessary.   02/14/2025 2.6 2.5 - 4.9 mg/dL Final     Comment:     The performance characteristics of phosphorus testing in heparinized plasma have been validated by the individual  laboratory site where testing is performed. Testing on heparinized plasma is not approved by the FDA; however, such approval is not necessary.     Calcium   Date Value Ref Range Status   02/15/2025 8.4 (L) 8.6 - 10.6 mg/dL Final   02/15/2025 8.5 (L) 8.6 - 10.6 mg/dL Final   02/14/2025 8.3 (L) 8.6 - 10.6 mg/dL Final     Albumin   Date Value Ref Range Status   02/15/2025 3.4 3.4 - 5.0 g/dL Final   02/15/2025 3.4 3.4 - 5.0 g/dL Final   02/14/2025 3.2 (L) 3.4 - 5.0 g/dL Final         Imaging   Transthoracic Echo (TTE) Limited    Result Date: 2/15/2025   [facility], [address] [place] [address]                Tel [phone] and Fax [phone] TRANSTHORACIC ECHOCARDIOGRAM REPORT  Patient Name:       MICKEY DAKIN        Paolo Physician:    [id number] Lorne Carroll MD Study Date:         2/15/2025           Ordering Provider:    [id number] ROSIO CHIU MRN/PID:            [id number]            Fellow: Accession#:         [id number]        Nurse: Date of Birth/Age:  1945 / 79      Sonographer:          Stephanie Miller [facility]                      years Gender assigned at  M                   Additional Staff: Birth: Height:             167.64 cm           Admit Date:           2/11/2025 Weight:             58.06 kg            Admission Status:     Inpatient -                                                               Routine BSA / BMI:          1.65 m2 / 20.66     Encounter#:           [id number]                     kg/m2 Blood Pressure:     106/66 mmHg         Department Location:  [facility]                                                               Non Invasive Study Type:    TRANSTHORACIC ECHO (TTE) LIMITED Diagnosis/ICD: Other pericardial effusion (noninflammatory)-I31.39 Indication:    Mediastinal hemorrhage after PCI, r/o pericardial effusion CPT Code:      Echo Limited-08534; Color Doppler-18393; Doppler Limited-52538 Patient History: Valve Disorders:   Aortic Stenosis. Pertinent History: CAD, Chest Pain and COPD. Mediastinal hematoma. Study Detail: The following Echo studies were performed: 2D, M-Mode, Doppler and               color flow. Unable to obtain suprasternal notch view.  PHYSICIAN INTERPRETATION: Left Ventricle: Left ventricular ejection fraction is normal, by visual estimate at 60-65%. There are no regional left ventricular wall motion abnormalities. The left ventricular cavity size is normal. There is normal septal and normal posterior left ventricular wall thickness. There is left ventricular concentric remodeling. Left ventricular diastolic filling was not assessed. Left Atrium: The left atrial size was not assessed. Right Ventricle: The right ventricle is normal in size. There is normal right ventricular global systolic function. Right Atrium: The right atrial size was not assessed. Aortic Valve: The aortic valve was not assessed. Aortic valve regurgitation was not assessed. Mitral Valve: The mitral valve is mildly thickened. There is moderate mitral annular calcification. Mitral valve regurgitation was not assessed.  Tricuspid Valve: The tricuspid valve is structurally normal. There is trace to mild tricuspid regurgitation. The Doppler estimated RVSP is slightly elevated at 32.2 mmHg. Pulmonic Valve: The pulmonic valve was not assessed. Pulmonic valve regurgitation was not assessed. Pericardium: Small pericardial effusion anterior to the right ventricle. There is no evidence of cardiac tamponade. Aorta: The aortic root was not assessed. Systemic Veins: The inferior vena cava appears normal in size, with IVC inspiratory collapse greater than 50%. In comparison to the previous echocardiogram(s): Compared with study dated 2/12/2024, no significant change.  CONCLUSIONS:  1. Left ventricular ejection fraction is normal, by visual estimate at 60-65%.  2. There is normal right ventricular global systolic function.  3. Small anteriorly located pericardial effusino without tamponade.  4. There is no evidence of cardiac tamponade.  5. Valvular funciton was not assessed on this study. QUANTITATIVE DATA SUMMARY:  2D MEASUREMENTS:          Normal Ranges: LAs:             3.00 cm  (2.7-4.0cm) IVSd:            0.90 cm  (0.6-1.1cm) LVPWd:           0.90 cm  (0.6-1.1cm) LVIDd:           3.90 cm  (3.9-5.9cm) LVIDs:           2.80 cm LV Mass Index:   64 g/m2 LVEDV Index:     43 ml/m2 LV % FS          28.2 %  LEFT ATRIUM:                 Normal Ranges: LA Vol A4C:        19.1 ml   (22+/-6mL/m2) LA Vol Index A4C:  11.5ml/m2 LA Area A4C:       9.8 cm2 LA Major Axis A4C: 4.3 cm  RIGHT ATRIUM:         Normal Ranges: RA Area A4C:  6.8 cm2  LV SYSTOLIC FUNCTION:                      Normal Ranges: EF-A4C View:    59 % (>=55%) EF-A2C View:    67 % EF-Biplane:     64 % EF-Visual:      63 % LV EF Reported: 63 %  RIGHT VENTRICLE: RV Basal 2.56 cm RV Mid   1.21 cm RV Major 6.5 cm  TRICUSPID VALVE/RVSP:          Normal Ranges: Peak TR Velocity:     2.70 m/s RV Syst Pressure:     32 mmHg  (< 30mmHg) IVC Diam:             2.03 cm  [id number] Lorne Carroll MD  Electronically signed on 2/15/2025 at 12:41:38 PM  ** Final **     XR chest 1 view    Result Date: 2/14/2025  Interpreted By:  Eligio Donahue, STUDY: XR CHEST 1 VIEW; 2/14/2025 9:01 am   INDICATION: Signs/Symptoms:Interval exam given persistent O2 requirements.   COMPARISON: 02/12/2025.   ACCESSION NUMBER(S): [id number]   ORDERING CLINICIAN: JUAN DIEGO ROBERTSON   FINDINGS:     CARDIOMEDIASTINAL SILHOUETTE: Cardiomediastinal silhouette is normal in size and configuration. Coronary artery calcifications and stents in place.   LUNGS: There is mild basilar edema and effusions. No pneumothorax. Pulmonary hyperinflation and correlate with obstructive lung disease.   ABDOMEN: No remarkable upper abdominal findings.   BONES: No acute osseous changes.       1.  There is mild right basilar edema/effusions and correlate with fluid status. Correlate with underlying obstructive lung disease.     Signed by: Eligio Donahue 2/14/2025 7:04 PM Dictation workstation:   [id number]    Vascular US Lower Extremity Venous Duplex Bilateral    Result Date: 2/14/2025            [place] [address]   Tel [phone] and Fax [phone]  Vascular Lab Report VASC US LOWER EXTREMITY VENOUS DUPLEX BILATERAL  Patient Name:     MICKEY DAKIN        Reading           [id number] Tremayne Lin                                       Physician:        MD Study Date:       2/14/2025           Ordering          [id number] JUAN DIEGO                                       Physician:        MARCELO MRN/PID:          [id number]            Technologist:     Andrew Bronson RVT Accession#:       [id number]        Technologist 2:   Vilma Garner RVT Date of           1945 / 79      Encounter#:       [id number] Birth/Age:        years Gender:           M Admission Status: Inpatient           Location          [facility]                                       Performed:  Diagnosis/ICD: Localized (leg)  edema-R60.0 CPT Codes:     62053 Peripheral venous duplex scan for DVT complete  CONCLUSIONS: Right Lower Venous: No evidence of acute deep vein thrombus visualized in the right lower extremity. Technically difficult study due to edema. Left Lower Venous: No evidence of acute deep vein thrombus visualized in the left lower extremity. Technically difficult study due to edema.  Imaging & Doppler Findings:  Right                 Compressible Thrombus   Flow Distal External Iliac     Yes        None   Pulsatile CFV                       Yes        None   Pulsatile PFV                       Yes        None FV Proximal               Yes        None   Pulsatile FV Mid                    Yes        None FV Distal                 Yes        None Popliteal                 Yes        None   Pulsatile Peroneal                  Yes        None PTV                       Yes        None  Left                  Compress Thrombus   Flow Distal External Iliac   Yes      None   Pulsatile CFV                     Yes      None   Pulsatile PFV                     Yes      None FV Proximal             Yes      None   Pulsatile FV Mid                  Yes      None FV Distal               Yes      None Popliteal               Yes      None   Pulsatile Peroneal                Yes      None PTV                     Yes      None  [id number] Tremayne Lin MD Electronically signed by [id number] Tremayne Lin MD on 2/14/2025 at 4:35:24 PM  ** Final **               Assessment/Plan   Assessment & Plan  Mediastinal hematoma, initial encounter    Other emphysema (Multi)    Moderate aortic stenosis    Mr. Dakin is a 79 y.o. 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 Placement of LCx stent was c/b retrograde left main dissection with chest pain and hypotension to SBP 50s. Cath team deployed second stent to LM to cover the lesions. He was resuscitated with 2L LR, 1 unit pRBC, 1 unit platelets, and 1 unit cryo. He was also started on levophed. He was then transferred to CICU where TTE showed no signs of tamponade and CTA showed stable mediastinal and pericardial hemorrhage. He was started on DAPT for LCx stent. He has been stable off levophed since 2/12. Post-PCI he had elevated LFTs and leukocytosis, which have been downtrending since, suspect shock liver and reactive leukocytosis. Metoprolol succinate 12.5 mg daily was started on 2/14. He continued to require 2L NC on 2/14 so CXR was obtained showing R basilar edema/effusions c/w hypervolemia, and he was diuresed with lasix 40 mg x2 with 2.6 L of urine output. No signs or symptoms of infection or acute COPD exacerbation on exam, suspect O2 requirement 2/2 pulmonary edema. Will continue diuresis and aggressive incentive spirometry through the weekend to encourage O2 weaning.     2/16 Updates:  - TTE ordered for 2/17 done yesterday - stable compared to 2/12 TTE, EF 60-65%, normal RV function, small anterior pericardial effusion without tamponade  - JVD just above the clavicle today, will continue diuresis with IV lasix 40 mg BID today  - Worsening hyponatremia this admission, will obtain urine studies prior to 1st lasix dose today  - increase bowel regimen to miralax BID + doc-senna 2 tab BID, last BM 2/10, abdomen soft nontender no c/f obstruction  - will schedule guaifenesin BID per patient request for cough  - trial lidocaine patch for R knee pain  - start nicotine patch and gum for smoking cessation  - Encourage incentive spirometer use and wean O2 as able  - CBC improving 11.6 > 13 < 188, mild leukocytosis today, no signs infection on exam, will continue to monitor with  daily CBC  - LFTs continue to downtrend     # Severe MVD s/p complex PCI to Lcx and LM c/b LM dissection (original plan for PCI ot LAD, Lcx, and RCA)  # Mediastinal and pericardial hemorrhage 2/2 LM dissection  # Thoracic and abdominal aortic aneurysms (3.6 cm and 5.4 cm respectively)  # Moderate aortic stenosis and regurgitation - stable per intracath TTE  # HTN  :: 12/2024 cath with 90% stenosis of Lcx and RCA, 80% stenosis of RPL, 70% stenosis of LAD, and < 10 % stenosis of LM   :: 2/11 cath with 90% stenosis of proximal Lcx and 60% stenosis of LAD s/p PCI to Lcx and LM c/b LM dissection  :: 2/12 CTA with slight improvement in mediastinal and pericardial hemorrhage  :: 2/12 TTE without signs of tamponade, EF 60-65%, mod-severe aortic valve calcification, moderate AS  :: 2/15 TTE EF 60-65%, normal RV function, small anterior pericardial effusion without tamponade, valvular function not assessed  :: assessed by CT surgery while in CICU, no surgical plans  -aspirin and plavix post stent  -started metoprolol 12.5 mg daily on 2/14  -holding home felodipine 10 mg and olmesartan 20 mg daily iso normotension  -holding home rosuvastatin and zetia (held due to liver injury) iso elevated LFTs  -daily CBC   [ ] repeat TTE done 2/15, will discuss with interventional team whether additional TTE needed prior to discharge    #Hyponatremia  - unclear etiology, suspect may be 2/2 hypervolemia  [ ] will obtain urine studies prior to 1st lasix dose this AM    #Severe COPD  #current smoker  :: 1/2025 PFTs with FEV1 0.59 L (24%) c/w severe airflow obstruction  - formoterol BID + tiotropium BID   - albuterol nebs PRN  - guaifenesin PRN  - Wean FiO2 to SpO2 > 92%  - nicotine patch + gum for smoking cessation, plan to continue on discharge     #Constipation  - last BM Monday 2/10, abdomen soft NTND  - miralax BID  - doc-senna 2 tab BID  [ ] consider mag citrate or suppository on Monday 2/17 if no BM     #DVT ppx  - lovenox daily      #Dispo  [ ] PT/OT recommending low intensity on 2/13, will request repeat assessment once respiratory status stabilizes  - Cardiology follow-up scheduled for 2/28  [ ] pulmonology follow-up  [ ] follow-up with Dr. Arias (vascular surgery) and/or Dr. Crockett (structural heart) for AAA repair/TAVR?     F: PRN   E: PRN   N: Cardiac, 2-3g Na  Access/Tubes: PIV  Antimicrobials: none  DVT prophylaxis: lovenox     Code status: DNR / ok for intubation or ICU (confirmed on transfer to floor)  NOK: Dakin,Sally (Spouse) [phone] (Mobile)        Annalee Tucker MD  Neurology, PGY-1

## 2025-02-16 NOTE — CARE PLAN
Problem: Fall/Injury  Goal: Be free from injury by end of the shift  Outcome: Progressing     Problem: Chronic Conditions and Co-morbidities  Goal: Patient's chronic conditions and co-morbidity symptoms are monitored and maintained or improved  Outcome: Progressing         The clinical goals for the shift include Pt's BP and HR will remain WNL throughout shift.

## 2025-02-16 NOTE — SIGNIFICANT EVENT
Confirmed via epic chat with day team Resident Annalee Tucker that patients code status isDNR/ OK for Escalation.     Goyo De Dios MD   PGY- 1

## 2025-02-17 ENCOUNTER — APPOINTMENT (OUTPATIENT)
Dept: RADIOLOGY | Facility: HOSPITAL | Age: 80
End: 2025-02-17
Payer: MEDICARE

## 2025-02-17 LAB
ALBUMIN SERPL BCP-MCNC: 3.1 G/DL (ref 3.4–5)
ALP SERPL-CCNC: 81 U/L (ref 33–136)
ALT SERPL W P-5'-P-CCNC: 547 U/L (ref 10–52)
ANION GAP SERPL CALC-SCNC: 9 MMOL/L (ref 10–20)
AST SERPL W P-5'-P-CCNC: 137 U/L (ref 9–39)
BASOPHILS # BLD AUTO: 0.05 X10*3/UL (ref 0–0.1)
BASOPHILS NFR BLD AUTO: 0.3 %
BILIRUB DIRECT SERPL-MCNC: 0.1 MG/DL (ref 0–0.3)
BILIRUB SERPL-MCNC: 1.3 MG/DL (ref 0–1.2)
BUN SERPL-MCNC: 13 MG/DL (ref 6–23)
CALCIUM SERPL-MCNC: 8.3 MG/DL (ref 8.6–10.6)
CHLORIDE SERPL-SCNC: 88 MMOL/L (ref 98–107)
CO2 SERPL-SCNC: 35 MMOL/L (ref 21–32)
CREAT SERPL-MCNC: 0.41 MG/DL (ref 0.5–1.3)
EGFRCR SERPLBLD CKD-EPI 2021: >90 ML/MIN/1.73M*2
EOSINOPHIL # BLD AUTO: 0.06 X10*3/UL (ref 0–0.4)
EOSINOPHIL NFR BLD AUTO: 0.4 %
ERYTHROCYTE [DISTWIDTH] IN BLOOD BY AUTOMATED COUNT: 13.5 % (ref 11.5–14.5)
FLUAV RNA RESP QL NAA+PROBE: NOT DETECTED
FLUBV RNA RESP QL NAA+PROBE: NOT DETECTED
GLUCOSE SERPL-MCNC: 70 MG/DL (ref 74–99)
HCT VFR BLD AUTO: 38.8 % (ref 41–52)
HGB BLD-MCNC: 12.5 G/DL (ref 13.5–17.5)
IMM GRANULOCYTES # BLD AUTO: 0.07 X10*3/UL (ref 0–0.5)
IMM GRANULOCYTES NFR BLD AUTO: 0.5 % (ref 0–0.9)
LYMPHOCYTES # BLD AUTO: 1.17 X10*3/UL (ref 0.8–3)
LYMPHOCYTES NFR BLD AUTO: 8 %
MAGNESIUM SERPL-MCNC: 2.11 MG/DL (ref 1.6–2.4)
MCH RBC QN AUTO: 31.7 PG (ref 26–34)
MCHC RBC AUTO-ENTMCNC: 32.2 G/DL (ref 32–36)
MCV RBC AUTO: 99 FL (ref 80–100)
MONOCYTES # BLD AUTO: 1.09 X10*3/UL (ref 0.05–0.8)
MONOCYTES NFR BLD AUTO: 7.5 %
NEUTROPHILS # BLD AUTO: 12.19 X10*3/UL (ref 1.6–5.5)
NEUTROPHILS NFR BLD AUTO: 83.3 %
NRBC BLD-RTO: 0 /100 WBCS (ref 0–0)
PHOSPHATE SERPL-MCNC: 2.5 MG/DL (ref 2.5–4.9)
PLATELET # BLD AUTO: 188 X10*3/UL (ref 150–450)
POTASSIUM SERPL-SCNC: 4.2 MMOL/L (ref 3.5–5.3)
PROT SERPL-MCNC: 5.2 G/DL (ref 6.4–8.2)
RBC # BLD AUTO: 3.94 X10*6/UL (ref 4.5–5.9)
RSV RNA RESP QL NAA+PROBE: NOT DETECTED
SARS-COV-2 RNA RESP QL NAA+PROBE: NOT DETECTED
SODIUM SERPL-SCNC: 128 MMOL/L (ref 136–145)
WBC # BLD AUTO: 14.6 X10*3/UL (ref 4.4–11.3)

## 2025-02-17 PROCEDURE — 1200000002 HC GENERAL ROOM WITH TELEMETRY DAILY

## 2025-02-17 PROCEDURE — 2500000001 HC RX 250 WO HCPCS SELF ADMINISTERED DRUGS (ALT 637 FOR MEDICARE OP)

## 2025-02-17 PROCEDURE — 84100 ASSAY OF PHOSPHORUS: CPT

## 2025-02-17 PROCEDURE — 99232 SBSQ HOSP IP/OBS MODERATE 35: CPT | Performed by: INTERNAL MEDICINE

## 2025-02-17 PROCEDURE — 87637 SARSCOV2&INF A&B&RSV AMP PRB: CPT

## 2025-02-17 PROCEDURE — 2500000005 HC RX 250 GENERAL PHARMACY W/O HCPCS

## 2025-02-17 PROCEDURE — 87205 SMEAR GRAM STAIN: CPT

## 2025-02-17 PROCEDURE — 2500000002 HC RX 250 W HCPCS SELF ADMINISTERED DRUGS (ALT 637 FOR MEDICARE OP, ALT 636 FOR OP/ED)

## 2025-02-17 PROCEDURE — 80053 COMPREHEN METABOLIC PANEL: CPT

## 2025-02-17 PROCEDURE — 83735 ASSAY OF MAGNESIUM: CPT

## 2025-02-17 PROCEDURE — 71045 X-RAY EXAM CHEST 1 VIEW: CPT

## 2025-02-17 PROCEDURE — 82248 BILIRUBIN DIRECT: CPT

## 2025-02-17 PROCEDURE — 85025 COMPLETE CBC W/AUTO DIFF WBC: CPT

## 2025-02-17 PROCEDURE — 2500000004 HC RX 250 GENERAL PHARMACY W/ HCPCS (ALT 636 FOR OP/ED)

## 2025-02-17 PROCEDURE — 36415 COLL VENOUS BLD VENIPUNCTURE: CPT

## 2025-02-17 PROCEDURE — 2500000001 HC RX 250 WO HCPCS SELF ADMINISTERED DRUGS (ALT 637 FOR MEDICARE OP): Performed by: STUDENT IN AN ORGANIZED HEALTH CARE EDUCATION/TRAINING PROGRAM

## 2025-02-17 PROCEDURE — S4991 NICOTINE PATCH NONLEGEND: HCPCS

## 2025-02-17 PROCEDURE — 71045 X-RAY EXAM CHEST 1 VIEW: CPT | Performed by: RADIOLOGY

## 2025-02-17 RX ORDER — BISACODYL 10 MG/1
10 SUPPOSITORY RECTAL ONCE
Status: COMPLETED | OUTPATIENT
Start: 2025-02-17 | End: 2025-02-17

## 2025-02-17 RX ORDER — AZITHROMYCIN 500 MG/1
500 TABLET, FILM COATED ORAL
Status: COMPLETED | OUTPATIENT
Start: 2025-02-17 | End: 2025-02-19

## 2025-02-17 RX ORDER — CEFTRIAXONE 1 G/50ML
1 INJECTION, SOLUTION INTRAVENOUS EVERY 24 HOURS
Status: DISCONTINUED | OUTPATIENT
Start: 2025-02-17 | End: 2025-02-21

## 2025-02-17 RX ADMIN — NICOTINE 1 PATCH: 14 PATCH, EXTENDED RELEASE TRANSDERMAL at 08:25

## 2025-02-17 RX ADMIN — POLYETHYLENE GLYCOL 3350 17 G: 17 POWDER, FOR SOLUTION ORAL at 08:23

## 2025-02-17 RX ADMIN — GUAIFENESIN 600 MG: 600 TABLET ORAL at 08:23

## 2025-02-17 RX ADMIN — TIOTROPIUM BROMIDE INHALATION SPRAY 2 PUFF: 3.12 SPRAY, METERED RESPIRATORY (INHALATION) at 08:26

## 2025-02-17 RX ADMIN — AZITHROMYCIN DIHYDRATE 500 MG: 500 TABLET, FILM COATED ORAL at 14:58

## 2025-02-17 RX ADMIN — GUAIFENESIN 600 MG: 600 TABLET ORAL at 20:03

## 2025-02-17 RX ADMIN — CLOPIDOGREL BISULFATE 75 MG: 75 TABLET ORAL at 06:01

## 2025-02-17 RX ADMIN — BISACODYL 10 MG: 10 SUPPOSITORY RECTAL at 08:23

## 2025-02-17 RX ADMIN — CEFTRIAXONE SODIUM 1 G: 1 INJECTION, SOLUTION INTRAVENOUS at 14:57

## 2025-02-17 RX ADMIN — LIDOCAINE 4% 1 PATCH: 40 PATCH TOPICAL at 08:22

## 2025-02-17 RX ADMIN — ACETAMINOPHEN 650 MG: 325 TABLET ORAL at 20:03

## 2025-02-17 RX ADMIN — ASPIRIN 81 MG: 81 TABLET, COATED ORAL at 08:23

## 2025-02-17 RX ADMIN — SENNOSIDES AND DOCUSATE SODIUM 2 TABLET: 50; 8.6 TABLET ORAL at 08:23

## 2025-02-17 RX ADMIN — ENOXAPARIN SODIUM 40 MG: 100 INJECTION SUBCUTANEOUS at 20:04

## 2025-02-17 RX ADMIN — METOPROLOL SUCCINATE 12.5 MG: 25 TABLET, EXTENDED RELEASE ORAL at 08:23

## 2025-02-17 ASSESSMENT — COGNITIVE AND FUNCTIONAL STATUS - GENERAL
PERSONAL GROOMING: A LITTLE
CLIMB 3 TO 5 STEPS WITH RAILING: A LOT
WALKING IN HOSPITAL ROOM: A LOT
PERSONAL GROOMING: A LITTLE
TOILETING: A LITTLE
MOVING TO AND FROM BED TO CHAIR: A LITTLE
TOILETING: A LITTLE
TURNING FROM BACK TO SIDE WHILE IN FLAT BAD: A LITTLE
PERSONAL GROOMING: A LITTLE
MOBILITY SCORE: 17
DRESSING REGULAR LOWER BODY CLOTHING: A LITTLE
DRESSING REGULAR LOWER BODY CLOTHING: A LITTLE
WALKING IN HOSPITAL ROOM: A LOT
DAILY ACTIVITIY SCORE: 19
TURNING FROM BACK TO SIDE WHILE IN FLAT BAD: A LITTLE
DAILY ACTIVITIY SCORE: 19
STANDING UP FROM CHAIR USING ARMS: A LITTLE
STANDING UP FROM CHAIR USING ARMS: A LITTLE
WALKING IN HOSPITAL ROOM: A LOT
DRESSING REGULAR UPPER BODY CLOTHING: A LITTLE
STANDING UP FROM CHAIR USING ARMS: A LITTLE
WALKING IN HOSPITAL ROOM: A LOT
HELP NEEDED FOR BATHING: A LITTLE
CLIMB 3 TO 5 STEPS WITH RAILING: A LOT
DAILY ACTIVITIY SCORE: 19
HELP NEEDED FOR BATHING: A LITTLE
STANDING UP FROM CHAIR USING ARMS: A LITTLE
DAILY ACTIVITIY SCORE: 19
TURNING FROM BACK TO SIDE WHILE IN FLAT BAD: A LITTLE
TOILETING: A LITTLE
HELP NEEDED FOR BATHING: A LITTLE
WALKING IN HOSPITAL ROOM: A LOT
MOBILITY SCORE: 17
TURNING FROM BACK TO SIDE WHILE IN FLAT BAD: A LITTLE
MOBILITY SCORE: 17
HELP NEEDED FOR BATHING: A LITTLE
DRESSING REGULAR UPPER BODY CLOTHING: A LITTLE
TOILETING: A LITTLE
DRESSING REGULAR LOWER BODY CLOTHING: A LITTLE
STANDING UP FROM CHAIR USING ARMS: A LITTLE
TURNING FROM BACK TO SIDE WHILE IN FLAT BAD: A LITTLE
DRESSING REGULAR UPPER BODY CLOTHING: A LITTLE
DRESSING REGULAR LOWER BODY CLOTHING: A LITTLE
DRESSING REGULAR UPPER BODY CLOTHING: A LITTLE
STANDING UP FROM CHAIR USING ARMS: A LITTLE
CLIMB 3 TO 5 STEPS WITH RAILING: A LOT
WALKING IN HOSPITAL ROOM: A LOT
DAILY ACTIVITIY SCORE: 19
MOBILITY SCORE: 17
WALKING IN HOSPITAL ROOM: A LOT
DRESSING REGULAR LOWER BODY CLOTHING: A LITTLE
PERSONAL GROOMING: A LITTLE
DAILY ACTIVITIY SCORE: 19
DRESSING REGULAR LOWER BODY CLOTHING: A LITTLE
DRESSING REGULAR UPPER BODY CLOTHING: A LITTLE
CLIMB 3 TO 5 STEPS WITH RAILING: A LOT
HELP NEEDED FOR BATHING: A LITTLE
MOVING TO AND FROM BED TO CHAIR: A LITTLE
MOBILITY SCORE: 17
CLIMB 3 TO 5 STEPS WITH RAILING: A LOT
HELP NEEDED FOR BATHING: A LITTLE
PERSONAL GROOMING: A LITTLE
DRESSING REGULAR UPPER BODY CLOTHING: A LITTLE
MOVING TO AND FROM BED TO CHAIR: A LITTLE
PERSONAL GROOMING: A LITTLE
TOILETING: A LITTLE
PERSONAL GROOMING: A LITTLE
MOVING TO AND FROM BED TO CHAIR: A LITTLE
TURNING FROM BACK TO SIDE WHILE IN FLAT BAD: A LITTLE
MOVING TO AND FROM BED TO CHAIR: A LITTLE
TOILETING: A LITTLE
TURNING FROM BACK TO SIDE WHILE IN FLAT BAD: A LITTLE
MOBILITY SCORE: 17
HELP NEEDED FOR BATHING: A LITTLE
CLIMB 3 TO 5 STEPS WITH RAILING: A LOT
MOVING TO AND FROM BED TO CHAIR: A LITTLE
DRESSING REGULAR UPPER BODY CLOTHING: A LITTLE
MOVING TO AND FROM BED TO CHAIR: A LITTLE
MOBILITY SCORE: 17
DRESSING REGULAR LOWER BODY CLOTHING: A LITTLE
DAILY ACTIVITIY SCORE: 19
STANDING UP FROM CHAIR USING ARMS: A LITTLE
TOILETING: A LITTLE
CLIMB 3 TO 5 STEPS WITH RAILING: A LOT

## 2025-02-17 ASSESSMENT — COLUMBIA-SUICIDE SEVERITY RATING SCALE - C-SSRS
6. HAVE YOU EVER DONE ANYTHING, STARTED TO DO ANYTHING, OR PREPARED TO DO ANYTHING TO END YOUR LIFE?: NO
1. SINCE LAST CONTACT, HAVE YOU WISHED YOU WERE DEAD OR WISHED YOU COULD GO TO SLEEP AND NOT WAKE UP?: NO
2. HAVE YOU ACTUALLY HAD ANY THOUGHTS OF KILLING YOURSELF?: NO

## 2025-02-17 ASSESSMENT — PAIN SCALES - GENERAL
PAINLEVEL_OUTOF10: 4
PAINLEVEL_OUTOF10: 0 - NO PAIN

## 2025-02-17 ASSESSMENT — PAIN - FUNCTIONAL ASSESSMENT
PAIN_FUNCTIONAL_ASSESSMENT: 0-10
PAIN_FUNCTIONAL_ASSESSMENT: 0-10

## 2025-02-17 NOTE — CARE PLAN
Problem: Fall/Injury  Goal: Be free from injury by end of the shift  Outcome: Progressing     Problem: Chronic Conditions and Co-morbidities  Goal: Patient's chronic conditions and co-morbidity symptoms are monitored and maintained or improved  Outcome: Progressing       The clinical goals for the shift include Pt's pulse ox will remain >91% throughout shift.

## 2025-02-17 NOTE — CARE PLAN
Problem: Safety - Adult  Goal: Free from fall injury  Outcome: Progressing     Problem: Discharge Planning  Goal: Discharge to home or other facility with appropriate resources  Outcome: Progressing     Problem: Chronic Conditions and Co-morbidities  Goal: Patient's chronic conditions and co-morbidity symptoms are monitored and maintained or improved  Outcome: Progressing     Problem: Nutrition  Goal: Nutrient intake appropriate for maintaining nutritional needs  Outcome: Progressing     Problem: Fall/Injury  Goal: Not fall by end of shift  Outcome: Progressing  Goal: Be free from injury by end of the shift  Outcome: Progressing  Goal: Verbalize understanding of personal risk factors for fall in the hospital  Outcome: Progressing  Goal: Verbalize understanding of risk factor reduction measures to prevent injury from fall in the home  Outcome: Progressing  Goal: Use assistive devices by end of the shift  Outcome: Progressing  Goal: Pace activities to prevent fatigue by end of the shift  Outcome: Progressing

## 2025-02-17 NOTE — SIGNIFICANT EVENT
Rapid Response Nurse Note: RADAR alert: Score = 6    Pager time:   Arrival time:   Event end time:   Location: [place] [address]  [x] Triage by phone or secure messaging    Rapid response initiated by:  [] Rapid response RN [] Family [] Nursing Supervisor [] Physician   [x] RADAR auto page [] Sepsis auto-page [] RN [] RT   [] NP/PA [] Other:     Primary reason for call:   [] BAT [] New CPAP/BiPAP [] Bleeding [] Change in mental status   [] Chest pain [] Code blue [] FiO2 >/= 50% [] HR </= 40 bpm   [] HR >/= 130 bpm [] Hyperglycemia [] Hypoglycemia [x] RADAR    [] RR </= 8 bpm [] RR >/= 30 bpm [] SBP </= 90 mmHg [] SpO2 < 90%   [] Seizure [] Sepsis [] Shortness of breath  [] Staff concern: see comments     Initial VS and/or RADAR VS: T 37 °C; HR 95; RR 18; /59; SPO2 90%.    Interventions:  [x] None [] ABG/VBG [] Assist w/ICU transfer [] BAT paged    [] Bag mask [] Blood [] Cardioversion [] Code Blue   [] Code blue for intubation [] Code status changed [] Chest x-ray [] EKG   [] IV fluid/bolus [] KUB x-ray [] Labs/cultures [] Medication   [] Nebulizer treatment [] NIPPV (CPAP/BiPAP) [] Oxygen [] Oral airway   [] Peripheral IV [] Palliative care consult [] CT/MRI [] Sepsis protocol    [] Suctioned [] Other:     Outcome:  [] Coded and  [] Code blue for intubation [] Coded and transferred to ICU []  on division   [x] Remained on division (no change) [] Remained on division + additional monitoring [] Remained in ED [] Transferred to ED   [] Transferred to ICU [] Transferred to inpatient status [] Transferred for interventions (procedure) [] Transferred to ICU stepdown    [] Transferred to surgery [] Transferred to telemetry [] Sepsis protocol [] STEMI protocol   [] Stroke protocol [x] Bedside nurse instructed to page rapid response for any concerns or acute change in condition/VS     Additional Comments: RADAR auto page received for above vitals. Per bedside RN, no acute changes or concerns at  this time.   Please page rapid response for any concerns for deterioration or assistance needed.

## 2025-02-17 NOTE — PROGRESS NOTES
"Mickey Dakin is a 79 y.o. male on day 6 of admission presenting with Mediastinal hematoma, initial encounter.    Subjective     Patient had softer BP (80s/40s) yesterday afternoon, asymptomatic, negative SIRS criteria. Second dose of IV lasix was held. Denies chest pain, SOB, abdominal pain. Endorses cough, intermittently productive of scant sputum. Last BM Monday 2/10.        Objective     Physical Exam   Gen: NAD, elderly man sitting in bed comfortably  HEENT: no JVD  Resp: diminished breath sounds bilaterally, no wheezes/crackles, normal respiratory effort on 2L NC, pursed lip breathing with minimal accessory muscle use  CV: RRR, no murmurs  GI: soft, nontender, mildly distended  MSK: 1+ edema to mid-shin bilaterally  Skin: scattered bruises on bilateral arms  Psych: appropriate mood and affect     Last Recorded Vitals  Blood pressure 112/69, pulse 66, temperature 36.4 °C (97.5 °F), temperature source Temporal, resp. rate 20, height 1.676 m (5' 6\"), weight 57.1 kg (125 lb 12.8 oz), SpO2 99%.  Intake/Output last 3 Shifts:  I/O last 3 completed shifts:  In: 1660 (29.1 mL/kg) [P.O.:1660]  Out: 1650 (28.9 mL/kg) [Urine:1650 (0.8 mL/kg/hr)]  Weight: 57.1 kg     Medications   Scheduled medications   [Held by provider] amLODIPine, 10 mg, oral, Daily  aspirin, 81 mg, oral, Daily  bisacodyl, 10 mg, rectal, Once  clopidogrel, 75 mg, oral, Daily  enoxaparin, 40 mg, subcutaneous, q24h  [Held by provider] ezetimibe, 10 mg, oral, Daily  formoterol, 20 mcg, nebulization, BID  [Held by provider] furosemide, 40 mg, intravenous, q12h  guaiFENesin, 600 mg, oral, BID  lidocaine, 1 patch, transdermal, Daily  metoprolol succinate XL, 12.5 mg, oral, Daily  nicotine, 1 patch, transdermal, Daily   Followed by  [START ON 3/29/2025] nicotine, 1 patch, transdermal, Daily  polyethylene glycol, 17 g, oral, BID  [Held by provider] rosuvastatin, 40 mg, oral, Nightly  sennosides-docusate sodium, 2 tablet, oral, BID  tiotropium, 2 puff, " inhalation, Daily  [Held by provider] valsartan, 160 mg, oral, Daily      PRN medications   PRN medications: acetaminophen **OR** acetaminophen **OR** acetaminophen, albuterol, dextrose, dextrose, glucagon, glucagon, nicotine polacrilex, ondansetron, oxygen, sodium chloride     Recent labs  WBC   Date Value Ref Range Status   02/16/2025 11.6 (H) 4.4 - 11.3 x10*3/uL Final   02/15/2025 11.1 4.4 - 11.3 x10*3/uL Final   02/14/2025 12.3 (H) 4.4 - 11.3 x10*3/uL Final     Hemoglobin   Date Value Ref Range Status   02/16/2025 13.0 (L) 13.5 - 17.5 g/dL Final   02/15/2025 12.6 (L) 13.5 - 17.5 g/dL Final   02/14/2025 11.5 (L) 13.5 - 17.5 g/dL Final     Platelets   Date Value Ref Range Status   02/16/2025 188 150 - 450 x10*3/uL Final   02/15/2025 145 (L) 150 - 450 x10*3/uL Final   02/14/2025 116 (L) 150 - 450 x10*3/uL Final     Glucose   Date Value Ref Range Status   02/16/2025 87 74 - 99 mg/dL Final   02/15/2025 146 (H) 74 - 99 mg/dL Final   02/15/2025 85 74 - 99 mg/dL Final     Sodium   Date Value Ref Range Status   02/16/2025 127 (L) 136 - 145 mmol/L Final   02/15/2025 128 (L) 136 - 145 mmol/L Final   02/15/2025 129 (L) 136 - 145 mmol/L Final     Potassium   Date Value Ref Range Status   02/16/2025 4.4 3.5 - 5.3 mmol/L Final   02/15/2025 4.1 3.5 - 5.3 mmol/L Final   02/15/2025 4.8 3.5 - 5.3 mmol/L Final     Chloride   Date Value Ref Range Status   02/16/2025 89 (L) 98 - 107 mmol/L Final   02/15/2025 88 (L) 98 - 107 mmol/L Final   02/15/2025 93 (L) 98 - 107 mmol/L Final     Urea Nitrogen   Date Value Ref Range Status   02/16/2025 15 6 - 23 mg/dL Final   02/15/2025 16 6 - 23 mg/dL Final   02/15/2025 15 6 - 23 mg/dL Final     Creatinine   Date Value Ref Range Status   02/16/2025 0.46 (L) 0.50 - 1.30 mg/dL Final   02/15/2025 0.55 0.50 - 1.30 mg/dL Final   02/15/2025 0.50 0.50 - 1.30 mg/dL Final     Magnesium   Date Value Ref Range Status   02/16/2025 2.45 (H) 1.60 - 2.40 mg/dL Final   02/15/2025 1.90 1.60 - 2.40 mg/dL Final    02/15/2025 2.14 1.60 - 2.40 mg/dL Final     Phosphorus   Date Value Ref Range Status   02/16/2025 2.9 2.5 - 4.9 mg/dL Final     Comment:     The performance characteristics of phosphorus testing in heparinized plasma have been validated by the individual  laboratory site where testing is performed. Testing on heparinized plasma is not approved by the FDA; however, such approval is not necessary.   02/15/2025 3.8 2.5 - 4.9 mg/dL Final     Comment:     The performance characteristics of phosphorus testing in heparinized plasma have been validated by the individual  laboratory site where testing is performed. Testing on heparinized plasma is not approved by the FDA; however, such approval is not necessary.   02/15/2025 3.5 2.5 - 4.9 mg/dL Final     Comment:     The performance characteristics of phosphorus testing in heparinized plasma have been validated by the individual  laboratory site where testing is performed. Testing on heparinized plasma is not approved by the FDA; however, such approval is not necessary.     Calcium   Date Value Ref Range Status   02/16/2025 8.4 (L) 8.6 - 10.6 mg/dL Final   02/15/2025 8.4 (L) 8.6 - 10.6 mg/dL Final   02/15/2025 8.5 (L) 8.6 - 10.6 mg/dL Final     Albumin   Date Value Ref Range Status   02/16/2025 3.3 (L) 3.4 - 5.0 g/dL Final   02/15/2025 3.4 3.4 - 5.0 g/dL Final   02/15/2025 3.4 3.4 - 5.0 g/dL Final         Imaging   Transthoracic Echo (TTE) Limited    Result Date: 2/15/2025   [facility], [address] [place] [address]                Tel [phone] and Fax [phone] TRANSTHORACIC ECHOCARDIOGRAM REPORT  Patient Name:       MICKEY DAKIN Reading Physician:    [id number] Lorne Carroll MD Study Date:         2/15/2025           Ordering Provider:    [id number] ROSIO CHIU MRN/PID:            [id number]             Fellow: Accession#:         [id number]        Nurse: Date of Birth/Age:  1945 / 79      Sonographer:          Stephanie Miller RDCS                     years Gender assigned at  M                   Additional Staff: Birth: Height:             167.64 cm           Admit Date:           2/11/2025 Weight:             58.06 kg            Admission Status:     Inpatient -                                                               Routine BSA / BMI:          1.65 m2 / 20.66     Encounter#:           [id number]                     kg/m2 Blood Pressure:     106/66 mmHg         Department Location:  [facility]                                                               Non Invasive Study Type:    TRANSTHORACIC ECHO (TTE) LIMITED Diagnosis/ICD: Other pericardial effusion (noninflammatory)-I31.39 Indication:    Mediastinal hemorrhage after PCI, r/o pericardial effusion CPT Code:      Echo Limited-04209; Color Doppler-15235; Doppler Limited-31263 Patient History: Valve Disorders:   Aortic Stenosis. Pertinent History: CAD, Chest Pain and COPD. Mediastinal hematoma. Study Detail: The following Echo studies were performed: 2D, M-Mode, Doppler and               color flow. Unable to obtain suprasternal notch view.  PHYSICIAN INTERPRETATION: Left Ventricle: Left ventricular ejection fraction is normal, by visual estimate at 60-65%. There are no regional left ventricular wall motion abnormalities. The left ventricular cavity size is normal. There is normal septal and normal posterior left ventricular wall thickness. There is left ventricular concentric remodeling. Left ventricular diastolic filling was not assessed. Left Atrium: The left atrial size was not assessed. Right Ventricle: The right ventricle is normal in size. There is normal right ventricular global systolic function. Right Atrium: The right atrial size was not assessed. Aortic Valve: The aortic valve was not assessed. Aortic valve regurgitation was not  assessed. Mitral Valve: The mitral valve is mildly thickened. There is moderate mitral annular calcification. Mitral valve regurgitation was not assessed. Tricuspid Valve: The tricuspid valve is structurally normal. There is trace to mild tricuspid regurgitation. The Doppler estimated RVSP is slightly elevated at 32.2 mmHg. Pulmonic Valve: The pulmonic valve was not assessed. Pulmonic valve regurgitation was not assessed. Pericardium: Small pericardial effusion anterior to the right ventricle. There is no evidence of cardiac tamponade. Aorta: The aortic root was not assessed. Systemic Veins: The inferior vena cava appears normal in size, with IVC inspiratory collapse greater than 50%. In comparison to the previous echocardiogram(s): Compared with study dated 2/12/2024, no significant change.  CONCLUSIONS:  1. Left ventricular ejection fraction is normal, by visual estimate at 60-65%.  2. There is normal right ventricular global systolic function.  3. Small anteriorly located pericardial effusino without tamponade.  4. There is no evidence of cardiac tamponade.  5. Valvular funciton was not assessed on this study. QUANTITATIVE DATA SUMMARY:  2D MEASUREMENTS:          Normal Ranges: LAs:             3.00 cm  (2.7-4.0cm) IVSd:            0.90 cm  (0.6-1.1cm) LVPWd:           0.90 cm  (0.6-1.1cm) LVIDd:           3.90 cm  (3.9-5.9cm) LVIDs:           2.80 cm LV Mass Index:   64 g/m2 LVEDV Index:     43 ml/m2 LV % FS          28.2 %  LEFT ATRIUM:                 Normal Ranges: LA Vol A4C:        19.1 ml   (22+/-6mL/m2) LA Vol Index A4C:  11.5ml/m2 LA Area A4C:       9.8 cm2 LA Major Axis A4C: 4.3 cm  RIGHT ATRIUM:         Normal Ranges: RA Area A4C:  6.8 cm2  LV SYSTOLIC FUNCTION:                      Normal Ranges: EF-A4C View:    59 % (>=55%) EF-A2C View:    67 % EF-Biplane:     64 % EF-Visual:      63 % LV EF Reported: 63 %  RIGHT VENTRICLE: RV Basal 2.56 cm RV Mid   1.21 cm RV Major 6.5 cm  TRICUSPID VALVE/RVSP:           Normal Ranges: Peak TR Velocity:     2.70 m/s RV Syst Pressure:     32 mmHg  (< 30mmHg) IVC Diam:             2.03 cm  [id number] Lorne Carroll MD Electronically signed on 2/15/2025 at 12:41:38 PM  ** Final **               Assessment/Plan   Assessment & Plan  Mediastinal hematoma, initial encounter    Other emphysema (Multi)    Moderate aortic stenosis    Mr. Dakin is a 79 y.o. 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 Placement of LCx stent was c/b retrograde left main dissection with chest pain and hypotension to SBP 50s. Cath team deployed second stent to LM to cover the lesions. He was resuscitated with 2L LR, 1 unit pRBC, 1 unit platelets, and 1 unit cryo. He was also started on levophed. He was then transferred to CICU where TTE showed no signs of tamponade and CTA showed stable mediastinal and pericardial hemorrhage. He was started on DAPT for LCx stent. He has been stable off levophed since 2/12. Post-PCI he had elevated LFTs and leukocytosis, which have been downtrending since, suspect shock liver and reactive leukocytosis. Metoprolol succinate 12.5 mg daily was started on 2/14. He continued to require 2L NC on 2/14 so CXR was obtained showing R basilar edema/effusions c/w hypervolemia, and he was diuresed with lasix 40 mg x2 with 2.6 L of urine output. No signs or symptoms of infection or acute COPD exacerbation on exam, suspect O2 requirement 2/2 pulmonary edema. Continued diuresis through 2/17 when patient appeared euvolemic with softer BP. Will repeat CXR to assess continued oxygen requirement of 2L NC.    2/17 Updates:  - suppository ordered for  constipation  - repeat CXR today to assess continued O2 requirement of 2L NC  - elevated WBC, asymptomatic, will start with CXR, plan to send broad infectious work-up if decompensation or becomes SIRS +  - euvolemic on exam, will hold diuresis today  - Encourage incentive spirometer use and wean O2 as able  - indirect hyperbilirubinemia, suspect 2/2 hematoma resorption  - plan for PT re-assessment today  - Will discuss with interventional cardiology whether additional TTE needed prior ot discharge    # Severe MVD s/p complex PCI to Lcx and LM c/b LM dissection (original plan for PCI ot LAD, Lcx, and RCA)  # Mediastinal and pericardial hemorrhage 2/2 LM dissection  # Thoracic and abdominal aortic aneurysms (3.6 cm and 5.4 cm respectively)  # Moderate aortic stenosis and regurgitation - stable per intracath TTE  # HTN  :: 12/2024 cath with 90% stenosis of Lcx and RCA, 80% stenosis of RPL, 70% stenosis of LAD, and < 10 % stenosis of LM   :: 2/11 cath with 90% stenosis of proximal Lcx and 60% stenosis of LAD s/p PCI to Lcx and LM c/b LM dissection  :: 2/12 CTA with slight improvement in mediastinal and pericardial hemorrhage  :: 2/12 TTE without signs of tamponade, EF 60-65%, mod-severe aortic valve calcification, moderate AS  :: 2/15 TTE EF 60-65%, normal RV function, small anterior pericardial effusion without tamponade, valvular function not assessed  :: assessed by CT surgery while in CICU, no surgical plans  -aspirin and plavix post stent  -started metoprolol 12.5 mg daily on 2/14  -holding home felodipine 10 mg and olmesartan 20 mg daily iso normotension  -holding home rosuvastatin and zetia (held due to liver injury) iso elevated LFTs  -daily CBC   [ ] repeat TTE done 2/15, will discuss with interventional team whether additional TTE needed prior to discharge     #Hyponatremia  - unclear etiology, suspect may be 2/2 hypervolemia  - Sosm 266 L, Uosm 598 H, Lorena 16 L c/w hypovolemic vs dilutional, suspect  hypovolemic given soft BP and euvolemia on exam    #Severe COPD  #current smoker  :: 1/2025 PFTs with FEV1 0.59 L (24%) c/w severe airflow obstruction  - formoterol BID + tiotropium BID   - albuterol nebs PRN  - guaifenesin PRN  - Wean FiO2 to SpO2 > 92%  - nicotine patch + gum for smoking cessation, plan to continue on discharge     #Constipation  - last BM Monday 2/10, abdomen soft NTND  - miralax BID  - doc-senna 2 tab BID  - dulocolax suppository on 2/17     #DVT ppx  - lovenox daily     #Dispo  [ ] PT/OT recommending low intensity on 2/13, will request repeat assessment once respiratory status stabilizes  - Cardiology follow-up scheduled for 2/28  [ ] pulmonology follow-up  [ ] follow-up with Dr. Arias (vascular surgery) and/or Dr. Crockett (structural heart) for AAA repair/TAVR?     F: PRN   E: PRN   N: Cardiac, 2-3g Na  Access/Tubes: PIV  Antimicrobials: none  DVT prophylaxis: lovenox     Code status: DNR / ok for intubation or ICU (confirmed on transfer to floor)  NOK: Dakin,Sally (Spouse) [phone] (Mobile)        Annalee Tucker MD  Neurology, PGY-1

## 2025-02-18 ENCOUNTER — APPOINTMENT (OUTPATIENT)
Dept: CARDIOLOGY | Facility: HOSPITAL | Age: 80
End: 2025-02-18
Payer: MEDICARE

## 2025-02-18 LAB
ALBUMIN SERPL BCP-MCNC: 3.1 G/DL (ref 3.4–5)
ALP SERPL-CCNC: 85 U/L (ref 33–136)
ALT SERPL W P-5'-P-CCNC: 382 U/L (ref 10–52)
ANION GAP SERPL CALC-SCNC: 11 MMOL/L (ref 10–20)
AST SERPL W P-5'-P-CCNC: 69 U/L (ref 9–39)
BACTERIA SPEC RESP CULT: ABNORMAL
BASOPHILS # BLD AUTO: 0.09 X10*3/UL (ref 0–0.1)
BASOPHILS NFR BLD AUTO: 0.7 %
BILIRUB SERPL-MCNC: 1.1 MG/DL (ref 0–1.2)
BUN SERPL-MCNC: 10 MG/DL (ref 6–23)
CALCIUM SERPL-MCNC: 8.6 MG/DL (ref 8.6–10.6)
CHLORIDE SERPL-SCNC: 88 MMOL/L (ref 98–107)
CO2 SERPL-SCNC: 34 MMOL/L (ref 21–32)
CREAT SERPL-MCNC: 0.41 MG/DL (ref 0.5–1.3)
EGFRCR SERPLBLD CKD-EPI 2021: >90 ML/MIN/1.73M*2
EJECTION FRACTION APICAL 4 CHAMBER: 60.8
EJECTION FRACTION: 68 %
EOSINOPHIL # BLD AUTO: 0.06 X10*3/UL (ref 0–0.4)
EOSINOPHIL NFR BLD AUTO: 0.4 %
ERYTHROCYTE [DISTWIDTH] IN BLOOD BY AUTOMATED COUNT: 13.2 % (ref 11.5–14.5)
GLUCOSE SERPL-MCNC: 71 MG/DL (ref 74–99)
GRAM STN SPEC: ABNORMAL
HCT VFR BLD AUTO: 37.9 % (ref 41–52)
HGB BLD-MCNC: 13 G/DL (ref 13.5–17.5)
IMM GRANULOCYTES # BLD AUTO: 0.09 X10*3/UL (ref 0–0.5)
IMM GRANULOCYTES NFR BLD AUTO: 0.7 % (ref 0–0.9)
LEFT VENTRICLE INTERNAL DIMENSION DIASTOLE: 4.3 CM (ref 3.5–6)
LYMPHOCYTES # BLD AUTO: 1.49 X10*3/UL (ref 0.8–3)
LYMPHOCYTES NFR BLD AUTO: 11 %
MAGNESIUM SERPL-MCNC: 2.08 MG/DL (ref 1.6–2.4)
MCH RBC QN AUTO: 31.7 PG (ref 26–34)
MCHC RBC AUTO-ENTMCNC: 34.3 G/DL (ref 32–36)
MCV RBC AUTO: 92 FL (ref 80–100)
MONOCYTES # BLD AUTO: 1.07 X10*3/UL (ref 0.05–0.8)
MONOCYTES NFR BLD AUTO: 7.9 %
NEUTROPHILS # BLD AUTO: 10.72 X10*3/UL (ref 1.6–5.5)
NEUTROPHILS NFR BLD AUTO: 79.3 %
NRBC BLD-RTO: 0 /100 WBCS (ref 0–0)
PHOSPHATE SERPL-MCNC: 2.4 MG/DL (ref 2.5–4.9)
PLATELET # BLD AUTO: 237 X10*3/UL (ref 150–450)
POTASSIUM SERPL-SCNC: 4.4 MMOL/L (ref 3.5–5.3)
PROT SERPL-MCNC: 5.4 G/DL (ref 6.4–8.2)
RBC # BLD AUTO: 4.1 X10*6/UL (ref 4.5–5.9)
RIGHT VENTRICLE FREE WALL PEAK S': 15.2 CM/S
RIGHT VENTRICLE PEAK SYSTOLIC PRESSURE: 37.1 MMHG
SODIUM SERPL-SCNC: 129 MMOL/L (ref 136–145)
TRICUSPID ANNULAR PLANE SYSTOLIC EXCURSION: 2.3 CM
WBC # BLD AUTO: 13.5 X10*3/UL (ref 4.4–11.3)

## 2025-02-18 PROCEDURE — 93308 TTE F-UP OR LMTD: CPT | Performed by: STUDENT IN AN ORGANIZED HEALTH CARE EDUCATION/TRAINING PROGRAM

## 2025-02-18 PROCEDURE — 2500000004 HC RX 250 GENERAL PHARMACY W/ HCPCS (ALT 636 FOR OP/ED)

## 2025-02-18 PROCEDURE — 2500000001 HC RX 250 WO HCPCS SELF ADMINISTERED DRUGS (ALT 637 FOR MEDICARE OP)

## 2025-02-18 PROCEDURE — 99232 SBSQ HOSP IP/OBS MODERATE 35: CPT | Performed by: INTERNAL MEDICINE

## 2025-02-18 PROCEDURE — 80053 COMPREHEN METABOLIC PANEL: CPT

## 2025-02-18 PROCEDURE — 85025 COMPLETE CBC W/AUTO DIFF WBC: CPT

## 2025-02-18 PROCEDURE — 2500000005 HC RX 250 GENERAL PHARMACY W/O HCPCS

## 2025-02-18 PROCEDURE — 36415 COLL VENOUS BLD VENIPUNCTURE: CPT

## 2025-02-18 PROCEDURE — 2500000005 HC RX 250 GENERAL PHARMACY W/O HCPCS: Performed by: STUDENT IN AN ORGANIZED HEALTH CARE EDUCATION/TRAINING PROGRAM

## 2025-02-18 PROCEDURE — 93321 DOPPLER ECHO F-UP/LMTD STD: CPT | Performed by: STUDENT IN AN ORGANIZED HEALTH CARE EDUCATION/TRAINING PROGRAM

## 2025-02-18 PROCEDURE — S4991 NICOTINE PATCH NONLEGEND: HCPCS

## 2025-02-18 PROCEDURE — 93325 DOPPLER ECHO COLOR FLOW MAPG: CPT | Performed by: STUDENT IN AN ORGANIZED HEALTH CARE EDUCATION/TRAINING PROGRAM

## 2025-02-18 PROCEDURE — 1200000002 HC GENERAL ROOM WITH TELEMETRY DAILY

## 2025-02-18 PROCEDURE — 83735 ASSAY OF MAGNESIUM: CPT

## 2025-02-18 PROCEDURE — 93325 DOPPLER ECHO COLOR FLOW MAPG: CPT

## 2025-02-18 PROCEDURE — 2500000001 HC RX 250 WO HCPCS SELF ADMINISTERED DRUGS (ALT 637 FOR MEDICARE OP): Performed by: STUDENT IN AN ORGANIZED HEALTH CARE EDUCATION/TRAINING PROGRAM

## 2025-02-18 PROCEDURE — 2500000002 HC RX 250 W HCPCS SELF ADMINISTERED DRUGS (ALT 637 FOR MEDICARE OP, ALT 636 FOR OP/ED)

## 2025-02-18 PROCEDURE — 94640 AIRWAY INHALATION TREATMENT: CPT

## 2025-02-18 PROCEDURE — 84100 ASSAY OF PHOSPHORUS: CPT

## 2025-02-18 RX ORDER — AMOXICILLIN 250 MG
1 CAPSULE ORAL NIGHTLY
Status: DISCONTINUED | OUTPATIENT
Start: 2025-02-18 | End: 2025-02-21 | Stop reason: HOSPADM

## 2025-02-18 RX ORDER — POLYETHYLENE GLYCOL 3350 17 G/17G
17 POWDER, FOR SOLUTION ORAL DAILY
Status: DISCONTINUED | OUTPATIENT
Start: 2025-02-19 | End: 2025-02-21 | Stop reason: HOSPADM

## 2025-02-18 RX ADMIN — DIBASIC SODIUM PHOSPHATE, MONOBASIC POTASSIUM PHOSPHATE AND MONOBASIC SODIUM PHOSPHATE 250 MG: 852; 155; 130 TABLET ORAL at 13:10

## 2025-02-18 RX ADMIN — ACETAMINOPHEN 650 MG: 325 TABLET ORAL at 20:31

## 2025-02-18 RX ADMIN — CEFTRIAXONE SODIUM 1 G: 1 INJECTION, SOLUTION INTRAVENOUS at 14:14

## 2025-02-18 RX ADMIN — ENOXAPARIN SODIUM 40 MG: 100 INJECTION SUBCUTANEOUS at 20:31

## 2025-02-18 RX ADMIN — ACETAMINOPHEN 650 MG: 325 TABLET ORAL at 14:18

## 2025-02-18 RX ADMIN — FORMOTEROL FUMARATE DIHYDRATE 20 MCG: 20 SOLUTION RESPIRATORY (INHALATION) at 20:00

## 2025-02-18 RX ADMIN — GUAIFENESIN 600 MG: 600 TABLET ORAL at 08:07

## 2025-02-18 RX ADMIN — AZITHROMYCIN DIHYDRATE 500 MG: 500 TABLET, FILM COATED ORAL at 08:06

## 2025-02-18 RX ADMIN — DIBASIC SODIUM PHOSPHATE, MONOBASIC POTASSIUM PHOSPHATE AND MONOBASIC SODIUM PHOSPHATE 250 MG: 852; 155; 130 TABLET ORAL at 17:26

## 2025-02-18 RX ADMIN — TIOTROPIUM BROMIDE INHALATION SPRAY 2 PUFF: 3.12 SPRAY, METERED RESPIRATORY (INHALATION) at 08:56

## 2025-02-18 RX ADMIN — Medication 1 L/MIN: at 20:13

## 2025-02-18 RX ADMIN — PERFLUTREN 10 ML OF DILUTION: 6.52 INJECTION, SUSPENSION INTRAVENOUS at 15:35

## 2025-02-18 RX ADMIN — SENNOSIDES AND DOCUSATE SODIUM 2 TABLET: 50; 8.6 TABLET ORAL at 08:06

## 2025-02-18 RX ADMIN — LIDOCAINE 4% 1 PATCH: 40 PATCH TOPICAL at 08:06

## 2025-02-18 RX ADMIN — CLOPIDOGREL BISULFATE 75 MG: 75 TABLET ORAL at 05:14

## 2025-02-18 RX ADMIN — FORMOTEROL FUMARATE DIHYDRATE 20 MCG: 20 SOLUTION RESPIRATORY (INHALATION) at 08:56

## 2025-02-18 RX ADMIN — GUAIFENESIN 600 MG: 600 TABLET ORAL at 20:31

## 2025-02-18 RX ADMIN — METOPROLOL SUCCINATE 12.5 MG: 25 TABLET, EXTENDED RELEASE ORAL at 08:06

## 2025-02-18 RX ADMIN — NICOTINE 1 PATCH: 14 PATCH, EXTENDED RELEASE TRANSDERMAL at 08:09

## 2025-02-18 RX ADMIN — ASPIRIN 81 MG: 81 TABLET, COATED ORAL at 08:07

## 2025-02-18 RX ADMIN — ACETAMINOPHEN 650 MG: 325 TABLET ORAL at 05:14

## 2025-02-18 ASSESSMENT — COGNITIVE AND FUNCTIONAL STATUS - GENERAL
PERSONAL GROOMING: A LITTLE
TOILETING: A LITTLE
STANDING UP FROM CHAIR USING ARMS: A LITTLE
DRESSING REGULAR UPPER BODY CLOTHING: A LITTLE
TURNING FROM BACK TO SIDE WHILE IN FLAT BAD: A LITTLE
DRESSING REGULAR LOWER BODY CLOTHING: A LITTLE
WALKING IN HOSPITAL ROOM: A LOT
TOILETING: A LITTLE
CLIMB 3 TO 5 STEPS WITH RAILING: A LOT
DRESSING REGULAR UPPER BODY CLOTHING: A LITTLE
TOILETING: A LITTLE
DAILY ACTIVITIY SCORE: 19
PERSONAL GROOMING: A LITTLE
MOVING TO AND FROM BED TO CHAIR: A LITTLE
TOILETING: A LITTLE
DRESSING REGULAR UPPER BODY CLOTHING: A LITTLE
MOBILITY SCORE: 17
CLIMB 3 TO 5 STEPS WITH RAILING: A LOT
STANDING UP FROM CHAIR USING ARMS: A LITTLE
DRESSING REGULAR UPPER BODY CLOTHING: A LITTLE
MOVING TO AND FROM BED TO CHAIR: A LITTLE
HELP NEEDED FOR BATHING: A LITTLE
CLIMB 3 TO 5 STEPS WITH RAILING: A LOT
CLIMB 3 TO 5 STEPS WITH RAILING: A LOT
MOBILITY SCORE: 17
WALKING IN HOSPITAL ROOM: A LOT
DRESSING REGULAR LOWER BODY CLOTHING: A LITTLE
TURNING FROM BACK TO SIDE WHILE IN FLAT BAD: A LITTLE
DRESSING REGULAR LOWER BODY CLOTHING: A LITTLE
CLIMB 3 TO 5 STEPS WITH RAILING: A LOT
TURNING FROM BACK TO SIDE WHILE IN FLAT BAD: A LITTLE
MOVING TO AND FROM BED TO CHAIR: A LITTLE
WALKING IN HOSPITAL ROOM: A LOT
TOILETING: A LITTLE
DRESSING REGULAR LOWER BODY CLOTHING: A LITTLE
HELP NEEDED FOR BATHING: A LITTLE
HELP NEEDED FOR BATHING: A LITTLE
MOVING TO AND FROM BED TO CHAIR: A LITTLE
DAILY ACTIVITIY SCORE: 19
TOILETING: A LITTLE
STANDING UP FROM CHAIR USING ARMS: A LITTLE
WALKING IN HOSPITAL ROOM: A LOT
CLIMB 3 TO 5 STEPS WITH RAILING: A LOT
PERSONAL GROOMING: A LITTLE
PERSONAL GROOMING: A LITTLE
DRESSING REGULAR UPPER BODY CLOTHING: A LITTLE
CLIMB 3 TO 5 STEPS WITH RAILING: A LOT
DAILY ACTIVITIY SCORE: 19
DAILY ACTIVITIY SCORE: 19
HELP NEEDED FOR BATHING: A LITTLE
DRESSING REGULAR LOWER BODY CLOTHING: A LITTLE
MOVING TO AND FROM BED TO CHAIR: A LITTLE
DAILY ACTIVITIY SCORE: 19
MOBILITY SCORE: 17
STANDING UP FROM CHAIR USING ARMS: A LITTLE
HELP NEEDED FOR BATHING: A LITTLE
WALKING IN HOSPITAL ROOM: A LOT
TURNING FROM BACK TO SIDE WHILE IN FLAT BAD: A LITTLE
MOBILITY SCORE: 17
DRESSING REGULAR UPPER BODY CLOTHING: A LITTLE
PERSONAL GROOMING: A LITTLE
DRESSING REGULAR LOWER BODY CLOTHING: A LITTLE
TURNING FROM BACK TO SIDE WHILE IN FLAT BAD: A LITTLE
MOBILITY SCORE: 17
STANDING UP FROM CHAIR USING ARMS: A LITTLE
DRESSING REGULAR UPPER BODY CLOTHING: A LITTLE
MOVING TO AND FROM BED TO CHAIR: A LITTLE
PERSONAL GROOMING: A LITTLE
TURNING FROM BACK TO SIDE WHILE IN FLAT BAD: A LITTLE
PERSONAL GROOMING: A LITTLE
STANDING UP FROM CHAIR USING ARMS: A LITTLE
TOILETING: A LITTLE
MOVING TO AND FROM BED TO CHAIR: A LITTLE
DAILY ACTIVITIY SCORE: 19
MOBILITY SCORE: 17
TURNING FROM BACK TO SIDE WHILE IN FLAT BAD: A LITTLE
HELP NEEDED FOR BATHING: A LITTLE
DRESSING REGULAR LOWER BODY CLOTHING: A LITTLE
STANDING UP FROM CHAIR USING ARMS: A LITTLE
WALKING IN HOSPITAL ROOM: A LOT
HELP NEEDED FOR BATHING: A LITTLE
WALKING IN HOSPITAL ROOM: A LOT

## 2025-02-18 ASSESSMENT — PAIN - FUNCTIONAL ASSESSMENT: PAIN_FUNCTIONAL_ASSESSMENT: 0-10

## 2025-02-18 ASSESSMENT — PAIN SCALES - GENERAL: PAINLEVEL_OUTOF10: 3

## 2025-02-18 NOTE — PROGRESS NOTES
Physical Therapy                 Therapy Communication Note    Patient Name: Mickey Dakin  MRN: [id number]  Department: [place] [address]  Room: [address] [place] [address]  Today's Date: 2/18/2025     Discipline: Physical Therapy          Missed Visit Reason: Missed Visit Reason: Other (Comment)    Missed Time: Attempt    Comment:  PT treatment attempted; pt politely declined due to fatigue. Reports he was OOB earlier this morning and then had extensive clean up for BM. Reports ambulating with mobility aide yesterday. Will re-attempt as schedule permits.

## 2025-02-18 NOTE — PROGRESS NOTES
02/18/25 1230   Rapid Rounds   Attendance Provider;Nurse;Care Transitions   Expected Discharge Disposition Home H   Today we still await: Clinical stability   Review at Escalation Rounds Clinically complex

## 2025-02-18 NOTE — SIGNIFICANT EVENT
Rapid Response Nurse Note: RADAR alert: 6    Pager time: 24  Arrival time: 30  Event end time: 35  Location: [facility]  [x] Triage by phone or secure messaging    Rapid response initiated by:  [] Rapid response RN [] Family [] Nursing Supervisor [] Physician   [x] RADAR auto page [] Sepsis auto-page [] RN [] RT   [] NP/PA [] Other:     Primary reason for call:   [] BAT [] New CPAP/BiPAP [] Bleeding [] Change in mental status   [] Chest pain [] Code blue [] FiO2 >/= 50% [] HR </= 40 bpm   [] HR >/= 130 bpm [] Hyperglycemia [] Hypoglycemia [x] RADAR    [] RR </= 8 bpm [] RR >/= 30 bpm [] SBP </= 90 mmHg [] SpO2 < 90%   [] Seizure [] Sepsis [] Shortness of breath  [] Staff concern: see comments     Initial VS and/or RADAR VS: T 36 °C; HR 71; RR 20; /56; SPO2 91%.    Providers present at bedside (if applicable): Bedside RN    Name of ICU Provider contacted (if applicable): NA    Interventions:  [x] None [] ABG/VBG [] Assist w/ICU transfer [] BAT paged    [] Bag mask [] Blood [] Cardioversion [] Code Blue   [] Code blue for intubation [] Code status changed [] Chest x-ray [] EKG   [] IV fluid/bolus [] KUB x-ray [] Labs/cultures [] Medication   [] Nebulizer treatment [] NIPPV (CPAP/BiPAP) [] Oxygen [] Oral airway   [] Peripheral IV [] Palliative care consult [] CT/MRI [] Sepsis protocol    [] Suctioned [] Other:     Outcome:  [] Coded and  [] Code blue for intubation [] Coded and transferred to ICU []  on division   [x] Remained on division (no change) [] Remained on division + additional monitoring [] Remained in ED [] Transferred to ED   [] Transferred to ICU [] Transferred to inpatient status [] Transferred for interventions (procedure) [] Transferred to ICU stepdown    [] Transferred to surgery [] Transferred to telemetry [] Sepsis protocol [] STEMI protocol   [] Stroke protocol [x] Bedside nurse instructed to page rapid response for any concerns or acute change in condition/VS     Additional  Comments: Radar=6 paged 2/18@0002; messaged RN who stated that pt was at baseline & has no acute concerns @ this time

## 2025-02-18 NOTE — PROGRESS NOTES
"Mickey Dakin is a 79 y.o. male on day 7 of admission presenting with Mediastinal hematoma, initial encounter.    Subjective     NAEON. States he is feeling better this AM. States his cough is improving. Denies SOB or chest pain. States he walked with PT yesterday using the rollator. Had 2 BM yesterday.       Objective     Physical Exam   Gen: NAD, elderly man sitting in chair comfortably  Resp: diminished breath sounds bilaterally, no wheezes/crackles, normal respiratory effort on 2L NC, pursed lip breathing with minimal accessory muscle use  CV: RRR, no murmurs  GI: soft, nontender  MSK: no edema BLE  Skin: scattered bruises on bilateral arms  Psych: appropriate mood and affect     Last Recorded Vitals  Blood pressure 112/55, pulse 75, temperature 36.1 °C (97 °F), temperature source Temporal, resp. rate 18, height 1.676 m (5' 6\"), weight 59.3 kg (130 lb 11.7 oz), SpO2 97%.  Intake/Output last 3 Shifts:  I/O last 3 completed shifts:  In: 1140 (19.2 mL/kg) [P.O.:1140]  Out: 2450 (41.3 mL/kg) [Urine:2450 (1.1 mL/kg/hr)]  Weight: 59.3 kg     Medications   Scheduled medications   [Held by provider] amLODIPine, 10 mg, oral, Daily  aspirin, 81 mg, oral, Daily  azithromycin, 500 mg, oral, q24h DC  cefTRIAXone, 1 g, intravenous, q24h  clopidogrel, 75 mg, oral, Daily  enoxaparin, 40 mg, subcutaneous, q24h  [Held by provider] ezetimibe, 10 mg, oral, Daily  formoterol, 20 mcg, nebulization, BID  guaiFENesin, 600 mg, oral, BID  lidocaine, 1 patch, transdermal, Daily  metoprolol succinate XL, 12.5 mg, oral, Daily  nicotine, 1 patch, transdermal, Daily   Followed by  [START ON 3/29/2025] nicotine, 1 patch, transdermal, Daily  polyethylene glycol, 17 g, oral, BID  [Held by provider] rosuvastatin, 40 mg, oral, Nightly  sennosides-docusate sodium, 2 tablet, oral, BID  tiotropium, 2 puff, inhalation, Daily  [Held by provider] valsartan, 160 mg, oral, Daily      PRN medications   PRN medications: acetaminophen **OR** acetaminophen " **OR** acetaminophen, albuterol, dextrose, dextrose, glucagon, glucagon, nicotine polacrilex, ondansetron, oxygen, sodium chloride     Recent labs  WBC   Date Value Ref Range Status   02/18/2025 13.5 (H) 4.4 - 11.3 x10*3/uL Final   02/17/2025 14.6 (H) 4.4 - 11.3 x10*3/uL Final   02/16/2025 11.6 (H) 4.4 - 11.3 x10*3/uL Final     Hemoglobin   Date Value Ref Range Status   02/18/2025 13.0 (L) 13.5 - 17.5 g/dL Final   02/17/2025 12.5 (L) 13.5 - 17.5 g/dL Final   02/16/2025 13.0 (L) 13.5 - 17.5 g/dL Final     Platelets   Date Value Ref Range Status   02/18/2025 237 150 - 450 x10*3/uL Final   02/17/2025 188 150 - 450 x10*3/uL Final   02/16/2025 188 150 - 450 x10*3/uL Final     Glucose   Date Value Ref Range Status   02/17/2025 70 (L) 74 - 99 mg/dL Final   02/16/2025 87 74 - 99 mg/dL Final   02/15/2025 146 (H) 74 - 99 mg/dL Final     Sodium   Date Value Ref Range Status   02/17/2025 128 (L) 136 - 145 mmol/L Final   02/16/2025 127 (L) 136 - 145 mmol/L Final   02/15/2025 128 (L) 136 - 145 mmol/L Final     Potassium   Date Value Ref Range Status   02/17/2025 4.2 3.5 - 5.3 mmol/L Final   02/16/2025 4.4 3.5 - 5.3 mmol/L Final   02/15/2025 4.1 3.5 - 5.3 mmol/L Final     Chloride   Date Value Ref Range Status   02/17/2025 88 (L) 98 - 107 mmol/L Final   02/16/2025 89 (L) 98 - 107 mmol/L Final   02/15/2025 88 (L) 98 - 107 mmol/L Final     Urea Nitrogen   Date Value Ref Range Status   02/17/2025 13 6 - 23 mg/dL Final   02/16/2025 15 6 - 23 mg/dL Final   02/15/2025 16 6 - 23 mg/dL Final     Creatinine   Date Value Ref Range Status   02/17/2025 0.41 (L) 0.50 - 1.30 mg/dL Final   02/16/2025 0.46 (L) 0.50 - 1.30 mg/dL Final   02/15/2025 0.55 0.50 - 1.30 mg/dL Final     Magnesium   Date Value Ref Range Status   02/17/2025 2.11 1.60 - 2.40 mg/dL Final   02/16/2025 2.45 (H) 1.60 - 2.40 mg/dL Final   02/15/2025 1.90 1.60 - 2.40 mg/dL Final     Phosphorus   Date Value Ref Range Status   02/17/2025 2.5 2.5 - 4.9 mg/dL Final     Comment:      The performance characteristics of phosphorus testing in heparinized plasma have been validated by the individual  laboratory site where testing is performed. Testing on heparinized plasma is not approved by the FDA; however, such approval is not necessary.   02/16/2025 2.9 2.5 - 4.9 mg/dL Final     Comment:     The performance characteristics of phosphorus testing in heparinized plasma have been validated by the individual  laboratory site where testing is performed. Testing on heparinized plasma is not approved by the FDA; however, such approval is not necessary.   02/15/2025 3.8 2.5 - 4.9 mg/dL Final     Comment:     The performance characteristics of phosphorus testing in heparinized plasma have been validated by the individual  laboratory site where testing is performed. Testing on heparinized plasma is not approved by the FDA; however, such approval is not necessary.     Calcium   Date Value Ref Range Status   02/17/2025 8.3 (L) 8.6 - 10.6 mg/dL Final   02/16/2025 8.4 (L) 8.6 - 10.6 mg/dL Final   02/15/2025 8.4 (L) 8.6 - 10.6 mg/dL Final     Albumin   Date Value Ref Range Status   02/17/2025 3.1 (L) 3.4 - 5.0 g/dL Final   02/16/2025 3.3 (L) 3.4 - 5.0 g/dL Final   02/15/2025 3.4 3.4 - 5.0 g/dL Final              Assessment/Plan   Assessment & Plan  Mediastinal hematoma, initial encounter    Other emphysema (Multi)    Moderate aortic stenosis    Mr. Dakin is a 79 y.o. 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 Placement of LCx stent was c/b retrograde left main dissection with chest pain and hypotension to SBP 50s. Cath team deployed second stent to LM to cover the  lesions. He was resuscitated with 2L LR, 1 unit pRBC, 1 unit platelets, and 1 unit cryo. He was also started on levophed. He was then transferred to CICU where TTE showed no signs of tamponade and CTA showed stable mediastinal and pericardial hemorrhage. He was started on DAPT for LCx stent. He has been stable off levophed since 2/12. Post-PCI he had elevated LFTs and leukocytosis, which have been downtrending since, suspect shock liver and reactive leukocytosis. Metoprolol succinate 12.5 mg daily was started on 2/14. He continued to require 2L NC on 2/14 so CXR was obtained showing R basilar edema/effusions c/w hypervolemia, and he was diuresed with lasix 40 mg x2 with 2.6 L of urine output. No signs or symptoms of infection or acute COPD exacerbation on exam, suspect O2 requirement 2/2 pulmonary edema. Continued diuresis through 2/17 when patient appeared euvolemic with softer BP. Repeat TTE 2/15 stable compared to 2/12 with small anterior pericardial effusion. Patient continued to require 2L O2 to maintain SpO2 > 92%. Repeat CXR on 2/17 c/f possible RLL pneumonia iso increasing leukocytosis and increased productive cough, so started CTX (2/17 - 2/21) and azithromycin (2/17 - 2/19). Given patient has continued symptoms, will obtain repeat TTE to monitor pericardial effusion.     2/18 Updates:  - 2/17 CXR c/f possible RLL pneumonia iso increasing leukocytosis and increased productive cough - started CTX (2/17 - 2/21) and azithromycin (2/17 - 2/19) for CAP  - Covid, RSV, flu negative, sputum culture with salivary contamination  - Repeat limited TTE today to monitor pericardial effusion  - PT to re-assess today as patient requiring 2 person assist to transfer from bed to chair and family expressing concern for navigating steps at home  - Encourage incentive spirometer use and wean O2 as able  - LFTs continue to downtrend    #Severe COPD  #Community acquired pneumonia  #Current tobacco user  :: 1/2025 PFTs with FEV1  0.59 L (24%) c/w severe airflow obstruction  :: 2/17 CXR with c/f possible RLL pneumonia iso increasing leukocytosis and increased productive cough  :: 2/17 RSV, flu, covid negative  :: 2/17 sputum culture with salivary contamination  - formoterol BID + tiotropium BID   - guaifenesin BID  - albuterol nebs PRN  - nicotine patch + gum for smoking cessation, plan to continue on discharge  - 5 days CTX (2/17 - 2/19) and 3 days azithromycin (2/17 - 2/19) for empiric CAP treatment  - Incentive spirometry, wean FiO2 to SpO2 > 92%     # Severe MVD s/p complex PCI to Lcx and LM c/b LM dissection (original plan for PCI ot LAD, Lcx, and RCA)  # Mediastinal and pericardial hemorrhage 2/2 LM dissection  # Thoracic and abdominal aortic aneurysms (3.6 cm and 5.4 cm respectively)  # Moderate aortic stenosis and regurgitation - stable per intracath TTE  # HTN  :: 12/2024 cath with 90% stenosis of Lcx and RCA, 80% stenosis of RPL, 70% stenosis of LAD, and < 10 % stenosis of LM   :: 2/11 cath with 90% stenosis of proximal Lcx and 60% stenosis of LAD s/p PCI to Lcx and LM c/b LM dissection  :: 2/12 CTA with slight improvement in mediastinal and pericardial hemorrhage  :: 2/12 TTE without signs of tamponade, EF 60-65%, mod-severe aortic valve calcification, moderate AS  :: 2/15 TTE EF 60-65%, normal RV function, small anterior pericardial effusion without tamponade, valvular function not assessed  :: assessed by CT surgery while in CICU, no surgical plans  -aspirin and plavix post stent  -started metoprolol 12.5 mg daily on 2/14  -holding home felodipine 10 mg and olmesartan 20 mg daily iso normotension  -holding home rosuvastatin and zetia (held due to liver injury) iso elevated LFTs  [ ] repeat limited TTE to monitor pericardial effusion today 2/18     #Hyponatremia  :: baseline 134-135  - 2/16 Sosm 266 L, Uosm 598 H, Lorena 16 L c/w hypovolemic vs dilutional, suspect hypovolemic given soft BP and euvolemia on exam  - starting to uptrend  (127 > 129) now that patient appears euvolemic    #Constipation  - last BM Monday 2/10, abdomen soft NTND  - miralax BID  - doc-senna 2 tab BID  - dulocolax suppository on 2/17     #DVT ppx  - lovenox daily     #Dispo  [ ] PT/OT recommending low intensity on 2/13, requested repeat assessment today 2/18 given patient requiring 2-person assist to transfer from bed to chair  - Cardiology follow-up scheduled for 2/28  - Pulmonology follow-up requested  - Vascular surgery follow-up requested (AAA repair)  - Structural heart follow-up requested (TAVR)     F: PRN   E: PRN   N: Cardiac, 2-3g Na  Access/Tubes: PIV  Antimicrobials: none  DVT prophylaxis: lovenox     Code status: DNR / ok for intubation or ICU (confirmed on transfer to floor)  NOK: Dakin,Sally (Spouse) [phone] (Mobile)        Annalee Tucker MD  Neurology, PGY-1

## 2025-02-18 NOTE — CARE PLAN
Problem: Fall/Injury  Goal: Be free from injury by end of the shift  Outcome: Progressing     Problem: Chronic Conditions and Co-morbidities  Goal: Patient's chronic conditions and co-morbidity symptoms are monitored and maintained or improved  Outcome: Progressing       The clinical goals for the shift include Pt's pulse ox will remain >92% throughout shift.

## 2025-02-19 ENCOUNTER — APPOINTMENT (OUTPATIENT)
Dept: RADIOLOGY | Facility: HOSPITAL | Age: 80
End: 2025-02-19
Payer: MEDICARE

## 2025-02-19 LAB
ALBUMIN SERPL BCP-MCNC: 2.8 G/DL (ref 3.4–5)
ALP SERPL-CCNC: 79 U/L (ref 33–136)
ALT SERPL W P-5'-P-CCNC: 262 U/L (ref 10–52)
ANION GAP SERPL CALC-SCNC: 9 MMOL/L (ref 10–20)
AST SERPL W P-5'-P-CCNC: 53 U/L (ref 9–39)
BASOPHILS # BLD AUTO: 0.07 X10*3/UL (ref 0–0.1)
BASOPHILS NFR BLD AUTO: 0.7 %
BILIRUB SERPL-MCNC: 0.7 MG/DL (ref 0–1.2)
BUN SERPL-MCNC: 10 MG/DL (ref 6–23)
CALCIUM SERPL-MCNC: 8.4 MG/DL (ref 8.6–10.6)
CHLORIDE SERPL-SCNC: 92 MMOL/L (ref 98–107)
CO2 SERPL-SCNC: 34 MMOL/L (ref 21–32)
CREAT SERPL-MCNC: 0.44 MG/DL (ref 0.5–1.3)
EGFRCR SERPLBLD CKD-EPI 2021: >90 ML/MIN/1.73M*2
EOSINOPHIL # BLD AUTO: 0.08 X10*3/UL (ref 0–0.4)
EOSINOPHIL NFR BLD AUTO: 0.8 %
ERYTHROCYTE [DISTWIDTH] IN BLOOD BY AUTOMATED COUNT: 13.4 % (ref 11.5–14.5)
GLUCOSE SERPL-MCNC: 107 MG/DL (ref 74–99)
HCT VFR BLD AUTO: 37.3 % (ref 41–52)
HGB BLD-MCNC: 12.4 G/DL (ref 13.5–17.5)
IMM GRANULOCYTES # BLD AUTO: 0.04 X10*3/UL (ref 0–0.5)
IMM GRANULOCYTES NFR BLD AUTO: 0.4 % (ref 0–0.9)
LYMPHOCYTES # BLD AUTO: 1.26 X10*3/UL (ref 0.8–3)
LYMPHOCYTES NFR BLD AUTO: 13.4 %
MAGNESIUM SERPL-MCNC: 2.1 MG/DL (ref 1.6–2.4)
MCH RBC QN AUTO: 32.3 PG (ref 26–34)
MCHC RBC AUTO-ENTMCNC: 33.2 G/DL (ref 32–36)
MCV RBC AUTO: 97 FL (ref 80–100)
MONOCYTES # BLD AUTO: 1.03 X10*3/UL (ref 0.05–0.8)
MONOCYTES NFR BLD AUTO: 10.9 %
NEUTROPHILS # BLD AUTO: 6.95 X10*3/UL (ref 1.6–5.5)
NEUTROPHILS NFR BLD AUTO: 73.8 %
NRBC BLD-RTO: 0 /100 WBCS (ref 0–0)
PHOSPHATE SERPL-MCNC: 2.7 MG/DL (ref 2.5–4.9)
PLATELET # BLD AUTO: 262 X10*3/UL (ref 150–450)
POTASSIUM SERPL-SCNC: 4.4 MMOL/L (ref 3.5–5.3)
PROT SERPL-MCNC: 5.2 G/DL (ref 6.4–8.2)
RBC # BLD AUTO: 3.84 X10*6/UL (ref 4.5–5.9)
SODIUM SERPL-SCNC: 131 MMOL/L (ref 136–145)
WBC # BLD AUTO: 9.4 X10*3/UL (ref 4.4–11.3)

## 2025-02-19 PROCEDURE — S4991 NICOTINE PATCH NONLEGEND: HCPCS

## 2025-02-19 PROCEDURE — 2500000004 HC RX 250 GENERAL PHARMACY W/ HCPCS (ALT 636 FOR OP/ED)

## 2025-02-19 PROCEDURE — 97116 GAIT TRAINING THERAPY: CPT | Mod: GP,CQ

## 2025-02-19 PROCEDURE — 94640 AIRWAY INHALATION TREATMENT: CPT

## 2025-02-19 PROCEDURE — 71045 X-RAY EXAM CHEST 1 VIEW: CPT | Performed by: RADIOLOGY

## 2025-02-19 PROCEDURE — 36415 COLL VENOUS BLD VENIPUNCTURE: CPT

## 2025-02-19 PROCEDURE — 71045 X-RAY EXAM CHEST 1 VIEW: CPT

## 2025-02-19 PROCEDURE — 80053 COMPREHEN METABOLIC PANEL: CPT

## 2025-02-19 PROCEDURE — 2500000001 HC RX 250 WO HCPCS SELF ADMINISTERED DRUGS (ALT 637 FOR MEDICARE OP)

## 2025-02-19 PROCEDURE — 84100 ASSAY OF PHOSPHORUS: CPT

## 2025-02-19 PROCEDURE — 2500000002 HC RX 250 W HCPCS SELF ADMINISTERED DRUGS (ALT 637 FOR MEDICARE OP, ALT 636 FOR OP/ED)

## 2025-02-19 PROCEDURE — 85025 COMPLETE CBC W/AUTO DIFF WBC: CPT

## 2025-02-19 PROCEDURE — 83735 ASSAY OF MAGNESIUM: CPT

## 2025-02-19 PROCEDURE — 1200000002 HC GENERAL ROOM WITH TELEMETRY DAILY

## 2025-02-19 PROCEDURE — 2500000001 HC RX 250 WO HCPCS SELF ADMINISTERED DRUGS (ALT 637 FOR MEDICARE OP): Performed by: STUDENT IN AN ORGANIZED HEALTH CARE EDUCATION/TRAINING PROGRAM

## 2025-02-19 PROCEDURE — 2500000005 HC RX 250 GENERAL PHARMACY W/O HCPCS

## 2025-02-19 PROCEDURE — 99232 SBSQ HOSP IP/OBS MODERATE 35: CPT | Performed by: INTERNAL MEDICINE

## 2025-02-19 PROCEDURE — 97530 THERAPEUTIC ACTIVITIES: CPT | Mod: GP,CQ

## 2025-02-19 RX ORDER — POLYETHYLENE GLYCOL 3350 17 G/17G
17 POWDER, FOR SOLUTION ORAL ONCE
Status: DISCONTINUED | OUTPATIENT
Start: 2025-02-19 | End: 2025-02-21 | Stop reason: HOSPADM

## 2025-02-19 RX ADMIN — METOPROLOL SUCCINATE 12.5 MG: 25 TABLET, EXTENDED RELEASE ORAL at 09:20

## 2025-02-19 RX ADMIN — CEFTRIAXONE SODIUM 1 G: 1 INJECTION, SOLUTION INTRAVENOUS at 15:00

## 2025-02-19 RX ADMIN — ACETAMINOPHEN 650 MG: 325 TABLET ORAL at 09:29

## 2025-02-19 RX ADMIN — NICOTINE 1 PATCH: 14 PATCH, EXTENDED RELEASE TRANSDERMAL at 09:23

## 2025-02-19 RX ADMIN — GUAIFENESIN 600 MG: 600 TABLET ORAL at 09:19

## 2025-02-19 RX ADMIN — CLOPIDOGREL BISULFATE 75 MG: 75 TABLET ORAL at 05:30

## 2025-02-19 RX ADMIN — FORMOTEROL FUMARATE DIHYDRATE 20 MCG: 20 SOLUTION RESPIRATORY (INHALATION) at 09:20

## 2025-02-19 RX ADMIN — ACETAMINOPHEN 650 MG: 325 TABLET ORAL at 05:30

## 2025-02-19 RX ADMIN — LIDOCAINE 4% 1 PATCH: 40 PATCH TOPICAL at 09:18

## 2025-02-19 RX ADMIN — GUAIFENESIN 600 MG: 600 TABLET ORAL at 20:22

## 2025-02-19 RX ADMIN — ASPIRIN 81 MG: 81 TABLET, COATED ORAL at 09:20

## 2025-02-19 RX ADMIN — ACETAMINOPHEN 650 MG: 325 TABLET ORAL at 20:22

## 2025-02-19 RX ADMIN — AZITHROMYCIN DIHYDRATE 500 MG: 500 TABLET, FILM COATED ORAL at 09:20

## 2025-02-19 RX ADMIN — TIOTROPIUM BROMIDE INHALATION SPRAY 2 PUFF: 3.12 SPRAY, METERED RESPIRATORY (INHALATION) at 09:21

## 2025-02-19 RX ADMIN — ENOXAPARIN SODIUM 40 MG: 100 INJECTION SUBCUTANEOUS at 20:22

## 2025-02-19 RX ADMIN — ACETAMINOPHEN 650 MG: 160 SOLUTION ORAL at 15:10

## 2025-02-19 RX ADMIN — SENNOSIDES AND DOCUSATE SODIUM 1 TABLET: 50; 8.6 TABLET ORAL at 20:22

## 2025-02-19 ASSESSMENT — COGNITIVE AND FUNCTIONAL STATUS - GENERAL
WALKING IN HOSPITAL ROOM: A LITTLE
WALKING IN HOSPITAL ROOM: A LOT
MOBILITY SCORE: 17
MOBILITY SCORE: 16
MOVING FROM LYING ON BACK TO SITTING ON SIDE OF FLAT BED WITH BEDRAILS: A LITTLE
TOILETING: A LITTLE
TOILETING: A LITTLE
CLIMB 3 TO 5 STEPS WITH RAILING: A LOT
TOILETING: A LOT
DRESSING REGULAR UPPER BODY CLOTHING: A LITTLE
TURNING FROM BACK TO SIDE WHILE IN FLAT BAD: A LITTLE
CLIMB 3 TO 5 STEPS WITH RAILING: TOTAL
DRESSING REGULAR LOWER BODY CLOTHING: A LITTLE
DAILY ACTIVITIY SCORE: 19
TURNING FROM BACK TO SIDE WHILE IN FLAT BAD: A LITTLE
STANDING UP FROM CHAIR USING ARMS: A LITTLE
MOVING TO AND FROM BED TO CHAIR: A LITTLE
STANDING UP FROM CHAIR USING ARMS: A LITTLE
HELP NEEDED FOR BATHING: A LITTLE
DRESSING REGULAR LOWER BODY CLOTHING: A LITTLE
PERSONAL GROOMING: A LITTLE
TURNING FROM BACK TO SIDE WHILE IN FLAT BAD: A LITTLE
TURNING FROM BACK TO SIDE WHILE IN FLAT BAD: A LITTLE
MOVING TO AND FROM BED TO CHAIR: A LITTLE
DAILY ACTIVITIY SCORE: 19
STANDING UP FROM CHAIR USING ARMS: A LITTLE
HELP NEEDED FOR BATHING: A LITTLE
HELP NEEDED FOR BATHING: A LITTLE
DAILY ACTIVITIY SCORE: 18
STANDING UP FROM CHAIR USING ARMS: A LITTLE
PERSONAL GROOMING: A LITTLE
MOBILITY SCORE: 17
MOVING TO AND FROM BED TO CHAIR: A LITTLE
DRESSING REGULAR LOWER BODY CLOTHING: A LITTLE
TOILETING: A LOT
WALKING IN HOSPITAL ROOM: A LOT
DRESSING REGULAR UPPER BODY CLOTHING: A LITTLE
DRESSING REGULAR LOWER BODY CLOTHING: A LITTLE
TURNING FROM BACK TO SIDE WHILE IN FLAT BAD: A LITTLE
MOVING TO AND FROM BED TO CHAIR: A LITTLE
STANDING UP FROM CHAIR USING ARMS: A LITTLE
MOVING TO AND FROM BED TO CHAIR: A LITTLE
CLIMB 3 TO 5 STEPS WITH RAILING: A LOT
PERSONAL GROOMING: A LITTLE
PERSONAL GROOMING: A LITTLE
CLIMB 3 TO 5 STEPS WITH RAILING: A LOT
MOBILITY SCORE: 17
DAILY ACTIVITIY SCORE: 19
WALKING IN HOSPITAL ROOM: A LOT
PERSONAL GROOMING: A LITTLE
CLIMB 3 TO 5 STEPS WITH RAILING: A LOT
TURNING FROM BACK TO SIDE WHILE IN FLAT BAD: A LITTLE
MOBILITY SCORE: 17
DRESSING REGULAR LOWER BODY CLOTHING: A LITTLE
MOBILITY SCORE: 17
DRESSING REGULAR UPPER BODY CLOTHING: A LITTLE
WALKING IN HOSPITAL ROOM: A LOT
CLIMB 3 TO 5 STEPS WITH RAILING: A LOT
DAILY ACTIVITIY SCORE: 18
MOVING TO AND FROM BED TO CHAIR: A LITTLE
CLIMB 3 TO 5 STEPS WITH RAILING: A LOT
STANDING UP FROM CHAIR USING ARMS: A LITTLE
HELP NEEDED FOR BATHING: A LITTLE
TURNING FROM BACK TO SIDE WHILE IN FLAT BAD: A LITTLE
STANDING UP FROM CHAIR USING ARMS: A LITTLE
MOBILITY SCORE: 17
MOVING TO AND FROM BED TO CHAIR: A LITTLE
WALKING IN HOSPITAL ROOM: A LOT
DRESSING REGULAR UPPER BODY CLOTHING: A LITTLE
DRESSING REGULAR UPPER BODY CLOTHING: A LITTLE
TOILETING: A LITTLE
WALKING IN HOSPITAL ROOM: A LOT
HELP NEEDED FOR BATHING: A LITTLE

## 2025-02-19 ASSESSMENT — PAIN DESCRIPTION - LOCATION
LOCATION: KNEE

## 2025-02-19 ASSESSMENT — PAIN SCALES - GENERAL
PAINLEVEL_OUTOF10: 3
PAINLEVEL_OUTOF10: 1
PAINLEVEL_OUTOF10: 2
PAINLEVEL_OUTOF10: 0 - NO PAIN
PAINLEVEL_OUTOF10: 3
PAINLEVEL_OUTOF10: 4

## 2025-02-19 ASSESSMENT — PAIN - FUNCTIONAL ASSESSMENT
PAIN_FUNCTIONAL_ASSESSMENT: 0-10

## 2025-02-19 ASSESSMENT — PAIN DESCRIPTION - ORIENTATION
ORIENTATION: RIGHT

## 2025-02-19 NOTE — PROGRESS NOTES
"Mickey Dakin is a 79 y.o. male on day 8 of admission presenting with Mediastinal hematoma, initial encounter.    Subjective     NAEON. States his cough is improving. Denies chest pain or acute SOB. Last BM yesterday.       Objective     Physical Exam   Gen: NAD, elderly man sitting in chair comfortably  Resp: diminished breath sounds bilaterally, no wheezes/crackles, normal respiratory effort on 1L NC, pursed lip breathing with minimal accessory muscle use  CV: RRR, no murmurs  GI: soft, nontender, nondistended  MSK: 1+ pitting edema to knee bilaterally  Skin: scattered bruises on bilateral arms  Psych: appropriate mood and affect     Last Recorded Vitals  Blood pressure 106/71, pulse 90, temperature 36.2 °C (97.2 °F), temperature source Temporal, resp. rate 18, height 1.676 m (5' 6\"), weight 58.3 kg (128 lb 8.5 oz), SpO2 96%.  Intake/Output last 3 Shifts:  I/O last 3 completed shifts:  In: 1260 (21.6 mL/kg) [P.O.:1260]  Out: 3250 (55.7 mL/kg) [Urine:3250 (1.5 mL/kg/hr)]  Weight: 58.3 kg     Medications   Scheduled medications   [Held by provider] amLODIPine, 10 mg, oral, Daily  aspirin, 81 mg, oral, Daily  cefTRIAXone, 1 g, intravenous, q24h  clopidogrel, 75 mg, oral, Daily  enoxaparin, 40 mg, subcutaneous, q24h  [Held by provider] ezetimibe, 10 mg, oral, Daily  formoterol, 20 mcg, nebulization, BID  guaiFENesin, 600 mg, oral, BID  lidocaine, 1 patch, transdermal, Daily  metoprolol succinate XL, 12.5 mg, oral, Daily  nicotine, 1 patch, transdermal, Daily   Followed by  [START ON 3/29/2025] nicotine, 1 patch, transdermal, Daily  perflutren protein A microsphere, 0.5 mL, intravenous, Once in imaging  polyethylene glycol, 17 g, oral, Daily  [Held by provider] rosuvastatin, 40 mg, oral, Nightly  sennosides-docusate sodium, 1 tablet, oral, Nightly  sulfur hexafluoride microsphr, 2 mL, intravenous, Once in imaging  tiotropium, 2 puff, inhalation, Daily  [Held by provider] valsartan, 160 mg, oral, Daily      PRN " medications   PRN medications: acetaminophen **OR** acetaminophen **OR** acetaminophen, albuterol, dextrose, dextrose, glucagon, glucagon, nicotine polacrilex, ondansetron, oxygen, sodium chloride     Recent labs  WBC   Date Value Ref Range Status   02/19/2025 9.4 4.4 - 11.3 x10*3/uL Final   02/18/2025 13.5 (H) 4.4 - 11.3 x10*3/uL Final   02/17/2025 14.6 (H) 4.4 - 11.3 x10*3/uL Final     Hemoglobin   Date Value Ref Range Status   02/19/2025 12.4 (L) 13.5 - 17.5 g/dL Final   02/18/2025 13.0 (L) 13.5 - 17.5 g/dL Final   02/17/2025 12.5 (L) 13.5 - 17.5 g/dL Final     Platelets   Date Value Ref Range Status   02/19/2025 262 150 - 450 x10*3/uL Final   02/18/2025 237 150 - 450 x10*3/uL Final   02/17/2025 188 150 - 450 x10*3/uL Final     Glucose   Date Value Ref Range Status   02/19/2025 107 (H) 74 - 99 mg/dL Final   02/18/2025 71 (L) 74 - 99 mg/dL Final   02/17/2025 70 (L) 74 - 99 mg/dL Final     Sodium   Date Value Ref Range Status   02/19/2025 131 (L) 136 - 145 mmol/L Final   02/18/2025 129 (L) 136 - 145 mmol/L Final   02/17/2025 128 (L) 136 - 145 mmol/L Final     Potassium   Date Value Ref Range Status   02/19/2025 4.4 3.5 - 5.3 mmol/L Final     Comment:     MILD HEMOLYSIS DETECTED. The result may be falsely elevated due to hemolysis or other interferents. Clinical correlation is recommended. Repeat testing may be considered.   02/18/2025 4.4 3.5 - 5.3 mmol/L Final   02/17/2025 4.2 3.5 - 5.3 mmol/L Final     Chloride   Date Value Ref Range Status   02/19/2025 92 (L) 98 - 107 mmol/L Final   02/18/2025 88 (L) 98 - 107 mmol/L Final   02/17/2025 88 (L) 98 - 107 mmol/L Final     Urea Nitrogen   Date Value Ref Range Status   02/19/2025 10 6 - 23 mg/dL Final   02/18/2025 10 6 - 23 mg/dL Final   02/17/2025 13 6 - 23 mg/dL Final     Creatinine   Date Value Ref Range Status   02/19/2025 0.44 (L) 0.50 - 1.30 mg/dL Final   02/18/2025 0.41 (L) 0.50 - 1.30 mg/dL Final   02/17/2025 0.41 (L) 0.50 - 1.30 mg/dL Final     Magnesium    Date Value Ref Range Status   02/19/2025 2.10 1.60 - 2.40 mg/dL Final   02/18/2025 2.08 1.60 - 2.40 mg/dL Final   02/17/2025 2.11 1.60 - 2.40 mg/dL Final     Phosphorus   Date Value Ref Range Status   02/19/2025 2.7 2.5 - 4.9 mg/dL Final     Comment:     MILD HEMOLYSIS DETECTED. The result may be falsely elevated due to hemolysis or other interferents. Clinical correlation is recommended. Repeat testing may be considered.  The performance characteristics of phosphorus testing in heparinized plasma have been validated by the individual  laboratory site where testing is performed. Testing on heparinized plasma is not approved by the FDA; however, such approval is not necessary.   02/18/2025 2.4 (L) 2.5 - 4.9 mg/dL Final     Comment:     The performance characteristics of phosphorus testing in heparinized plasma have been validated by the individual  laboratory site where testing is performed. Testing on heparinized plasma is not approved by the FDA; however, such approval is not necessary.   02/17/2025 2.5 2.5 - 4.9 mg/dL Final     Comment:     The performance characteristics of phosphorus testing in heparinized plasma have been validated by the individual  laboratory site where testing is performed. Testing on heparinized plasma is not approved by the FDA; however, such approval is not necessary.     Calcium   Date Value Ref Range Status   02/19/2025 8.4 (L) 8.6 - 10.6 mg/dL Final   02/18/2025 8.6 8.6 - 10.6 mg/dL Final   02/17/2025 8.3 (L) 8.6 - 10.6 mg/dL Final     Albumin   Date Value Ref Range Status   02/19/2025 2.8 (L) 3.4 - 5.0 g/dL Final   02/18/2025 3.1 (L) 3.4 - 5.0 g/dL Final   02/17/2025 3.1 (L) 3.4 - 5.0 g/dL Final         Imaging   Transthoracic Echo (TTE) Limited    Result Date: 2/18/2025   [facility], [address] [place] [address]                Tel [phone] and Fax [phone] TRANSTHORACIC ECHOCARDIOGRAM REPORT  Patient Name:       MICKEY DAKIN        Paolo  Physician:    [id number] Romie Perera MD Study Date:         2/18/2025           Ordering Provider:    [id number] SAMIRA TSE MRN/PID:            [id number]            Fellow: Accession#:         [id number]        Nurse: Date of Birth/Age:  1945 / 79      Sonographer:          Miguel A clemens RDCS Gender assigned at  M                   Additional Staff: Birth: Height:             167.64 cm           Admit Date: Weight:             58.97 kg            Admission Status:     Inpatient -                                                               Routine BSA / BMI:          1.67 m2 / 20.98     Encounter#:           [id number]                     kg/m2 Blood Pressure:     143/68 mmHg         Department Location:  [place] [address] Study Type:    TRANSTHORACIC ECHO (TTE) LIMITED Diagnosis/ICD: Encounter for screening for cardiovascular disorders-Z13.6 Indication:    effusion CPT Code:      Doppler Limited-45180; Echo Limited-62601; Color Doppler-18072 Patient History: Pertinent History: Mediastinal hematoma, COPD, CAD, AAA, CP. Study Detail: The following Echo studies were performed: 2D, M-Mode, Doppler and               color flow. Technically challenging study due to patient lying in               supine position, body habitus and prominent lung artifact.               Definity used as a contrast agent for endocardial border               definition. Total contrast used for this procedure was 2 mL via IV               push.  PHYSICIAN INTERPRETATION: Left Ventricle: Left ventricular ejection fraction is normal, calculated by Geiger's biplane at 68%. There are no regional left ventricular wall motion abnormalities. The left ventricular cavity size is normal. There is  normal septal and normal posterior left ventricular wall thickness. Left ventricular diastolic filling was not assessed. Left Atrium: The left atrial size was not assessed. Right Ventricle: The right ventricle was not assessed. There is normal right ventricular global systolic function. Right Atrium: The right atrial size was not assessed. The right atrium has a prominent Chiari network. Aortic Valve: The aortic valve is probably trileaflet. There is moderate to severe aortic valve cusp calcification. There is reduced systolic aortic valve leaflet excursion. There is moderate aortic valve regurgitation. Mitral Valve: The mitral valve is mildly thickened. There is moderate to severe mitral annular calcification. There is trace mitral valve regurgitation. Tricuspid Valve: The tricuspid valve is structurally normal. There is mild tricuspid regurgitation. The Doppler estimated RVSP is mildly elevated at 37.1 mmHg. Pulmonic Valve: The pulmonic valve is structurally normal. There is trace pulmonic valve regurgitation. Pericardium: Small to moderate pericardial effusion. Aorta: The aortic root was not assessed. Systemic Veins: The inferior vena cava appears normal in size, with IVC inspiratory collapse greater than 50%. In comparison to the previous echocardiogram(s): Compared with study dated 2/15/2025, no significant change.  CONCLUSIONS:  1. Left ventricular ejection fraction is normal, calculated by Geiger's biplane at 68%.  2. There is normal right ventricular global systolic function.  3. Mildly elevated right ventricular systolic pressure.  4. There is moderate to severe aortic valve cusp calcification.  5. Moderate aortic valve regurgitation.  6. There is moderate to severe mitral annular calcification.  7. Small to moderate pericardial effusion.  8. Compared with study dated 2/15/2025, no significant change. QUANTITATIVE DATA SUMMARY:  2D MEASUREMENTS:          Normal Ranges: IVSd:            0.70 cm  (0.6-1.1cm)  LVPWd:           0.80 cm  (0.6-1.1cm) LVIDd:           4.30 cm  (3.9-5.9cm) LVIDs:           3.20 cm LV Mass Index:   58 g/m2 LVEDV Index:     43 ml/m2 LV % FS          25.6 %  LV SYSTOLIC FUNCTION:                      Normal Ranges: EF-A4C View:    61 % (>=55%) EF-A2C View:    76 % EF-Biplane:     68 % LV EF Reported: 68 %  RIGHT VENTRICLE: TAPSE: 22.8 mm RV s'  0.15 m/s  TRICUSPID VALVE/RVSP:          Normal Ranges: Peak TR Velocity:     2.92 m/s Est. RA Pressure:     3 mmHg RV Syst Pressure:     37 mmHg  (< 30mmHg) IVC Diam:             2.10 cm  [id number] Romie Perera MD Electronically signed on 2/18/2025 at 6:36:51 PM  ** Final **     XR chest 1 view    Result Date: 2/18/2025  Interpreted By:  Nitish Niteo, STUDY: XR CHEST 1 VIEW;  2/17/2025 12:13 pm   INDICATION: Signs/Symptoms:new O2 requirement, assess for pneumonia vs pulmonary edema.   COMPARISON: Chest radiograph dated 02/14/2025   ACCESSION NUMBER(S): [id number]   ORDERING CLINICIAN: SAMIRA TSE   FINDINGS: AP radiograph of the chest   Right lower lobe infiltrate/opacity and peribronchial thickening is more conspicuous than on prior study 02/14/2025.   The heart and mediastinum are stable in comparison to previous study. There is atheromatous disease of the thoracic aorta.       1. Right lower lobe infiltrate peribronchial thickening and consolidation. Pneumonia/Aspiration probable.   Signed by: Nitish Nieto 2/18/2025 12:56 PM Dictation workstation:   [id number]              Assessment/Plan   Assessment & Plan  Mediastinal hematoma, initial encounter    Other emphysema (Multi)    Moderate aortic stenosis    Mr. Dakin is a 79 y.o. 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 Placement of LCx stent was c/b retrograde left main dissection with chest pain and hypotension to SBP 50s. Cath team deployed second stent to LM to cover the lesions. He was resuscitated with 2L LR, 1 unit pRBC, 1 unit platelets, and 1 unit cryo. He was also started on levophed. He was then transferred to CICU where TTE showed no signs of tamponade and CTA showed stable mediastinal and pericardial hemorrhage. He was started on DAPT for LCx stent. He has been stable off levophed since 2/12. Post-PCI he had elevated LFTs and leukocytosis, which have been downtrending since, suspect shock liver and reactive leukocytosis. Metoprolol succinate 12.5 mg daily was started on 2/14. He continued to require 2L NC on 2/14 so CXR was obtained showing R basilar edema/effusions c/w hypervolemia, and he was diuresed with lasix 40 mg BID through 2/17 when patient appeared euvolemic with softer BP. Repeat TTE 2/15 stable compared to 2/12 with small anterior pericardial effusion. Patient continued to require 2L O2 to maintain SpO2 > 92%. Repeat CXR on 2/17 c/f possible RLL pneumonia iso increasing leukocytosis and increased productive cough, so started CTX (2/17 - 2/21) and azithromycin (2/17 - 2/19). Repeat TTE on 2/18 stable with EF 68%, small to moderate pericardial effusion, IVC inspiratory collapse > 50%, severe AV and MV calcifications, moderate AR. Respiratory status improving with pneumonia treatment, pending updated PT recs.     2/19 Updates:  - Today is day 3/5 CTX and day 3/3 azithromycin  - WBC normalized today  - Patient now on 1L NC (required 2L NC yesterday), will continue to encourage IS use and wean O2 as able  - Hyponatremia improving 129 > 131  - 2/18 TTE stable with no signs of tamponade  - PT to re-assess today as patient requiring 2 person assist to transfer from bed to chair and family expressing concern for navigating steps at home  - LFTs  continue to downtrend     #Severe COPD  #Community acquired pneumonia  #Current tobacco user  :: 1/2025 PFTs with FEV1 0.59 L (24%) c/w severe airflow obstruction  :: 2/17 CXR with c/f possible RLL pneumonia iso increasing leukocytosis and increased productive cough  :: 2/17 RSV, flu, covid negative  :: 2/17 sputum culture with salivary contamination  - formoterol BID + tiotropium BID   - guaifenesin BID  - albuterol nebs PRN  - nicotine patch + gum for smoking cessation, plan to continue on discharge  - 5 days CTX (2/17 - 2/19) and 3 days azithromycin (2/17 - 2/19) for empiric CAP treatment  - Incentive spirometry, wean FiO2 to SpO2 > 92%     # Severe MVD s/p complex PCI to Lcx and LM c/b LM dissection (original plan for PCI ot LAD, Lcx, and RCA)  # Mediastinal and pericardial hemorrhage 2/2 LM dissection  # Thoracic and abdominal aortic aneurysms (3.6 cm and 5.4 cm respectively)  # Moderate aortic stenosis and regurgitation - stable per intracath TTE  # HTN  :: 12/2024 cath with 90% stenosis of Lcx and RCA, 80% stenosis of RPL, 70% stenosis of LAD, and < 10 % stenosis of LM   :: 2/11 cath with 90% stenosis of proximal Lcx and 60% stenosis of LAD s/p PCI to Lcx and LM c/b LM dissection  :: 2/12 CTA with slight improvement in mediastinal and pericardial hemorrhage  :: 2/12 TTE without signs of tamponade, EF 60-65%, mod-severe aortic valve calcification, moderate AS  :: 2/15 TTE EF 60-65%, normal RV function, small anterior pericardial effusion without tamponade, valvular function not assessed  :: 2/18 TTE stable with EF 68%, small to moderate pericardial effusion, IVC inspiratory collapse > 50%, severe AV and MV calcifications, moderate AR  :: assessed by CT surgery while in CICU, no surgical plans  -aspirin and plavix post stent  -started metoprolol 12.5 mg daily on 2/14  -holding home felodipine 10 mg and olmesartan 20 mg daily iso normotension  -holding home rosuvastatin and zetia (held due to liver injury) iso  elevated LFTs     #Hyponatremia, improving  :: baseline 134-135  - 2/16 Sosm 266 L, Uosm 598 H, Lorena 16 L c/w hypovolemic vs dilutional, suspect hypovolemic given soft BP and euvolemia on exam  - starting to uptrend (127 > 129 > 131) now that patient appears euvolemic    #Constipation, resolved  - miralax daily  - doc-senna 1 tab qhs     #DVT ppx  - lovenox daily     #Dispo  [ ] PT/OT recommending low intensity on 2/13, requested repeat assessment today 2/19 given patient requiring 2-person assist to transfer from bed to chair  - Cardiology follow-up scheduled for 2/28  - Pulmonology follow-up requested  - Vascular surgery follow-up requested (AAA repair)  - Structural heart follow-up requested (TAVR)     F: PRN   E: PRN   N: Cardiac, 2-3g Na  Access/Tubes: PIV  Antimicrobials: none  DVT prophylaxis: lovenox     Code status: DNR / ok for intubation or ICU (confirmed on transfer to floor)  NOK: Dakin,Sally (Spouse) [phone] (Mobile)        Annalee Tucker MD  Neurology, PGY-1

## 2025-02-19 NOTE — PROGRESS NOTES
Occupational Therapy                 Therapy Communication Note    Patient Name: Mickey Dakin  MRN: [id number]  Department: [place] [address]  Room: [address] [place] [address]  Today's Date: 2/19/2025     Discipline: Occupational Therapy    OT Missed Visit: Yes     Missed Visit Reason: Missed Visit Reason: Patient sleeping    Missed Time: Attempt at 1430    Comment:

## 2025-02-19 NOTE — PROGRESS NOTES
02/19/25 1214   Rapid Rounds   Attendance Provider;Nurse;Care Transitions;Pharmacist   Expected Discharge Disposition Home H   Today we still await: Clinical stability   Review at Escalation Rounds Clinically complex

## 2025-02-19 NOTE — PROGRESS NOTES
Physical Therapy    Physical Therapy Treatment    Patient Name: Mickey Dakin  MRN: [id number]  Department: [place] [address]  Room: [address] [place] [address]  Today's Date: 2/19/2025  Time Calculation  Start Time: 1014  Stop Time: 1105  Time Calculation (min): 51 min         Assessment/Plan   PT Assessment  PT Assessment Results: Decreased strength, Decreased endurance, Impaired balance, Decreased mobility  End of Session Communication: Bedside nurse  Assessment Comment: Pt ambulated 45' x2 trials with prolonged seated rest between trials. Pt with desat to 86% during ambulation, SpO2 still at 87-88% with prolonged seated rest. Increased O2 to 3L pt's SpO2 improved to >92% in 1-2 minutes. Pt reported SOB throughout treatment and required frequent rest. Discussed pt's progress with supervising PT, PT approved to change pt's discharge rec from low to Mod intensity therapy.  End of Session Patient Position: Bed, 3 rail up, Alarm off, not on at start of session     PT Plan  Treatment/Interventions: Bed mobility, Transfer training, Gait training, Stair training, Balance training, Strengthening, Endurance training, Therapeutic exercise, Therapeutic activity, Home exercise program, Positioning  PT Plan: Ongoing PT  PT Frequency: 3 times per week  PT Discharge Recommendations: Moderate intensity level of continued care  PT Recommended Transfer Status: Assist x1  PT - OK to Discharge: Yes      General Visit Information:   PT  Visit  PT Received On: 02/19/25  General  Prior to Session Communication: Bedside nurse  Patient Position Received: Bed, 3 rail up, Alarm off, not on at start of session  General Comment: Pt supine in bed upon arrival. Pt pleasant and agreeable to therapy.    Subjective   Precautions:  Precautions  Hearing/Visual Limitations: WFL  Medical Precautions: Cardiac precautions, Fall precautions, Oxygen therapy device and L/min (on 2L O2)  Precautions Comment: MAP>65; SpO2>90%      Vital Signs Comment: Pt with desat to 86% during  ambulation, SpO2 still at 87-88% with prolonged seated rest. Increased O2 to 3L pt's SpO2 improved to >92% in 1-2 minutes. Pt back on 2L O2 post treatment in supine.     Objective   Pain:     Cognition:  Cognition  Overall Cognitive Status: Within Functional Limits  Coordination:       Activity Tolerance:  Activity Tolerance  Endurance: Tolerates 10 - 20 min exercise with multiple rests  Treatments:  Therapeutic Exercise  Therapeutic Exercise Performed: Yes  Therapeutic Exercise Activity 1: Seated B LE AROM LAQ, hip flexion/adduction, heel raises, DF x10 with breaks between exercises.    Therapeutic Activity  Therapeutic Activity Performed: Yes  Therapeutic Activity 1: Pt completed bed mobility, funcitonal transfers, and gait training.  Therapeutic Activity 2: Pt educated in proper log roll technique and the importance of the log roll to maintain precautions.  Therapeutic Activity 3: Pt's vitals monitored throughout treatment.    Bed Mobility  Bed Mobility: Yes  Bed Mobility 1  Bed Mobility 1: Supine to sitting  Level of Assistance 1: Minimum assistance  Bed Mobility Comments 1: cues for sequenicng, log roll  Bed Mobility 2  Bed Mobility  2: Sitting to supine  Level of Assistance 2: Moderate assistance  Bed Mobility Comments 2: cues for log roll    Ambulation/Gait Training  Ambulation/Gait Training Performed: Yes  Ambulation/Gait Training 1  Surface 1: Level tile  Device 1: Rolling walker  Assistance 1: Contact guard  Quality of Gait 1: Inconsistent stride length, Forward flexed posture, Decreased step length  Comments/Distance (ft) 1: 45' x2 with prolonged seated rest between trials  Transfers  Transfer: Yes  Transfer 1  Transfer From 1: Sit to, Stand to  Transfer to 1: Stand, Sit  Technique 1: Sit to stand, Stand to sit  Transfer Device 1: Walker  Transfer Level of Assistance 1: Contact guard  Trials/Comments 1: cues for hand placement, pt attempts to pull from FWW, pt unsteady with initial stand    Outcome  Measures:  [facility] Basic Mobility  Turning from your back to your side while in a flat bed without using bedrails: A little  Moving from lying on your back to sitting on the side of a flat bed without using bedrails: A little  Moving to and from bed to chair (including a wheelchair): A little  Standing up from a chair using your arms (e.g. wheelchair or bedside chair): A little  To walk in hospital room: A little  Climbing 3-5 steps with railing: Total  Basic Mobility - Total Score: 16    Education Documentation  Mobility Training, taught by Isabel Ortiz PTA at 2/19/2025  3:51 PM.  Learner: Patient  Readiness: Acceptance  Method: Explanation  Response: Verbalizes Understanding  Comment: Educated pt in log roll.    Education Comments  No comments found.        OP EDUCATION:       Encounter Problems       Encounter Problems (Active)       Balance       Pt will demonstrate improved sitting/standing static/dynamic balance activities without LOB via score of 24/28 on the Tinetti for increase in safety prior to DC.  (Progressing)       Start:  02/13/25    Expected End:  02/27/25               Mobility       Pt will tolerate >15 minutes of upright standing activity without seated rest breaks with no changes in vital signs for improved functional mobility.  (Progressing)       Start:  02/13/25    Expected End:  02/27/25            Pt will ambulate >100ft with appropriate form, CGA or less, LRAD, and no LOB for safe DC.  (Progressing)       Start:  02/13/25    Expected End:  02/27/25            Pt will ambulate up/down 3 stairs without hand rail with CGA or less to safely access their home upon DC.   (Progressing)       Start:  02/13/25    Expected End:  02/27/25               PT Transfers       Pt will perform bed mobility and sit<>stand transfers independently to safely DC.  (Progressing)       Start:  02/13/25    Expected End:  02/27/25

## 2025-02-19 NOTE — PROGRESS NOTES
Occupational Therapy                 Therapy Communication Note    Patient Name: Mickey Dakin  MRN: [id number]  Department: [place] [address]  Room: [address] [place], [place] [id number]  Today's Date: 2/19/2025     Discipline: Occupational Therapy    OT Missed Visit: Yes     Missed Visit Reason: Missed Visit Reason: Patient refused (Pt politely scottyed d/t faitgue & just getting back to bed. Will re-attempt when schedule permits.)    Missed Time: Attempt at 1150

## 2025-02-19 NOTE — SIGNIFICANT EVENT
Rapid Response Nurse Note: RADAR alert: 6    Pager time: 13  Arrival time: 16  Event end time: 21  Location: [place] [address]  [x] Triage by phone or secure messaging    Rapid response initiated by:  [] Rapid response RN [] Family [] Nursing Supervisor [] Physician   [x] RADAR auto page [] Sepsis auto-page [] RN [] RT   [] NP/PA [] Other:     Primary reason for call:   [] BAT [] New CPAP/BiPAP [] Bleeding [] Change in mental status   [] Chest pain [] Code blue [] FiO2 >/= 50% [] HR </= 40 bpm   [] HR >/= 130 bpm [] Hyperglycemia [] Hypoglycemia [x] RADAR    [] RR </= 8 bpm [] RR >/= 30 bpm [] SBP </= 90 mmHg [] SpO2 < 90%   [] Seizure [] Sepsis [] Shortness of breath  [] Staff concern: see comments     RADAR VS: T 36.0 °C; HR 70; RR 17; /91; SPO2 93% on supplemental oxygen.    Interventions:  [x] None [] ABG/VBG [] Assist w/ICU transfer [] BAT paged    [] Bag mask [] Blood [] Cardioversion [] Code Blue   [] Code blue for intubation [] Code status changed [] Chest x-ray [] EKG   [] IV fluid/bolus [] KUB x-ray [] Labs/cultures [] Medication   [] Nebulizer treatment [] NIPPV (CPAP/BiPAP) [] Oxygen [] Oral airway   [] Peripheral IV [] Palliative care consult [] CT/MRI [] Sepsis protocol    [] Suctioned [] Other:     Outcome:  [] Coded and  [] Code blue for intubation [] Coded and transferred to ICU []  on division   [x] Remained on division (no change) [] Remained on division + additional monitoring [] Remained in ED [] Transferred to ED   [] Transferred to ICU [] Transferred to inpatient status [] Transferred for interventions (procedure) [] Transferred to ICU stepdown    [] Transferred to surgery [] Transferred to telemetry [] Sepsis protocol [] STEMI protocol   [] Stroke protocol [x] Bedside nurse instructed to page rapid response for any concerns or acute change in condition/VS     Additional Comments: Reviewed above RADAR VS with bedside RN via phone.  Vital signs within patient's current trends.   Staff to page rapid response for any concerns or acute change in condition or VS.

## 2025-02-19 NOTE — CARE PLAN
The patient's goals for the shift include Patient will be able to be on room air    The clinical goals for the shift include Pt's pulse ox will remain >92% throughout shift.    Over the shift, the patient worked with PT and ambulated with walker which he was very fatigued afterwards,  He sat up in chair for Breakfast and Dinner. He continues on IV ATBC's and had CXR completed today.  Over all with the exception of feeling tired this am with the activity he feels good.  Patient medicated for right knee pain with some relief.  Patient remained safe free from falls and injury this shift.       Problem: Safety - Adult  Goal: Free from fall injury  Outcome: Progressing     Problem: Discharge Planning  Goal: Discharge to home or other facility with appropriate resources  Outcome: Progressing     Problem: Chronic Conditions and Co-morbidities  Goal: Patient's chronic conditions and co-morbidity symptoms are monitored and maintained or improved  Outcome: Progressing     Problem: Nutrition  Goal: Nutrient intake appropriate for maintaining nutritional needs  Outcome: Progressing     Problem: Fall/Injury  Goal: Not fall by end of shift  Outcome: Progressing  Goal: Be free from injury by end of the shift  Outcome: Progressing  Goal: Verbalize understanding of personal risk factors for fall in the hospital  Outcome: Progressing  Goal: Verbalize understanding of risk factor reduction measures to prevent injury from fall in the home  Outcome: Progressing  Goal: Use assistive devices by end of the shift  Outcome: Progressing  Goal: Pace activities to prevent fatigue by end of the shift  Outcome: Progressing

## 2025-02-20 LAB
ALBUMIN SERPL BCP-MCNC: 3.1 G/DL (ref 3.4–5)
ALP SERPL-CCNC: 76 U/L (ref 33–136)
ALT SERPL W P-5'-P-CCNC: 214 U/L (ref 10–52)
ANION GAP SERPL CALC-SCNC: 10 MMOL/L
AST SERPL W P-5'-P-CCNC: 45 U/L (ref 9–39)
BASOPHILS # BLD AUTO: 0.06 X10*3/UL (ref 0–0.1)
BASOPHILS NFR BLD AUTO: 0.7 %
BILIRUB SERPL-MCNC: 0.8 MG/DL (ref 0–1.2)
BUN SERPL-MCNC: 10 MG/DL (ref 6–23)
CALCIUM SERPL-MCNC: 8.6 MG/DL (ref 8.6–10.6)
CHLORIDE SERPL-SCNC: 93 MMOL/L (ref 98–107)
CO2 SERPL-SCNC: 33 MMOL/L (ref 21–32)
CREAT SERPL-MCNC: 0.39 MG/DL (ref 0.5–1.3)
EGFRCR SERPLBLD CKD-EPI 2021: >90 ML/MIN/1.73M*2
EOSINOPHIL # BLD AUTO: 0.12 X10*3/UL (ref 0–0.4)
EOSINOPHIL NFR BLD AUTO: 1.5 %
ERYTHROCYTE [DISTWIDTH] IN BLOOD BY AUTOMATED COUNT: 13.3 % (ref 11.5–14.5)
GLUCOSE SERPL-MCNC: 96 MG/DL (ref 74–99)
HCT VFR BLD AUTO: 36.9 % (ref 41–52)
HGB BLD-MCNC: 12.1 G/DL (ref 13.5–17.5)
IMM GRANULOCYTES # BLD AUTO: 0.03 X10*3/UL (ref 0–0.5)
IMM GRANULOCYTES NFR BLD AUTO: 0.4 % (ref 0–0.9)
LYMPHOCYTES # BLD AUTO: 1.28 X10*3/UL (ref 0.8–3)
LYMPHOCYTES NFR BLD AUTO: 15.6 %
MAGNESIUM SERPL-MCNC: 2.04 MG/DL (ref 1.6–2.4)
MCH RBC QN AUTO: 31.9 PG (ref 26–34)
MCHC RBC AUTO-ENTMCNC: 32.8 G/DL (ref 32–36)
MCV RBC AUTO: 97 FL (ref 80–100)
MONOCYTES # BLD AUTO: 0.72 X10*3/UL (ref 0.05–0.8)
MONOCYTES NFR BLD AUTO: 8.7 %
NEUTROPHILS # BLD AUTO: 6.02 X10*3/UL (ref 1.6–5.5)
NEUTROPHILS NFR BLD AUTO: 73.1 %
NRBC BLD-RTO: 0 /100 WBCS (ref 0–0)
PHOSPHATE SERPL-MCNC: 3.1 MG/DL (ref 2.5–4.9)
PLATELET # BLD AUTO: 334 X10*3/UL (ref 150–450)
POTASSIUM SERPL-SCNC: 4.3 MMOL/L (ref 3.5–5.3)
PROT SERPL-MCNC: 5.4 G/DL (ref 6.4–8.2)
RBC # BLD AUTO: 3.79 X10*6/UL (ref 4.5–5.9)
SODIUM SERPL-SCNC: 132 MMOL/L (ref 136–145)
WBC # BLD AUTO: 8.2 X10*3/UL (ref 4.4–11.3)

## 2025-02-20 PROCEDURE — 97530 THERAPEUTIC ACTIVITIES: CPT | Mod: GO

## 2025-02-20 PROCEDURE — 80053 COMPREHEN METABOLIC PANEL: CPT

## 2025-02-20 PROCEDURE — 99232 SBSQ HOSP IP/OBS MODERATE 35: CPT | Performed by: INTERNAL MEDICINE

## 2025-02-20 PROCEDURE — 85025 COMPLETE CBC W/AUTO DIFF WBC: CPT

## 2025-02-20 PROCEDURE — 97530 THERAPEUTIC ACTIVITIES: CPT | Mod: GP

## 2025-02-20 PROCEDURE — 36415 COLL VENOUS BLD VENIPUNCTURE: CPT

## 2025-02-20 PROCEDURE — 2500000001 HC RX 250 WO HCPCS SELF ADMINISTERED DRUGS (ALT 637 FOR MEDICARE OP): Performed by: STUDENT IN AN ORGANIZED HEALTH CARE EDUCATION/TRAINING PROGRAM

## 2025-02-20 PROCEDURE — 2500000001 HC RX 250 WO HCPCS SELF ADMINISTERED DRUGS (ALT 637 FOR MEDICARE OP)

## 2025-02-20 PROCEDURE — 2500000004 HC RX 250 GENERAL PHARMACY W/ HCPCS (ALT 636 FOR OP/ED)

## 2025-02-20 PROCEDURE — 2500000005 HC RX 250 GENERAL PHARMACY W/O HCPCS

## 2025-02-20 PROCEDURE — 94640 AIRWAY INHALATION TREATMENT: CPT

## 2025-02-20 PROCEDURE — S4991 NICOTINE PATCH NONLEGEND: HCPCS

## 2025-02-20 PROCEDURE — 84100 ASSAY OF PHOSPHORUS: CPT

## 2025-02-20 PROCEDURE — 1200000002 HC GENERAL ROOM WITH TELEMETRY DAILY

## 2025-02-20 PROCEDURE — 2500000002 HC RX 250 W HCPCS SELF ADMINISTERED DRUGS (ALT 637 FOR MEDICARE OP, ALT 636 FOR OP/ED)

## 2025-02-20 PROCEDURE — 83735 ASSAY OF MAGNESIUM: CPT

## 2025-02-20 PROCEDURE — 97535 SELF CARE MNGMENT TRAINING: CPT | Mod: GO

## 2025-02-20 RX ADMIN — ACETAMINOPHEN 650 MG: 325 TABLET ORAL at 14:59

## 2025-02-20 RX ADMIN — FORMOTEROL FUMARATE DIHYDRATE 20 MCG: 20 SOLUTION RESPIRATORY (INHALATION) at 11:02

## 2025-02-20 RX ADMIN — LIDOCAINE 4% 1 PATCH: 40 PATCH TOPICAL at 08:13

## 2025-02-20 RX ADMIN — GUAIFENESIN 600 MG: 600 TABLET ORAL at 20:26

## 2025-02-20 RX ADMIN — ACETAMINOPHEN 650 MG: 325 TABLET ORAL at 20:26

## 2025-02-20 RX ADMIN — NICOTINE 1 PATCH: 14 PATCH, EXTENDED RELEASE TRANSDERMAL at 08:16

## 2025-02-20 RX ADMIN — POLYETHYLENE GLYCOL 3350 17 G: 17 POWDER, FOR SOLUTION ORAL at 08:13

## 2025-02-20 RX ADMIN — ALBUTEROL SULFATE 2.5 MG: 2.5 SOLUTION RESPIRATORY (INHALATION) at 11:00

## 2025-02-20 RX ADMIN — METOPROLOL SUCCINATE 12.5 MG: 25 TABLET, EXTENDED RELEASE ORAL at 08:13

## 2025-02-20 RX ADMIN — ASPIRIN 81 MG: 81 TABLET, COATED ORAL at 08:13

## 2025-02-20 RX ADMIN — GUAIFENESIN 600 MG: 600 TABLET ORAL at 08:13

## 2025-02-20 RX ADMIN — CEFTRIAXONE SODIUM 1 G: 1 INJECTION, SOLUTION INTRAVENOUS at 14:14

## 2025-02-20 RX ADMIN — TIOTROPIUM BROMIDE INHALATION SPRAY 2 PUFF: 3.12 SPRAY, METERED RESPIRATORY (INHALATION) at 11:02

## 2025-02-20 RX ADMIN — SENNOSIDES AND DOCUSATE SODIUM 1 TABLET: 50; 8.6 TABLET ORAL at 20:26

## 2025-02-20 RX ADMIN — CLOPIDOGREL BISULFATE 75 MG: 75 TABLET ORAL at 06:30

## 2025-02-20 RX ADMIN — ACETAMINOPHEN 650 MG: 325 TABLET ORAL at 06:30

## 2025-02-20 RX ADMIN — ENOXAPARIN SODIUM 40 MG: 100 INJECTION SUBCUTANEOUS at 20:26

## 2025-02-20 ASSESSMENT — COGNITIVE AND FUNCTIONAL STATUS - GENERAL
TOILETING: A LOT
DAILY ACTIVITIY SCORE: 18
PERSONAL GROOMING: A LITTLE
DRESSING REGULAR UPPER BODY CLOTHING: A LITTLE
CLIMB 3 TO 5 STEPS WITH RAILING: A LOT
MOVING TO AND FROM BED TO CHAIR: A LITTLE
DRESSING REGULAR LOWER BODY CLOTHING: A LOT
HELP NEEDED FOR BATHING: A LITTLE
STANDING UP FROM CHAIR USING ARMS: A LITTLE
TURNING FROM BACK TO SIDE WHILE IN FLAT BAD: A LITTLE
DRESSING REGULAR UPPER BODY CLOTHING: A LITTLE
WALKING IN HOSPITAL ROOM: A LOT
TOILETING: TOTAL
PERSONAL GROOMING: A LITTLE
HELP NEEDED FOR BATHING: A LOT
DRESSING REGULAR LOWER BODY CLOTHING: A LITTLE
DAILY ACTIVITIY SCORE: 15
MOBILITY SCORE: 17

## 2025-02-20 ASSESSMENT — ACTIVITIES OF DAILY LIVING (ADL)
BATHING_LEVEL_OF_ASSISTANCE: MODERATE ASSISTANCE
BATHING_WHERE_ASSESSED: OTHER (COMMENT)
HOME_MANAGEMENT_TIME_ENTRY: 30

## 2025-02-20 ASSESSMENT — PAIN - FUNCTIONAL ASSESSMENT
PAIN_FUNCTIONAL_ASSESSMENT: 0-10

## 2025-02-20 ASSESSMENT — COLUMBIA-SUICIDE SEVERITY RATING SCALE - C-SSRS
1. SINCE LAST CONTACT, HAVE YOU WISHED YOU WERE DEAD OR WISHED YOU COULD GO TO SLEEP AND NOT WAKE UP?: NO
2. HAVE YOU ACTUALLY HAD ANY THOUGHTS OF KILLING YOURSELF?: NO
6. HAVE YOU EVER DONE ANYTHING, STARTED TO DO ANYTHING, OR PREPARED TO DO ANYTHING TO END YOUR LIFE?: NO

## 2025-02-20 ASSESSMENT — PAIN SCALES - GENERAL
PAINLEVEL_OUTOF10: 0 - NO PAIN
PAINLEVEL_OUTOF10: 0 - NO PAIN
PAINLEVEL_OUTOF10: 3
PAINLEVEL_OUTOF10: 2

## 2025-02-20 ASSESSMENT — PAIN DESCRIPTION - DESCRIPTORS: DESCRIPTORS: ACHING

## 2025-02-20 NOTE — SIGNIFICANT EVENT
Mr. Dakin had several episodes of coughing while eating burger this admission. Patient subsequently placed on easy to chew diet. Patient and family requested that patient be placed back on prior diet given that he has been coughing even when not eating iso pneumonia. Patient and family informed of possible aspiration pneumonia risk and are willing to accept this risk.

## 2025-02-20 NOTE — PROGRESS NOTES
"Mickey Dakin is a 79 y.o. male on day 9 of admission presenting with Mediastinal hematoma, initial encounter.    Subjective     NAEON. Denies chest pain, SOB, abdominal pain. Endorses R arm swelling, denies pain, numbness, weakness. Last BM 2 days ago.       Objective     Physical Exam   Gen: NAD, elderly man sitting in chair comfortably  Resp: diminished breath sounds bilaterally, no wheezes/crackles, normal respiratory effort on 2L NC, pursed lip breathing with minimal accessory muscle use  CV: RRR, no murmurs  GI: soft, nontender, nondistended  MSK: no edema BLE, bruised RUE with pitting edema, compartments soft, capilary refill < 2 seconds,  strength 5/5  Skin: scattered bruises on bilateral arms  Psych: appropriate mood and affec    Last Recorded Vitals  Blood pressure 109/65, pulse 86, temperature 36.5 °C (97.7 °F), temperature source Temporal, resp. rate 18, height 1.676 m (5' 6\"), weight 58.3 kg (128 lb 9.6 oz), SpO2 95%.  Intake/Output last 3 Shifts:  I/O last 3 completed shifts:  In: 900 (15.4 mL/kg) [P.O.:900]  Out: 2700 (46.3 mL/kg) [Urine:2700 (1.3 mL/kg/hr)]  Weight: 58.3 kg     Medications   Scheduled medications   [Held by provider] amLODIPine, 10 mg, oral, Daily  aspirin, 81 mg, oral, Daily  cefTRIAXone, 1 g, intravenous, q24h  clopidogrel, 75 mg, oral, Daily  enoxaparin, 40 mg, subcutaneous, q24h  [Held by provider] ezetimibe, 10 mg, oral, Daily  formoterol, 20 mcg, nebulization, BID  guaiFENesin, 600 mg, oral, BID  lidocaine, 1 patch, transdermal, Daily  metoprolol succinate XL, 12.5 mg, oral, Daily  nicotine, 1 patch, transdermal, Daily   Followed by  [START ON 3/29/2025] nicotine, 1 patch, transdermal, Daily  perflutren protein A microsphere, 0.5 mL, intravenous, Once in imaging  polyethylene glycol, 17 g, oral, Daily  polyethylene glycol, 17 g, oral, Once  [Held by provider] rosuvastatin, 40 mg, oral, Nightly  sennosides-docusate sodium, 1 tablet, oral, Nightly  sulfur hexafluoride " microsphr, 2 mL, intravenous, Once in imaging  tiotropium, 2 puff, inhalation, Daily  [Held by provider] valsartan, 160 mg, oral, Daily      PRN medications   PRN medications: acetaminophen **OR** acetaminophen **OR** acetaminophen, albuterol, dextrose, dextrose, glucagon, glucagon, nicotine polacrilex, ondansetron, oxygen, sodium chloride     Recent labs  WBC   Date Value Ref Range Status   02/20/2025 8.2 4.4 - 11.3 x10*3/uL Final   02/19/2025 9.4 4.4 - 11.3 x10*3/uL Final   02/18/2025 13.5 (H) 4.4 - 11.3 x10*3/uL Final     Hemoglobin   Date Value Ref Range Status   02/20/2025 12.1 (L) 13.5 - 17.5 g/dL Final   02/19/2025 12.4 (L) 13.5 - 17.5 g/dL Final   02/18/2025 13.0 (L) 13.5 - 17.5 g/dL Final     Platelets   Date Value Ref Range Status   02/20/2025 334 150 - 450 x10*3/uL Final   02/19/2025 262 150 - 450 x10*3/uL Final   02/18/2025 237 150 - 450 x10*3/uL Final     Glucose   Date Value Ref Range Status   02/20/2025 96 74 - 99 mg/dL Final   02/19/2025 107 (H) 74 - 99 mg/dL Final   02/18/2025 71 (L) 74 - 99 mg/dL Final     Sodium   Date Value Ref Range Status   02/20/2025 132 (L) 136 - 145 mmol/L Final   02/19/2025 131 (L) 136 - 145 mmol/L Final   02/18/2025 129 (L) 136 - 145 mmol/L Final     Potassium   Date Value Ref Range Status   02/20/2025 4.3 3.5 - 5.3 mmol/L Final   02/19/2025 4.4 3.5 - 5.3 mmol/L Final     Comment:     MILD HEMOLYSIS DETECTED. The result may be falsely elevated due to hemolysis or other interferents. Clinical correlation is recommended. Repeat testing may be considered.   02/18/2025 4.4 3.5 - 5.3 mmol/L Final     Chloride   Date Value Ref Range Status   02/20/2025 93 (L) 98 - 107 mmol/L Final   02/19/2025 92 (L) 98 - 107 mmol/L Final   02/18/2025 88 (L) 98 - 107 mmol/L Final     Urea Nitrogen   Date Value Ref Range Status   02/20/2025 10 6 - 23 mg/dL Final   02/19/2025 10 6 - 23 mg/dL Final   02/18/2025 10 6 - 23 mg/dL Final     Creatinine   Date Value Ref Range Status   02/20/2025 0.39 (L)  0.50 - 1.30 mg/dL Final   02/19/2025 0.44 (L) 0.50 - 1.30 mg/dL Final   02/18/2025 0.41 (L) 0.50 - 1.30 mg/dL Final     Magnesium   Date Value Ref Range Status   02/20/2025 2.04 1.60 - 2.40 mg/dL Final   02/19/2025 2.10 1.60 - 2.40 mg/dL Final   02/18/2025 2.08 1.60 - 2.40 mg/dL Final     Phosphorus   Date Value Ref Range Status   02/20/2025 3.1 2.5 - 4.9 mg/dL Final     Comment:     The performance characteristics of phosphorus testing in heparinized plasma have been validated by the individual  laboratory site where testing is performed. Testing on heparinized plasma is not approved by the FDA; however, such approval is not necessary.   02/19/2025 2.7 2.5 - 4.9 mg/dL Final     Comment:     MILD HEMOLYSIS DETECTED. The result may be falsely elevated due to hemolysis or other interferents. Clinical correlation is recommended. Repeat testing may be considered.  The performance characteristics of phosphorus testing in heparinized plasma have been validated by the individual  laboratory site where testing is performed. Testing on heparinized plasma is not approved by the FDA; however, such approval is not necessary.   02/18/2025 2.4 (L) 2.5 - 4.9 mg/dL Final     Comment:     The performance characteristics of phosphorus testing in heparinized plasma have been validated by the individual  laboratory site where testing is performed. Testing on heparinized plasma is not approved by the FDA; however, such approval is not necessary.     Calcium   Date Value Ref Range Status   02/20/2025 8.6 8.6 - 10.6 mg/dL Final   02/19/2025 8.4 (L) 8.6 - 10.6 mg/dL Final   02/18/2025 8.6 8.6 - 10.6 mg/dL Final     Albumin   Date Value Ref Range Status   02/20/2025 3.1 (L) 3.4 - 5.0 g/dL Final   02/19/2025 2.8 (L) 3.4 - 5.0 g/dL Final   02/18/2025 3.1 (L) 3.4 - 5.0 g/dL Final         Imaging   XR chest 1 view    Result Date: 2/20/2025  Interpreted By:  Nitish Nieto, STUDY: XR CHEST 1 VIEW;  2/19/2025 4:13 pm   INDICATION:  Signs/Symptoms:concern for aspiration.   COMPARISON: Chest radiograph dated 02/17/2025; CT chest 02/12/2025   ACCESSION NUMBER(S): [id number]   ORDERING CLINICIAN: SAMIRA TSE   FINDINGS: AP radiograph of the chest   The heart is normal in size. There is atheromatous disease of the aortic arch. Bilateral lower lobe opacities are noted greater within right lower lobe than left. Blunting of costophrenic sulci are noted. No pneumothorax is demonstrated.       1. Right lower lobe consolidation appears more organized than on prior study 02/17/2025 2. Increased opacities within the lower lobe segments bilaterally. Small bilateral pleural effusions and subsegmental atelectasis greater within right lower lobe than left.       Signed by: Nitish Nieto 2/20/2025 11:02 AM Dictation workstation:   [id number]    Transthoracic Echo (TTE) Limited    Result Date: 2/18/2025   [facility], [address] [place] [address]                Tel [phone] and Fax [phone] TRANSTHORACIC ECHOCARDIOGRAM REPORT  Patient Name:       MICKEY DAKIN        Reading Physician:    [id number] Perera MD Study Date:         2/18/2025           Ordering Provider:    [id number] SAMIRA TSE MRN/PID:            [id number]            Fellow: Accession#:         [id number]        Nurse: Date of Birth/Age:  1945 / 79      Sonographer:          Miguel A clemens                                     WANDA Gender assigned at  M                   Additional Staff: Birth: Height:             167.64 cm           Admit Date: Weight:             58.97 kg            Admission Status:     Inpatient -                                                               Routine BSA / BMI:          1.67 m2 / 20.98     Encounter#:           [id number]                     kg/m2 Blood Pressure:      143/68 mmHg         Department Location:  [place] [address] Study Type:    TRANSTHORACIC ECHO (TTE) LIMITED Diagnosis/ICD: Encounter for screening for cardiovascular disorders-Z13.6 Indication:    effusion CPT Code:      Doppler Limited-99710; Echo Limited-83127; Color Doppler-18322 Patient History: Pertinent History: Mediastinal hematoma, COPD, CAD, AAA, CP. Study Detail: The following Echo studies were performed: 2D, M-Mode, Doppler and               color flow. Technically challenging study due to patient lying in               supine position, body habitus and prominent lung artifact.               Definity used as a contrast agent for endocardial border               definition. Total contrast used for this procedure was 2 mL via IV               push.  PHYSICIAN INTERPRETATION: Left Ventricle: Left ventricular ejection fraction is normal, calculated by Geiger's biplane at 68%. There are no regional left ventricular wall motion abnormalities. The left ventricular cavity size is normal. There is normal septal and normal posterior left ventricular wall thickness. Left ventricular diastolic filling was not assessed. Left Atrium: The left atrial size was not assessed. Right Ventricle: The right ventricle was not assessed. There is normal right ventricular global systolic function. Right Atrium: The right atrial size was not assessed. The right atrium has a prominent Chiari network. Aortic Valve: The aortic valve is probably trileaflet. There is moderate to severe aortic valve cusp calcification. There is reduced systolic aortic valve leaflet excursion. There is moderate aortic valve regurgitation. Mitral Valve: The mitral valve is mildly thickened. There is moderate to severe mitral annular calcification. There is trace mitral valve regurgitation. Tricuspid Valve: The tricuspid valve is structurally normal. There is mild tricuspid regurgitation. The  Doppler estimated RVSP is mildly elevated at 37.1 mmHg. Pulmonic Valve: The pulmonic valve is structurally normal. There is trace pulmonic valve regurgitation. Pericardium: Small to moderate pericardial effusion. Aorta: The aortic root was not assessed. Systemic Veins: The inferior vena cava appears normal in size, with IVC inspiratory collapse greater than 50%. In comparison to the previous echocardiogram(s): Compared with study dated 2/15/2025, no significant change.  CONCLUSIONS:  1. Left ventricular ejection fraction is normal, calculated by Geiger's biplane at 68%.  2. There is normal right ventricular global systolic function.  3. Mildly elevated right ventricular systolic pressure.  4. There is moderate to severe aortic valve cusp calcification.  5. Moderate aortic valve regurgitation.  6. There is moderate to severe mitral annular calcification.  7. Small to moderate pericardial effusion.  8. Compared with study dated 2/15/2025, no significant change. QUANTITATIVE DATA SUMMARY:  2D MEASUREMENTS:          Normal Ranges: IVSd:            0.70 cm  (0.6-1.1cm) LVPWd:           0.80 cm  (0.6-1.1cm) LVIDd:           4.30 cm  (3.9-5.9cm) LVIDs:           3.20 cm LV Mass Index:   58 g/m2 LVEDV Index:     43 ml/m2 LV % FS          25.6 %  LV SYSTOLIC FUNCTION:                      Normal Ranges: EF-A4C View:    61 % (>=55%) EF-A2C View:    76 % EF-Biplane:     68 % LV EF Reported: 68 %  RIGHT VENTRICLE: TAPSE: 22.8 mm RV s'  0.15 m/s  TRICUSPID VALVE/RVSP:          Normal Ranges: Peak TR Velocity:     2.92 m/s Est. RA Pressure:     3 mmHg RV Syst Pressure:     37 mmHg  (< 30mmHg) IVC Diam:             2.10 cm  [id number] Romie Perera MD Electronically signed on 2/18/2025 at 6:36:51 PM  ** Final **               Assessment/Plan   Assessment & Plan  Mediastinal hematoma, initial encounter    Other emphysema (Multi)    Moderate aortic stenosis    Mr. Dakin is a 79 y.o. man with PMHx TAA (3.6 cm), AAA (5.4 cm), moderate  aortic stenosis and regurgitation, and recently diagnosed severe COPD (1/2025 PFTs with FEV1 0.59 L (24%) c/w severe airflow obstruction) who presented to [facility] on 2/11/2025 for complex PCI of LAD, LCx, and RCA for severe MVD iso heavily calcified ascending aorta precluding clamping and high-risk comorbidities precluding CABG. Placement of LCx stent was c/b retrograde left main dissection with chest pain and hypotension to SBP 50s. Cath team deployed second stent to LM to cover the lesions. He was resuscitated with 2L LR, 1 unit pRBC, 1 unit platelets, and 1 unit cryo. He was also started on levophed. He was then transferred to CICU where TTE showed no signs of tamponade and CTA showed stable mediastinal and pericardial hemorrhage. He was started on DAPT for LCx stent. He has been stable off levophed since 2/12. Post-PCI he had elevated LFTs and leukocytosis, which have been downtrending since, suspect shock liver and reactive leukocytosis. Metoprolol succinate 12.5 mg daily was started on 2/14. He continued to require 2L NC on 2/14 so CXR was obtained showing R basilar edema/effusions c/w hypervolemia, and he was diuresed with lasix 40 mg BID through 2/17 when patient appeared euvolemic with softer BP. Repeat TTE 2/15 stable compared to 2/12 with small anterior pericardial effusion. Patient continued to require 2L O2 to maintain SpO2 > 92%. Repeat CXR on 2/17 c/f possible RLL pneumonia iso increasing leukocytosis and increased productive cough, so started CTX (2/17 - 2/21) and azithromycin (2/17 - 2/19). Repeat TTE on 2/18 stable with EF 68%, small to moderate pericardial effusion, IVC inspiratory collapse > 50%, severe AV and MV calcifications, moderate AR. Leukocytosis resolved with pneumonia treatment. Walked with PT on 2/19, required frequent breaks, desatted to 86% on 2L while walking, increased to 92% on 3L. PT recommending moderate intensity.     2/20 Updates:  - Today is day 4/5 CTX   - PT recommending  moderate intensity, SW started SNF selection process  - Remains on 2L NC, required 3L NC while walking with PT - patient may need home O2 pending improvement with moderate intensity rehab  - Hyponatremia improving 131 > 132  - LFTs continue to downtrend     #Severe COPD  #Community acquired pneumonia  #Current tobacco user  :: 1/2025 PFTs with FEV1 0.59 L (24%) c/w severe airflow obstruction  :: 2/17 CXR with c/f possible RLL pneumonia iso increasing leukocytosis and increased productive cough  :: 2/17 RSV, flu, covid negative  :: 2/17 sputum culture with salivary contamination  - formoterol BID + tiotropium BID   - guaifenesin BID  - albuterol nebs PRN  - nicotine patch + gum for smoking cessation, plan to continue on discharge  - 5 days CTX (2/17 - 2/19) and 3 days azithromycin (2/17 - 2/19) for empiric CAP treatment  - Incentive spirometry, wean FiO2 to SpO2 > 92%     # Severe MVD s/p complex PCI to Lcx and LM c/b LM dissection (original plan for PCI ot LAD, Lcx, and RCA)  # Mediastinal and pericardial hemorrhage 2/2 LM dissection  # Thoracic and abdominal aortic aneurysms (3.6 cm and 5.4 cm respectively)  # Moderate aortic stenosis and regurgitation - stable per intracath TTE  # HTN  :: 12/2024 cath with 90% stenosis of Lcx and RCA, 80% stenosis of RPL, 70% stenosis of LAD, and < 10 % stenosis of LM   :: 2/11 cath with 90% stenosis of proximal Lcx and 60% stenosis of LAD s/p PCI to Lcx and LM c/b LM dissection  :: 2/12 CTA with slight improvement in mediastinal and pericardial hemorrhage  :: 2/12 TTE without signs of tamponade, EF 60-65%, mod-severe aortic valve calcification, moderate AS  :: 2/15 TTE EF 60-65%, normal RV function, small anterior pericardial effusion without tamponade, valvular function not assessed  :: 2/18 TTE stable with EF 68%, small to moderate pericardial effusion, IVC inspiratory collapse > 50%, severe AV and MV calcifications, moderate AR  :: assessed by CT surgery while in CICU, no  surgical plans  -aspirin and plavix post stent  -started metoprolol 12.5 mg daily on 2/14  -holding home felodipine 10 mg and olmesartan 20 mg daily iso normotension  -holding home rosuvastatin and zetia (held due to liver injury) iso elevated LFTs     #Hyponatremia, improving  :: baseline 134-135  - 2/16 Sosm 266 L, Uosm 598 H, Lorena 16 L c/w hypovolemic vs dilutional, suspect hypovolemic given soft BP and euvolemia on exam  - starting to uptrend (127 > 129 > 131) now that patient appears euvolemic    #Constipation, resolved  - miralax daily  - doc-senna 1 tab qhs     #DVT ppx  - lovenox daily     #Dispo  [ ] PT/OT recommending moderate intensity  [ ] will need PCP follow-up requested, will need interval imaging to follow RUL spiculated nodule seen on 1/2025 outpatient imaging  - Cardiology follow-up scheduled for 2/28  - Pulmonology follow-up requested  - Vascular surgery follow-up requested (AAA repair)  - Structural heart follow-up requested (TAVR)     F: PRN   E: PRN   N: Cardiac, 2-3g Na  Access/Tubes: PIV  Antimicrobials: none  DVT prophylaxis: lovenox     Code status: DNR / ok for intubation or ICU (confirmed on transfer to floor)  NOK: Dakin,Sally (Spouse) [phone] (Mobile)        Annalee Tucker MD  Neurology, PGY-1

## 2025-02-20 NOTE — PROGRESS NOTES
02/20/25 1227   Rapid Rounds   Attendance Provider;Nurse;Care Transitions   Expected Discharge Disposition SNF   Today we still await: Clinical stability;Diagnostic workup;Symptomatic control   Review at Escalation Rounds No escalation needed

## 2025-02-20 NOTE — PROGRESS NOTES
"Physical Therapy    Physical Therapy Treatment    Patient Name: Mickey Dakin  MRN: [id number]  Department: [facility] [id number]  Room: [address] [place] [address]  Today's Date: 2/20/2025  Time Calculation  Start Time: 1012  Stop Time: 1058  Time Calculation (min): 46 min    Assessment/Plan   PT Assessment  End of Session Communication: Bedside nurse  Assessment Comment: Patient tolerated session well but patient continues to demonstrate fatigue with activity and frequent and extended rest breaks. Patient has decreased endurance and would benefit from mod intensity therapy at [place] to address endurance and functional mobility.  End of Session Patient Position: Bed, 3 rail up, Alarm off, not on at start of session     PT Plan  Treatment/Interventions: Bed mobility, Transfer training, Gait training, Stair training, Balance training, Strengthening, Endurance training, Therapeutic exercise, Therapeutic activity, Home exercise program, Positioning  PT Plan: Ongoing PT  PT Frequency: 3 times per week  PT Discharge Recommendations: Moderate intensity level of continued care  PT Recommended Transfer Status: Assist x1  PT - OK to Discharge: Yes      General Visit Information:   PT  Visit  PT Received On: 02/20/25  Response to Previous Treatment: Patient with no complaints from previous session.  General  Family/Caregiver Present: Yes  Caregiver Feedback: wife present and supportive reporting \"im so happy you both came together!\"  Co-Treatment: OT  Co-Treatment Reason: maximize pt safety and function, participation in therapy sessions as pt only tolerates one therapy session.  Prior to Session Communication: Bedside nurse  Patient Position Received: Bed, 3 rail up, Alarm off, not on at start of session  General Comment: pt supine in bed, RN cleared. appears to have some nerves with moving but cooperative and pleasant.    Subjective   Precautions:  Precautions  Medical Precautions: Cardiac precautions, Fall precautions, Oxygen therapy device and L/min " (2L)     Date/Time Vitals Session Patient Position Pulse Resp SpO2 BP MAP (mmHg)    02/20/25 1100 --  --  --  --  97 %  --  --           Vital Signs Comment: 91% after 3 min stand at sink, 93-96% remainder of time. HR 91-96bpm     Objective   Pain:  Pain Assessment  Pain Assessment: 0-10  0-10 (Numeric) Pain Score: 0 - No pain  Cognition:  Cognition  Overall Cognitive Status: Within Functional Limits  Orientation Level: Oriented X4  Problem Solving:  (clear instructions prior to tasks and cues given to complete)  Processing Speed:  (slight delayed, cues required to complete tasks)  Coordination:     Postural Control:  Static Standing Balance  Static Standing-Comment/Number of Minutes: min A after using toilet due to fatigue  Dynamic Standing Balance  Dynamic Standing-Comments: 3.5 min standing at sink to complete ADL's- CGA    Activity Tolerance:  Activity Tolerance  Endurance:  (rest breaks required for activity)  Treatments:  Therapeutic Activity  Therapeutic Activity Performed: Yes  Therapeutic Activity 1: pt completed sup > sit EOB, stood from EOB and ambulated to restroom. stood at sink to complete ADL's with CGA for balance.  Therapeutic Activity 2: seated rest in chair, then ambulated 3' to toilet, stood from toilet and ambulated 5' back towards bed. seated rest.  Therapeutic Activity 3: ambulated additional 5' to return to bed, assist LEs to supine.  Therapeutic Activity 4: extended rest breaks required due to fatigue. cues for tasks. assist in ADL's    Bed Mobility  Bed Mobility: Yes  Bed Mobility 1  Bed Mobility 1: Supine to sitting  Level of Assistance 1: Contact guard  Bed Mobility Comments 1: HOB elevated  Bed Mobility 2  Bed Mobility  2: Sitting to supine  Level of Assistance 2: Minimum assistance  Bed Mobility Comments 2: assist with LEs    Ambulation/Gait Training  Ambulation/Gait Training Performed: Yes  Ambulation/Gait Training 1  Surface 1: Level tile  Device 1: Rolling walker  Assistance 1:  Minimum assistance  Quality of Gait 1: Decreased step length, Shuffling gait (decreased jabari)  Comments/Distance (ft) 1: pt ambulated 10' to restroom, stood then seated rest break in chair. amb 3' to toilet, then 5' to chair, rest, 5' to bed. cues for walker management and positioning  Transfers  Transfer: Yes  Transfer 1  Transfer From 1: Sit to, Stand to  Transfer to 1: Stand, Sit  Technique 1: Sit to stand, Stand to sit  Transfer Device 1: Walker  Transfer Level of Assistance 1: Minimum assistance (CGA from EOB, min A from chair (twice) and toilet.)  Trials/Comments 1: cues for hand placement during transfers.    Outcome Measures:       Education Documentation  Body Mechanics, taught by Mary Zamudio PT at 2/20/2025 11:28 AM.  Learner: Patient  Readiness: Acceptance  Method: Explanation  Response: Verbalizes Understanding  Comment: edu on role of PT, importance of OOB to chair and commode.    Mobility Training, taught by Mary Zamudio PT at 2/20/2025 11:28 AM.  Learner: Patient  Readiness: Acceptance  Method: Explanation  Response: Verbalizes Understanding  Comment: edu on role of PT, importance of OOB to chair and commode.    Education Comments  No comments found.        OP EDUCATION:       Encounter Problems       Encounter Problems (Active)       Balance       Pt will demonstrate improved sitting/standing static/dynamic balance activities without LOB via score of 24/28 on the [place]etti for increase in safety prior to DC.  (Progressing)       Start:  02/13/25    Expected End:  02/27/25               Mobility       Pt will tolerate >15 minutes of upright standing activity without seated rest breaks with no changes in vital signs for improved functional mobility.  (Progressing)       Start:  02/13/25    Expected End:  02/27/25            Pt will ambulate >100ft with appropriate form, CGA or less, LRAD, and no LOB for safe DC.  (Progressing)       Start:  02/13/25    Expected End:  02/27/25            Pt will  ambulate up/down 3 stairs without hand rail with CGA or less to safely access their home upon DC.   (Progressing)       Start:  02/13/25    Expected End:  02/27/25               PT Transfers       Pt will perform bed mobility and sit<>stand transfers independently to safely DC.  (Progressing)       Start:  02/13/25    Expected End:  02/27/25

## 2025-02-20 NOTE — PROGRESS NOTES
Occupational Therapy    Occupational Therapy Treatment    Name: Mickey Dakin  MRN: [id number]  : 1945  Date: 25  Room: [address] [place] [address]A      Time Calculation  Start Time: 1012  Stop Time: 1058  Time Calculation (min): 46 min    Assessment:  OT Assessment: Decreased activity tolerance/endurance, strength & balance limiting his functional performance/independence with ADLs, transfers & mobility. Pt would benefit from continued skilled OT intervention to address deficits and facilitate return to PLOF. Changed rec to Mod intensity this date.  Prognosis: Good  Barriers to Discharge Home: Caregiver assistance, Physical needs  Caregiver Assistance: Caregiver assistance needed per identified barriers - however, level of patient's required assistance exceeds assistance available at home  Physical Needs: Ambulating household distances limited by function/safety, 24hr mobility assistance needed, 24hr ADL assistance needed  Evaluation/Treatment Tolerance: Patient tolerated treatment well  Medical Staff Made Aware: Yes  End of Session Communication: Bedside nurse  End of Session Patient Position: Bed, 3 rail up, Alarm off, not on at start of session  Plan:  Treatment Interventions: ADL retraining, Functional transfer training, UE strengthening/ROM, Endurance training, Patient/family training, Equipment evaluation/education, Compensatory technique education  OT Frequency: 3 times per week  OT Discharge Recommendations: Moderate intensity level of continued care  Equipment Recommended upon Discharge: Wheeled walker  OT Recommended Transfer Status: Assist of 1, Assistive equipment (Comment) (rolling walker)  OT - OK to Discharge: Yes    Subjective   General:  OT Last Visit  OT Received On: 25  Reason for Referral: 80 y/o male initially presented to [facility] for elective complex PCI procedure for MVD 25.  Had high risk PCI with Lcx stent inserted.  Past Medical History Relevant to Rehab: Infrarenal AAA, severe MVD,  moderate aortic stenosis and regurgitation, severe COPD, past stroke  Co-Treatment: PT  Co-Treatment Reason: maximize pt safety and function, participation in therapy sessions as pt only tolerates one therapy session.  Prior to Session Communication: Bedside nurse  Patient Position Received: Bed, 3 rail up, Alarm off, not on at start of session  Family/Caregiver Present: Yes  Caregiver Feedback: wife present & supportive during therapy  General Comment: Pt pleasant & receptive to therapy, requires frequent rest breaks to manage fatigue during functional task completion.   Precautions:  Medical Precautions: Cardiac precautions, Fall precautions, Oxygen therapy device and L/min (2L via NC)  Vitals:   Date/Time Vitals Session Patient Position Pulse Resp SpO2 BP MAP (mmHg)    02/20/25 1013 During OT  --  93  --  91 %  --  --     02/20/25 1100 --  --  --  --  97 %  --  --           Lines/Tubes/Drains:  External Urinary Catheter Male (Active)   Number of days: 8       Cognition:  Overall Cognitive Status: Within Functional Limits  Orientation Level: Oriented X4  Problem Solving:  (Pt requires min verbal cues during functional task for sequencing)    Pain Assessment:  Pain Assessment  Pain Assessment: 0-10  0-10 (Numeric) Pain Score: 0 - No pain     Objective   Activities of Daily Living:      Grooming  Grooming Level of Assistance: Contact guard  Grooming Where Assessed: Standing sinkside  Grooming Comments: Pt performed oral hygiene and face washing at sink, min  verbal cues for initiation of tasks & sequencing.  UE Bathing  UE Bathing Level of Assistance: Close supervision, Setup  UE Bathing Where Assessed: Sitting sinkside  UE Bathing Comments: Pt washed UB with bath wipes  LE Bathing  LE Bathing Level of Assistance: Moderate assistance  LE Bathing Where Assessed: Other (Comment) (seated in chair)  LE Bathing Comments: increased assist to complete d/t fatigue near end of session  UE Dressing  UE Dressing Level of  Assistance: Minimum assistance, Minimal verbal cues  UE Dressing Where Assessed: Chair  UE Dressing Comments: cued for initiation  LE Dressing  LE Dressing: Yes  Sock Level of Assistance: Maximum assistance  LE Dressing Where Assessed: Chair  LE Dressing Comments: doff/don socks  Toileting  Toileting Level of Assistance: Dependent  Where Assessed: Toilet  Toileting Comments: total assist to complete posterior saúl-care s/p BM    Functional Standing Tolerance:  Functional Standing Tolerance  Time: 3 min 30 seconds  Activity: engaging in grooming tasks standing at sink  Functional Standing Tolerance Comments: Pt stood with CGA, utilized sink or walker for UE support  Functional Mobility  Functional Mobility Performed: Yes  Functional Mobility 1  Surface 1: Level tile  Device 1: Rolling walker  Assistance 1: Minimum assistance, Moderate verbal cues  Comments 1: Pt performed functional mobility EOB<>bathroom, verbal cues for safe walker management & to stay in close proximity. Pt requires seated rest break bathroom?bed d/t fatigue. chair follow for safety.  Bed Mobility/Transfers:   Bed Mobility  Bed Mobility: Yes  Bed Mobility 1  Bed Mobility 1: Supine to sitting  Level of Assistance 1: Contact guard, Minimal verbal cues  Bed Mobility 2  Bed Mobility  2: Sitting to supine  Level of Assistance 2: Minimum assistance, Minimal verbal cues  Transfers  Transfer: Yes  Transfer 1  Transfer From 1: Bed to, Chair with arms to  Transfer to 1: Stand  Technique 1: Sit to stand  Transfer Device 1: Walker  Transfer Level of Assistance 1: Minimum assistance, Minimal verbal cues  Trials/Comments 1: cues for hand placement  Transfers 2  Transfer From 2: Stand to  Transfer to 2: Bed, Chair with arms  Technique 2: Stand to sit  Transfer Device 2: Walker  Transfer Level of Assistance 2: Minimum assistance  Trials/Comments 2: cued for hand placement and body proximity to sitting surface prior to initiating sit.  Transfers 3  Transfer From  3: Sit to, Stand to  Transfer to 3: Toilet  Technique 3: Sit to stand, Stand to sit  Transfer Device 3: Walker (grab bar)  Transfer Level of Assistance 3: Minimum assistance, Minimal verbal cues  Toilet Transfers  Toilet Transfer From: Chair  Toilet Transfer Type: To  Toilet Transfer to: Standard toilet  Toilet Transfer Technique: Ambulating  Toilet Transfers: Minimal assistance     Balance:  Static Standing Balance  Static Standing-Level of Assistance:  (inital CGA, regressed to Min A with fatigue. UE support at W.)    Therapy/Activity:      Therapeutic Activity  Therapeutic Activity Performed: Yes  Therapeutic Activity 1: Pt education on energy conservation techniques during funcitonal task completion to improve safety & ability to complete tasks without over exertion.    Outcome Measures:  [facility] Daily Activity  Putting on and taking off regular lower body clothing: A lot  Bathing (including washing, rinsing, drying): A lot  Putting on and taking off regular upper body clothing: A little  Toileting, which includes using toilet, bedpan or urinal: Total  Taking care of personal grooming such as brushing teeth: A little  Eating Meals: None  Daily Activity - Total Score: 15     Education Documentation  Body Mechanics, taught by Quin Aly OT at 2/20/2025 11:39 AM.  Learner: Patient  Readiness: Acceptance  Method: Explanation  Response: Verbalizes Understanding  Comment: OT POC, energy conservation, safe transfer/mobility techniques, ADL compensatory strategies    Precautions, taught by Quin Aly OT at 2/20/2025 11:39 AM.  Learner: Patient  Readiness: Acceptance  Method: Explanation  Response: Verbalizes Understanding  Comment: OT POC, energy conservation, safe transfer/mobility techniques, ADL compensatory strategies    ADL Training, taught by Quin Aly OT at 2/20/2025 11:39 AM.  Learner: Patient  Readiness: Acceptance  Method: Explanation  Response: Verbalizes Understanding  Comment: OT POC, energy  conservation, safe transfer/mobility techniques, ADL compensatory strategies    Education Comments  No comments found.        Goals:  Encounter Problems       Encounter Problems (Active)       ADLs       Patient will complete lower body dressing with MOD I  for donning and doffing all LE clothes in order to increase Indep with task participation.  (Progressing)       Start:  02/13/25    Expected End:  02/27/25            Patient will complete toileting, including clothing management and hygiene, with MOD I in order to maximize functional Indep with task completion.  (Progressing)       Start:  02/13/25    Expected End:  02/27/25               COGNITION/SAFETY       Pt will identify and incorporate 3 EC/WS strategies into daily routines for increased safety and Indep.  (Progressing)       Start:  02/13/25    Expected End:  02/27/25               EXERCISE/STRENGTHENING       Pt will increase overall BUE strength to 4/5 in order to increase functional task participation.  (Progressing)       Start:  02/13/25    Expected End:  02/27/25            Pt will increase endurance to tolerate 15-20min of activity with no more than 1 rest break in order to increase ability to engage in ADL completion.  (Progressing)       Start:  02/13/25    Expected End:  02/27/25               MOBILITY       Pt will demo increased functional mobility to tolerate tasks necessary to complete ADL routine with MOD I. (Progressing)       Start:  02/13/25    Expected End:  02/27/25 02/20/25 at 11:42 AM   Quin Aly, OT   [phone]

## 2025-02-21 VITALS
WEIGHT: 127.65 LBS | HEART RATE: 74 BPM | RESPIRATION RATE: 18 BRPM | HEIGHT: 66 IN | OXYGEN SATURATION: 96 % | DIASTOLIC BLOOD PRESSURE: 69 MMHG | SYSTOLIC BLOOD PRESSURE: 131 MMHG | BODY MASS INDEX: 20.51 KG/M2 | TEMPERATURE: 97.2 F

## 2025-02-21 PROBLEM — R09.81 NASAL CONGESTION: Status: ACTIVE | Noted: 2025-02-21

## 2025-02-21 PROBLEM — K59.00 CONSTIPATION: Status: ACTIVE | Noted: 2025-02-21

## 2025-02-21 PROBLEM — R52 PAIN: Status: ACTIVE | Noted: 2025-02-21

## 2025-02-21 PROBLEM — J44.9 COPD (CHRONIC OBSTRUCTIVE PULMONARY DISEASE) (MULTI): Status: ACTIVE | Noted: 2025-02-15

## 2025-02-21 LAB
ALBUMIN SERPL BCP-MCNC: 3.1 G/DL (ref 3.4–5)
ALP SERPL-CCNC: 71 U/L (ref 33–136)
ALT SERPL W P-5'-P-CCNC: 171 U/L (ref 10–52)
ANION GAP SERPL CALC-SCNC: 12 MMOL/L (ref 10–20)
AST SERPL W P-5'-P-CCNC: 45 U/L (ref 9–39)
BASOPHILS # BLD AUTO: 0.07 X10*3/UL (ref 0–0.1)
BASOPHILS NFR BLD AUTO: 1 %
BILIRUB SERPL-MCNC: 0.5 MG/DL (ref 0–1.2)
BUN SERPL-MCNC: 10 MG/DL (ref 6–23)
CALCIUM SERPL-MCNC: 8.5 MG/DL (ref 8.6–10.6)
CHLORIDE SERPL-SCNC: 93 MMOL/L (ref 98–107)
CO2 SERPL-SCNC: 31 MMOL/L (ref 21–32)
CREAT SERPL-MCNC: 0.44 MG/DL (ref 0.5–1.3)
EGFRCR SERPLBLD CKD-EPI 2021: >90 ML/MIN/1.73M*2
EOSINOPHIL # BLD AUTO: 0.17 X10*3/UL (ref 0–0.4)
EOSINOPHIL NFR BLD AUTO: 2.3 %
ERYTHROCYTE [DISTWIDTH] IN BLOOD BY AUTOMATED COUNT: 13.4 % (ref 11.5–14.5)
GLUCOSE SERPL-MCNC: 92 MG/DL (ref 74–99)
HCT VFR BLD AUTO: 37.1 % (ref 41–52)
HGB BLD-MCNC: 12 G/DL (ref 13.5–17.5)
IMM GRANULOCYTES # BLD AUTO: 0.03 X10*3/UL (ref 0–0.5)
IMM GRANULOCYTES NFR BLD AUTO: 0.4 % (ref 0–0.9)
LYMPHOCYTES # BLD AUTO: 1.39 X10*3/UL (ref 0.8–3)
LYMPHOCYTES NFR BLD AUTO: 19 %
MAGNESIUM SERPL-MCNC: 2.11 MG/DL (ref 1.6–2.4)
MCH RBC QN AUTO: 32.2 PG (ref 26–34)
MCHC RBC AUTO-ENTMCNC: 32.3 G/DL (ref 32–36)
MCV RBC AUTO: 100 FL (ref 80–100)
MONOCYTES # BLD AUTO: 0.84 X10*3/UL (ref 0.05–0.8)
MONOCYTES NFR BLD AUTO: 11.5 %
NEUTROPHILS # BLD AUTO: 4.83 X10*3/UL (ref 1.6–5.5)
NEUTROPHILS NFR BLD AUTO: 65.8 %
NRBC BLD-RTO: 0 /100 WBCS (ref 0–0)
PHOSPHATE SERPL-MCNC: 3 MG/DL (ref 2.5–4.9)
PLATELET # BLD AUTO: 316 X10*3/UL (ref 150–450)
POTASSIUM SERPL-SCNC: 4.6 MMOL/L (ref 3.5–5.3)
PROT SERPL-MCNC: 5.1 G/DL (ref 6.4–8.2)
RBC # BLD AUTO: 3.73 X10*6/UL (ref 4.5–5.9)
SODIUM SERPL-SCNC: 131 MMOL/L (ref 136–145)
WBC # BLD AUTO: 7.3 X10*3/UL (ref 4.4–11.3)

## 2025-02-21 PROCEDURE — S4991 NICOTINE PATCH NONLEGEND: HCPCS

## 2025-02-21 PROCEDURE — 85025 COMPLETE CBC W/AUTO DIFF WBC: CPT

## 2025-02-21 PROCEDURE — 2500000004 HC RX 250 GENERAL PHARMACY W/ HCPCS (ALT 636 FOR OP/ED)

## 2025-02-21 PROCEDURE — 80053 COMPREHEN METABOLIC PANEL: CPT

## 2025-02-21 PROCEDURE — 36415 COLL VENOUS BLD VENIPUNCTURE: CPT

## 2025-02-21 PROCEDURE — 2500000002 HC RX 250 W HCPCS SELF ADMINISTERED DRUGS (ALT 637 FOR MEDICARE OP, ALT 636 FOR OP/ED)

## 2025-02-21 PROCEDURE — 99239 HOSP IP/OBS DSCHRG MGMT >30: CPT

## 2025-02-21 PROCEDURE — 2500000001 HC RX 250 WO HCPCS SELF ADMINISTERED DRUGS (ALT 637 FOR MEDICARE OP)

## 2025-02-21 PROCEDURE — 2500000005 HC RX 250 GENERAL PHARMACY W/O HCPCS

## 2025-02-21 PROCEDURE — 2500000001 HC RX 250 WO HCPCS SELF ADMINISTERED DRUGS (ALT 637 FOR MEDICARE OP): Performed by: STUDENT IN AN ORGANIZED HEALTH CARE EDUCATION/TRAINING PROGRAM

## 2025-02-21 PROCEDURE — 84100 ASSAY OF PHOSPHORUS: CPT

## 2025-02-21 PROCEDURE — 83735 ASSAY OF MAGNESIUM: CPT

## 2025-02-21 RX ORDER — LIDOCAINE 560 MG/1
1 PATCH PERCUTANEOUS; TOPICAL; TRANSDERMAL DAILY PRN
Start: 2025-02-21 | End: 2025-03-23

## 2025-02-21 RX ORDER — MICONAZOLE NITRATE 2 %
2 CREAM (GRAM) TOPICAL EVERY 2 HOUR PRN
Qty: 100 EACH | Refills: 0 | Status: SHIPPED | OUTPATIENT
Start: 2025-02-21 | End: 2025-02-21

## 2025-02-21 RX ORDER — MICONAZOLE NITRATE 2 %
2 CREAM (GRAM) TOPICAL EVERY 2 HOUR PRN
Start: 2025-02-21

## 2025-02-21 RX ORDER — GUAIFENESIN 600 MG/1
600 TABLET, EXTENDED RELEASE ORAL 2 TIMES DAILY
Start: 2025-02-21 | End: 2025-03-23

## 2025-02-21 RX ORDER — AMOXICILLIN 250 MG
1 CAPSULE ORAL NIGHTLY
Start: 2025-02-21 | End: 2025-03-23

## 2025-02-21 RX ORDER — METOPROLOL SUCCINATE 25 MG/1
12.5 TABLET, EXTENDED RELEASE ORAL DAILY
Start: 2025-02-21 | End: 2025-02-28 | Stop reason: SDUPTHER

## 2025-02-21 RX ORDER — POLYETHYLENE GLYCOL 3350 17 G/17G
17 POWDER, FOR SOLUTION ORAL DAILY
Start: 2025-02-21 | End: 2025-03-23

## 2025-02-21 RX ORDER — IBUPROFEN 200 MG
1 TABLET ORAL DAILY
Start: 2025-02-21 | End: 2025-03-29

## 2025-02-21 RX ORDER — CEFTRIAXONE 1 G/50ML
1 INJECTION, SOLUTION INTRAVENOUS ONCE
Status: COMPLETED | OUTPATIENT
Start: 2025-02-21 | End: 2025-02-21

## 2025-02-21 RX ORDER — NICOTINE 7MG/24HR
1 PATCH, TRANSDERMAL 24 HOURS TRANSDERMAL DAILY
Start: 2025-03-29 | End: 2025-04-12

## 2025-02-21 RX ADMIN — ACETAMINOPHEN 650 MG: 325 TABLET ORAL at 08:44

## 2025-02-21 RX ADMIN — FORMOTEROL FUMARATE DIHYDRATE 20 MCG: 20 SOLUTION RESPIRATORY (INHALATION) at 08:54

## 2025-02-21 RX ADMIN — TIOTROPIUM BROMIDE INHALATION SPRAY 2 PUFF: 3.12 SPRAY, METERED RESPIRATORY (INHALATION) at 10:29

## 2025-02-21 RX ADMIN — CLOPIDOGREL BISULFATE 75 MG: 75 TABLET ORAL at 07:04

## 2025-02-21 RX ADMIN — CEFTRIAXONE SODIUM 1 G: 1 INJECTION, SOLUTION INTRAVENOUS at 11:59

## 2025-02-21 RX ADMIN — ACETAMINOPHEN 650 MG: 325 TABLET ORAL at 15:08

## 2025-02-21 RX ADMIN — GUAIFENESIN 600 MG: 600 TABLET ORAL at 08:44

## 2025-02-21 RX ADMIN — ACETAMINOPHEN 650 MG: 325 TABLET ORAL at 04:30

## 2025-02-21 RX ADMIN — LIDOCAINE 4% 1 PATCH: 40 PATCH TOPICAL at 08:53

## 2025-02-21 RX ADMIN — METOPROLOL SUCCINATE 12.5 MG: 25 TABLET, EXTENDED RELEASE ORAL at 08:44

## 2025-02-21 RX ADMIN — ASPIRIN 81 MG: 81 TABLET, COATED ORAL at 08:45

## 2025-02-21 RX ADMIN — NICOTINE 1 PATCH: 14 PATCH, EXTENDED RELEASE TRANSDERMAL at 08:52

## 2025-02-21 ASSESSMENT — COGNITIVE AND FUNCTIONAL STATUS - GENERAL
STANDING UP FROM CHAIR USING ARMS: A LITTLE
MOVING TO AND FROM BED TO CHAIR: A LITTLE
WALKING IN HOSPITAL ROOM: A LOT
HELP NEEDED FOR BATHING: A LITTLE
PERSONAL GROOMING: A LITTLE
DRESSING REGULAR UPPER BODY CLOTHING: A LITTLE
CLIMB 3 TO 5 STEPS WITH RAILING: A LOT
MOBILITY SCORE: 17
DRESSING REGULAR LOWER BODY CLOTHING: A LITTLE
TURNING FROM BACK TO SIDE WHILE IN FLAT BAD: A LITTLE
TOILETING: A LITTLE
DAILY ACTIVITIY SCORE: 19

## 2025-02-21 ASSESSMENT — PAIN DESCRIPTION - ORIENTATION
ORIENTATION: RIGHT

## 2025-02-21 ASSESSMENT — PAIN - FUNCTIONAL ASSESSMENT
PAIN_FUNCTIONAL_ASSESSMENT: 0-10

## 2025-02-21 ASSESSMENT — PAIN SCALES - GENERAL
PAINLEVEL_OUTOF10: 3
PAINLEVEL_OUTOF10: 3
PAINLEVEL_OUTOF10: 0 - NO PAIN
PAINLEVEL_OUTOF10: 3

## 2025-02-21 ASSESSMENT — PAIN DESCRIPTION - LOCATION
LOCATION: KNEE

## 2025-02-21 ASSESSMENT — PAIN DESCRIPTION - DESCRIPTORS
DESCRIPTORS: ACHING
DESCRIPTORS: ACHING

## 2025-02-21 NOTE — CARE PLAN
The patient's goals for the shift include Patient will be able to be on room air    The clinical goals for the shift include Patient will sit up in chair three times a day for meals    Over the shift, the patient sat up in the chair and ambulated with walker on 2L/NC.  He reports 0/10 pain and no Sob at rest; however does endorse M[place]OWAN after walking too far with fatigue.  Patient to be discharge today to SNF,  time is 14:00.  Patient remained safe free from falls and injury this shift.      Problem: Safety - Adult  Goal: Free from fall injury  Outcome: Progressing     Problem: Discharge Planning  Goal: Discharge to home or other facility with appropriate resources  Outcome: Progressing     Problem: Chronic Conditions and Co-morbidities  Goal: Patient's chronic conditions and co-morbidity symptoms are monitored and maintained or improved  Outcome: Progressing     Problem: Nutrition  Goal: Nutrient intake appropriate for maintaining nutritional needs  Outcome: Progressing     Problem: Fall/Injury  Goal: Not fall by end of shift  Outcome: Progressing  Goal: Be free from injury by end of the shift  Outcome: Progressing  Goal: Verbalize understanding of personal risk factors for fall in the hospital  Outcome: Progressing  Goal: Verbalize understanding of risk factor reduction measures to prevent injury from fall in the home  Outcome: Progressing  Goal: Use assistive devices by end of the shift  Outcome: Progressing  Goal: Pace activities to prevent fatigue by end of the shift  Outcome: Progressing

## 2025-02-21 NOTE — PROGRESS NOTES
"Mickey Dakin is a 79 y.o. male on day 10 of admission presenting with Mediastinal hematoma, initial encounter.    Subjective     NAEON. Denies chest pain, SOB, abdominal pain. Examined sitting up eating breakfast at bedside.       Objective     Physical Exam   Gen: NAD, elderly man sitting in chair comfortably  Resp: diminished breath sounds bilaterally, no wheezes/crackles, normal respiratory effort on 2L NC, minimal accessory muscle use  CV: RRR, no murmurs  GI: soft, nontender, nondistended  MSK: no edema BLE, bruised RUE with pitting edema, compartments soft, capilary refill < 2 seconds,  strength 5/5  Skin: scattered bruises on bilateral arms  Psych: appropriate mood and affec    Last Recorded Vitals  Blood pressure 117/61, pulse 60, temperature 36 °C (96.8 °F), temperature source Temporal, resp. rate 18, height 1.676 m (5' 6\"), weight 57.9 kg (127 lb 10.3 oz), SpO2 98%.  Intake/Output last 3 Shifts:  I/O last 3 completed shifts:  In: 680 (11.7 mL/kg) [P.O.:680]  Out: 3800 (65.6 mL/kg) [Urine:3800 (1.8 mL/kg/hr)]  Weight: 57.9 kg     Medications   Scheduled medications   [Held by provider] amLODIPine, 10 mg, oral, Daily  aspirin, 81 mg, oral, Daily  cefTRIAXone, 1 g, intravenous, q24h  clopidogrel, 75 mg, oral, Daily  enoxaparin, 40 mg, subcutaneous, q24h  [Held by provider] ezetimibe, 10 mg, oral, Daily  formoterol, 20 mcg, nebulization, BID  guaiFENesin, 600 mg, oral, BID  lidocaine, 1 patch, transdermal, Daily  metoprolol succinate XL, 12.5 mg, oral, Daily  nicotine, 1 patch, transdermal, Daily   Followed by  [START ON 3/29/2025] nicotine, 1 patch, transdermal, Daily  perflutren protein A microsphere, 0.5 mL, intravenous, Once in imaging  polyethylene glycol, 17 g, oral, Daily  polyethylene glycol, 17 g, oral, Once  [Held by provider] rosuvastatin, 40 mg, oral, Nightly  sennosides-docusate sodium, 1 tablet, oral, Nightly  sulfur hexafluoride microsphr, 2 mL, intravenous, Once in imaging  tiotropium, 2 " puff, inhalation, Daily  [Held by provider] valsartan, 160 mg, oral, Daily      PRN medications   PRN medications: acetaminophen **OR** acetaminophen **OR** acetaminophen, albuterol, dextrose, dextrose, glucagon, glucagon, nicotine polacrilex, ondansetron, oxygen, sodium chloride     Recent labs  WBC   Date Value Ref Range Status   02/21/2025 7.3 4.4 - 11.3 x10*3/uL Final   02/20/2025 8.2 4.4 - 11.3 x10*3/uL Final   02/19/2025 9.4 4.4 - 11.3 x10*3/uL Final     Hemoglobin   Date Value Ref Range Status   02/21/2025 12.0 (L) 13.5 - 17.5 g/dL Final   02/20/2025 12.1 (L) 13.5 - 17.5 g/dL Final   02/19/2025 12.4 (L) 13.5 - 17.5 g/dL Final     Platelets   Date Value Ref Range Status   02/21/2025 316 150 - 450 x10*3/uL Final   02/20/2025 334 150 - 450 x10*3/uL Final   02/19/2025 262 150 - 450 x10*3/uL Final     Glucose   Date Value Ref Range Status   02/21/2025 92 74 - 99 mg/dL Final   02/20/2025 96 74 - 99 mg/dL Final   02/19/2025 107 (H) 74 - 99 mg/dL Final     Sodium   Date Value Ref Range Status   02/21/2025 131 (L) 136 - 145 mmol/L Final   02/20/2025 132 (L) 136 - 145 mmol/L Final   02/19/2025 131 (L) 136 - 145 mmol/L Final     Potassium   Date Value Ref Range Status   02/21/2025 4.6 3.5 - 5.3 mmol/L Final   02/20/2025 4.3 3.5 - 5.3 mmol/L Final   02/19/2025 4.4 3.5 - 5.3 mmol/L Final     Comment:     MILD HEMOLYSIS DETECTED. The result may be falsely elevated due to hemolysis or other interferents. Clinical correlation is recommended. Repeat testing may be considered.     Chloride   Date Value Ref Range Status   02/21/2025 93 (L) 98 - 107 mmol/L Final   02/20/2025 93 (L) 98 - 107 mmol/L Final   02/19/2025 92 (L) 98 - 107 mmol/L Final     Urea Nitrogen   Date Value Ref Range Status   02/21/2025 10 6 - 23 mg/dL Final   02/20/2025 10 6 - 23 mg/dL Final   02/19/2025 10 6 - 23 mg/dL Final     Creatinine   Date Value Ref Range Status   02/21/2025 0.44 (L) 0.50 - 1.30 mg/dL Final   02/20/2025 0.39 (L) 0.50 - 1.30 mg/dL Final    02/19/2025 0.44 (L) 0.50 - 1.30 mg/dL Final     Magnesium   Date Value Ref Range Status   02/21/2025 2.11 1.60 - 2.40 mg/dL Final   02/20/2025 2.04 1.60 - 2.40 mg/dL Final   02/19/2025 2.10 1.60 - 2.40 mg/dL Final     Phosphorus   Date Value Ref Range Status   02/21/2025 3.0 2.5 - 4.9 mg/dL Final     Comment:     The performance characteristics of phosphorus testing in heparinized plasma have been validated by the individual  laboratory site where testing is performed. Testing on heparinized plasma is not approved by the FDA; however, such approval is not necessary.   02/20/2025 3.1 2.5 - 4.9 mg/dL Final     Comment:     The performance characteristics of phosphorus testing in heparinized plasma have been validated by the individual  laboratory site where testing is performed. Testing on heparinized plasma is not approved by the FDA; however, such approval is not necessary.   02/19/2025 2.7 2.5 - 4.9 mg/dL Final     Comment:     MILD HEMOLYSIS DETECTED. The result may be falsely elevated due to hemolysis or other interferents. Clinical correlation is recommended. Repeat testing may be considered.  The performance characteristics of phosphorus testing in heparinized plasma have been validated by the individual  laboratory site where testing is performed. Testing on heparinized plasma is not approved by the FDA; however, such approval is not necessary.     Calcium   Date Value Ref Range Status   02/21/2025 8.5 (L) 8.6 - 10.6 mg/dL Final   02/20/2025 8.6 8.6 - 10.6 mg/dL Final   02/19/2025 8.4 (L) 8.6 - 10.6 mg/dL Final     Albumin   Date Value Ref Range Status   02/21/2025 3.1 (L) 3.4 - 5.0 g/dL Final   02/20/2025 3.1 (L) 3.4 - 5.0 g/dL Final   02/19/2025 2.8 (L) 3.4 - 5.0 g/dL Final         Imaging   XR chest 1 view    Result Date: 2/20/2025  Interpreted By:  Nitish Nieto, STUDY: XR CHEST 1 VIEW;  2/19/2025 4:13 pm   INDICATION: Signs/Symptoms:concern for aspiration.   COMPARISON: Chest radiograph dated  02/17/2025; CT chest 02/12/2025   ACCESSION NUMBER(S): [id number]   ORDERING CLINICIAN: SAMIRA TSE   FINDINGS: AP radiograph of the chest   The heart is normal in size. There is atheromatous disease of the aortic arch. Bilateral lower lobe opacities are noted greater within right lower lobe than left. Blunting of costophrenic sulci are noted. No pneumothorax is demonstrated.       1. Right lower lobe consolidation appears more organized than on prior study 02/17/2025 2. Increased opacities within the lower lobe segments bilaterally. Small bilateral pleural effusions and subsegmental atelectasis greater within right lower lobe than left.       Signed by: Nitish Nieto 2/20/2025 11:02 AM Dictation workstation:   [id number]              Assessment/Plan   Assessment & Plan  Mediastinal hematoma, initial encounter    COPD (chronic obstructive pulmonary disease) (Multi)    Moderate aortic stenosis    Nasal congestion    Constipation    Pain    Mr. Dakin is a 79 y.o. man with PMHx TAA (3.6 cm), AAA (5.4 cm), moderate aortic stenosis and regurgitation, and recently diagnosed severe COPD (1/2025 PFTs with FEV1 0.59 L (24%) c/w severe airflow obstruction) who presented to [facility] for complex PCI of LAD, LCx, and RCA for severe MVD iso heavily calcified ascending aorta precluding clamping and high-risk comorbidities precluding CABG. Placement of LCx stent was c/b retrograde left main dissection with chest pain and hypotension to SBP 50s. Cath team deployed second stent to LM to cover the lesions. He was resuscitated with 2L LR, 1 unit pRBC, 1 unit platelets, and 1 unit cryo. He was also started on levophed. He was then transferred to CICU where TTE showed no signs of tamponade and CTA showed stable mediastinal and pericardial hemorrhage. He was started on DAPT for LCx stent. He has been stable off levophed since 2/12. Post-PCI he had elevated LFTs and leukocytosis, which have been downtrending since, suspect  shock liver and reactive leukocytosis. Metoprolol succinate 12.5 mg daily was started on 2/14. He continued to require 2L NC on 2/14 so CXR was obtained showing R basilar edema/effusions c/w hypervolemia, and he was diuresed with lasix 40 mg BID through 2/17 when patient appeared euvolemic with softer BP. Repeat TTE 2/15 stable compared to 2/12 with small anterior pericardial effusion. Patient continued to require 2L O2 to maintain SpO2 > 92%. Repeat CXR on 2/17 c/f possible RLL pneumonia iso increasing leukocytosis and increased productive cough, so started CTX (2/17 - 2/21) and azithromycin (2/17 - 2/19). Repeat TTE on 2/18 stable with EF 68%, small to moderate pericardial effusion, IVC inspiratory collapse > 50%, severe AV and MV calcifications, moderate AR. Leukocytosis resolved with pneumonia treatment. Walked with PT on 2/19, required frequent breaks, desatted to 86% on 2L while walking, increased to 92% on 3L. PT recommending moderate intensity.     2/21 Updates:  - Today is day 5/5 CTX   - To be discharged to SNF this afternoon  - Remains on 2L NC, required 3L NC while walking with PT - patient may need home O2 pending improvement with moderate intensity rehab  - LFTs continue to downtrend, will need HFP in one week and can consider restarting Zetia and statin if LFTs normalize     #Severe COPD  #Community acquired pneumonia  #Current tobacco user  :: 1/2025 PFTs with FEV1 0.59 L (24%) c/w severe airflow obstruction  :: 2/17 CXR with c/f possible RLL pneumonia iso increasing leukocytosis and increased productive cough  :: 2/17 RSV, flu, covid negative  :: 2/17 sputum culture with salivary contamination  - formoterol BID + tiotropium BID   - guaifenesin BID  - albuterol nebs PRN  - nicotine patch + gum for smoking cessation, plan to continue on discharge  - 5 days CTX (2/17 - 2/21) and 3 days azithromycin (2/17 - 2/19) for empiric CAP treatment  - Incentive spirometry, wean FiO2 to SpO2 > 92%     # Severe MVD  s/p complex PCI to Lcx and LM c/b LM dissection (original plan for PCI ot LAD, Lcx, and RCA)  # Mediastinal and pericardial hemorrhage 2/2 LM dissection  # Thoracic and abdominal aortic aneurysms (3.6 cm and 5.4 cm respectively)  # Moderate aortic stenosis and regurgitation - stable per intracath TTE  # HTN  :: 12/2024 cath with 90% stenosis of Lcx and RCA, 80% stenosis of RPL, 70% stenosis of LAD, and < 10 % stenosis of LM   :: 2/11 cath with 90% stenosis of proximal Lcx and 60% stenosis of LAD s/p PCI to Lcx and LM c/b LM dissection  :: 2/12 CTA with slight improvement in mediastinal and pericardial hemorrhage  :: 2/12 TTE without signs of tamponade, EF 60-65%, mod-severe aortic valve calcification, moderate AS  :: 2/15 TTE EF 60-65%, normal RV function, small anterior pericardial effusion without tamponade, valvular function not assessed  :: 2/18 TTE stable with EF 68%, small to moderate pericardial effusion, IVC inspiratory collapse > 50%, severe AV and MV calcifications, moderate AR  :: assessed by CT surgery while in CICU, no surgical plans  -aspirin and plavix post stent  -started metoprolol 12.5 mg daily on 2/14  -holding home felodipine 10 mg and olmesartan 20 mg daily iso normotension  -holding home rosuvastatin and zetia (held due to liver injury) iso elevated LFTs     #Hyponatremia, improving  :: baseline 134-135  - 2/16 Sosm 266 L, Uosm 598 H, Lorena 16 L c/w hypovolemic vs dilutional, suspect hypovolemic given soft BP and euvolemia on exam  - starting to uptrend (127 > 129 > 131) now that patient appears euvolemic    #Constipation, resolved  - miralax daily  - doc-senna 1 tab qhs     #DVT ppx  - lovenox daily     #Dispo  [ ] PT/OT recommending moderate intensity  [ ] will need PCP follow-up requested, will need interval imaging to follow RUL spiculated nodule seen on 1/2025 outpatient imaging  - Cardiology follow-up scheduled for 2/28  - Pulmonology follow-up requested  - Vascular surgery follow-up  requested (AAA repair)  - Structural heart follow-up requested (TAVR)     F: PRN   E: PRN   N: Cardiac, 2-3g Na  Access/Tubes: PIV  Antimicrobials: ceftriaxone  DVT prophylaxis: lovenox     Code status: DNR / ok for intubation or ICU (confirmed on transfer to floor)  NOK: Dakin,Sally (Spouse) [phone] (Mobile)        Brody Roberts MD PGY1

## 2025-02-21 NOTE — PROGRESS NOTES
02/21/25 1138   Rapid Rounds   Attendance Provider;Nurse;Care Transitions;Pharmacist   Expected Discharge Disposition SNF   Today we still await: Clinical stability   Review at Escalation Rounds Clinically complex

## 2025-02-21 NOTE — PROGRESS NOTES
Physical Therapy                 Therapy Communication Note    Patient Name: Mickey Dakin  MRN: [id number]  Department: [place] [address]  Room: [address] [place] [address]  Today's Date: 2/21/2025     Discipline: Physical Therapy          Missed Visit Reason: Missed Visit Reason: Other (Comment)    Missed Time: Attempt    Comment:  PT treatment attempted; pt and family politely decline, awaiting dc to SNF.

## 2025-02-21 NOTE — PROGRESS NOTES
Patient to discharge today going to [facility] being transported by [facility] patient medical team facility  nurse all notified

## 2025-02-21 NOTE — PROGRESS NOTES
Spoke with patient daughter provided her with snf [facility]

## 2025-02-21 NOTE — NURSING NOTE
Discharge instructions reviewed with patient and family.  Report called to Rae, Supervisor at [facility] at [phone].  Patient taken off of telemetry and (2) IV's removed with catheter tips intact.  Patient will be picked up by [facility] for transport to SNF.  Living with Heart Failure and COPD Booklets reviewed and given to patient and family for education.

## 2025-02-22 NOTE — DISCHARGE SUMMARY
Discharge Diagnosis  Mediastinal hematoma, initial encounter    Issues Requiring Follow-Up  - Hepatic function panel in 1 week - resume zetia and statin if LFTs normalize  - Monitor BP off home olmesartan and felodipine, resume as needed for HTN  - Will need interval CT vs PET-CT as outpatient for RUL spiculated pulmonary nodule seen on outpatient CT done 1/16/2025  - Encourage weaning of O2 as able at SNF    Discharge Meds     Medication List      START taking these medications     guaiFENesin 600 mg 12 hr tablet; Commonly known as: Mucinex; Take 1   tablet (600 mg) by mouth 2 times a day. Do not crush, chew, or split.   lidocaine 4 % patch; Place 1 patch over 12 hours on the skin once daily   as needed for mild pain (1 - 3). Remove & discard patch within 12 hours or   as directed by MD.   metoprolol succinate XL 25 mg 24 hr tablet; Commonly known as:   Toprol-XL; Take 0.5 tablets (12.5 mg) by mouth once daily. Do not crush or   chew.   * nicotine 14 mg/24 hr patch; Commonly known as: Nicoderm CQ; Place 1   patch over 24 hours on the skin once daily for 36 doses.   * nicotine 7 mg/24 hr patch; Commonly known as: Nicoderm CQ; Place 1   patch over 24 hours on the skin once daily for 14 doses. Do not fill   before March 29, 2025.; Start taking on: March 29, 2025   nicotine polacrilex 2 mg gum; Commonly known as: Nicorette; Chew 1 each   (2 mg) every 2 hours if needed for smoking cessation (nicotine craving,   urge to smoke).   oxygen gas therapy; Commonly known as: O2; Inhale 1 each once every 24   hours.   polyethylene glycol 17 gram packet; Commonly known as: Glycolax,   Miralax; Take 17 g by mouth once daily.   sennosides-docusate sodium 8.6-50 mg tablet; Commonly known as:   Izabella-Colace; Take 1 tablet by mouth once daily at bedtime.   sodium chloride 0.65 % nasal spray; Commonly known as: Ocean; Administer   1 spray into each nostril 4 times a day as needed for congestion.   tiotropium-olodateroL 2.5-2.5  mcg/actuation mist inhaler; Commonly known   as: Stiolto Respimat; Inhale 2 Inhalations once daily.  * This list has 2 medication(s) that are the same as other medications   prescribed for you. Read the directions carefully, and ask your doctor or   other care provider to review them with you.     CONTINUE taking these medications     aspirin 81 mg EC tablet   clopidogrel 75 mg tablet; Commonly known as: Plavix   ezetimibe 10 mg tablet; Commonly known as: Zetia; Take 1 tablet (10 mg)   by mouth once daily.   fluticasone 50 mcg/actuation nasal spray; Commonly known as: Flonase   multivitamin with minerals tablet   rosuvastatin 40 mg tablet; Commonly known as: Crestor; Take 1 tablet (40   mg) by mouth once daily.     STOP taking these medications     felodipine ER 10 mg 24 hr tablet; Commonly known as: Plendil   olmesartan 20 mg tablet; Commonly known as: BENIcar       Test Results Pending At Discharge  Pending Labs       No current pending labs.            Hospital Course  79 y.o. man with PMHx TAA (3.6 cm), AAA (5.4 cm), severe MVD (identified on 12/2024 heart cath as part of pre-op work-up for AAA repair), moderate aortic stenosis and regurgitation, newly diagnosed severe COPD who had complex PCI at [facility] for PCI of LAD, LCx, and RCA given heavily calcified ascending aorta precluding clamping for CABG and high risk comorbidities precluding CABG. Original plan was to perform PCI, then open AAA repair after 6 months of DAPT, then possible TAVR. During high risk PCI on 2/11, there was retrogade LM dissection following Lcx PCI deployment. Patient had CP and hypotension to SBP 50s. Cath team deployed second stent to LM to cover the lesions. He was resuscitated with 2L LR, 1 unit pRBC, 1 unit platelets, and 1 unit cryo. He was also started on levophed. He was then transferred to CICU where TTE showed no signs of tamponade and CTA showed mediastinal and pericardial hemorrhage. He was started on DAPT for LCx stent. He has  been stable off levophed since 2/12. Repeat CTA 2/12 showed interval improvement in mediastinal and pericardial hemorrhage. Post-PCI he had elevated LFTs and leukocytosis, which have been downtrending since, suspect shock liver and reactive leukocytosis. Metoprolol succinate 12.5 mg daily was started on 2/14. He continued to require 2L NC on 2/14 so CXR was obtained showing R basilar edema/effusions c/w hypervolemia, and he was diuresed with lasix 40 mg x2 with 2.6 L of urine output. DVT ultrasound negative for DVT in bilateral legs. Diuresis was continued through 2/17 when patient appeared euvolemic and had softer BP. Repeat TTE 2/15 stable compared to 2/12 with small anterior pericardial effusion. Patient continued to require 2L O2 on 2/17 to maintain SpO2 > 92%. Repeat CXR c/f possible RLL pneumonia iso increasing leukocytosis and increased productive cough, so started CTX (2/17 - 2/21) and azithromycin (2/17 - 2/19). Repeat TTE 2/18 was stable with small to moderate pericardial effusion and no signs of tamponade, EF 68%, severe AV and MV calcifications, moderate AR. Re-evaluated by PT on 2/19 with desat to 86% while ambulating, required 3L NC with exertion, recommended moderate intensity. He was discharged to SNF in stable condition with 2L NC (new O2 requirement) on 2/21/2025. He was started on stiolto for new COPD diagnosis. Pulmonology follow-up for COPD and RUL pulmonary nodule was requested. He has cardiology follow-up on 2/28. Follow-up with vascular surgery and structural heart were requested given original plan to pursue AAA repair and TAVR after complex PCI.    Outpatient Follow-Up  Future Appointments   Date Time Provider Department Center   2/28/2025 11:00 AM Carl B Gillombardo, MD [id number]         Annalee Tucker MD  PGY-1

## 2025-02-24 LAB
ATRIAL RATE: 110 BPM
P AXIS: 84 DEGREES
P OFFSET: 207 MS
P ONSET: 165 MS
PR INTERVAL: 118 MS
Q ONSET: 224 MS
QRS COUNT: 18 BEATS
QRS DURATION: 68 MS
QT INTERVAL: 328 MS
QTC CALCULATION(BAZETT): 443 MS
QTC FREDERICIA: 401 MS
R AXIS: 84 DEGREES
T AXIS: 44 DEGREES
T OFFSET: 388 MS
VENTRICULAR RATE: 110 BPM

## 2025-02-25 NOTE — DOCUMENTATION CLARIFICATION NOTE
"    PATIENT:               DAKIN, MICKEY  ACCT #:                  [id number]  MRN:                       [id number]  :                       1945  ADMIT DATE:       2025 2:58 PM  DISCH DATE:        2025 5:05 PM  RESPONDING PROVIDER #:        [id number]          PROVIDER RESPONSE TEXT:    I agree with dietician diagnosis of moderate malnutrition on 25.    CDI QUERY TEXT:    Clarification    Instruction:    Based on your assessment of the patient and the clinical information, please provide the requested documentation by clicking on the appropriate radio button and enter any additional information if prompted.    Question: Please further clarify this patient nutritional status as    When answering this query, please exercise your independent professional judgment. The fact that a question is being asked, does not imply that any particular answer is desired or expected.    The patient's clinical indicators include:  Clinical Information: Pt is a 79 y.o. male presenting with mediastinal hematoma.    Clinical Indicators: Nutrition Consult  \"?Malnutrition Diagnosis  Patient has Malnutrition Diagnosis: Yes  Diagnosis Status: New  Malnutrition Diagnosis: Moderate malnutrition related to chronic disease or condition  Related to: inadequate energy intake  As Evidenced by: Pt visually appears moderate- severe fat and muscle loss + reported poor intake (suspect greater than or equal to 75% for greater than 1 month)?    Treatment: nutrition consult with prescription for diet recs    Risk Factors: chronic pain and poor appetite  Options provided:  -- I agree with dietician diagnosis of moderate malnutrition on 25.  -- Other - I will add my own diagnosis  -- Refer to Clinical Documentation Reviewer    Query created by: Aliyah Watson on 2025 9:54 AM      Electronically signed by:  ORACIO DAVIS MD 2025 10:00 AM          "

## 2025-02-26 ENCOUNTER — TELEPHONE (OUTPATIENT)
Dept: CARDIOLOGY | Facility: HOSPITAL | Age: 80
End: 2025-02-26
Payer: MEDICARE

## 2025-02-27 LAB
ATRIAL RATE: 101 BPM
P AXIS: 87 DEGREES
P OFFSET: 210 MS
P ONSET: 168 MS
PR INTERVAL: 126 MS
Q ONSET: 231 MS
QRS COUNT: 16 BEATS
QRS DURATION: 70 MS
QT INTERVAL: 338 MS
QTC CALCULATION(BAZETT): 438 MS
QTC FREDERICIA: 402 MS
R AXIS: 74 DEGREES
T AXIS: 60 DEGREES
T OFFSET: 400 MS
VENTRICULAR RATE: 101 BPM

## 2025-02-28 ENCOUNTER — OFFICE VISIT (OUTPATIENT)
Dept: CARDIOLOGY | Facility: HOSPITAL | Age: 80
End: 2025-02-28
Payer: MEDICARE

## 2025-02-28 VITALS
HEIGHT: 66 IN | HEART RATE: 73 BPM | OXYGEN SATURATION: 94 % | BODY MASS INDEX: 21.21 KG/M2 | WEIGHT: 132 LBS | DIASTOLIC BLOOD PRESSURE: 67 MMHG | SYSTOLIC BLOOD PRESSURE: 157 MMHG

## 2025-02-28 DIAGNOSIS — Z95.5 HISTORY OF CORONARY ARTERY STENT PLACEMENT: ICD-10-CM

## 2025-02-28 DIAGNOSIS — J44.9 CHRONIC OBSTRUCTIVE PULMONARY DISEASE, UNSPECIFIED COPD TYPE (MULTI): ICD-10-CM

## 2025-02-28 DIAGNOSIS — I25.10 CORONARY ARTERY DISEASE INVOLVING NATIVE CORONARY ARTERY OF NATIVE HEART WITHOUT ANGINA PECTORIS: ICD-10-CM

## 2025-02-28 DIAGNOSIS — I35.0 MODERATE AORTIC STENOSIS: Primary | ICD-10-CM

## 2025-02-28 DIAGNOSIS — I25.10 CORONARY ARTERY DISEASE INVOLVING NATIVE HEART, UNSPECIFIED VESSEL OR LESION TYPE, UNSPECIFIED WHETHER ANGINA PRESENT: ICD-10-CM

## 2025-02-28 LAB
ATRIAL RATE: 73 BPM
P AXIS: 87 DEGREES
P OFFSET: 214 MS
P ONSET: 167 MS
PR INTERVAL: 114 MS
Q ONSET: 224 MS
QRS COUNT: 12 BEATS
QRS DURATION: 74 MS
QT INTERVAL: 370 MS
QTC CALCULATION(BAZETT): 407 MS
QTC FREDERICIA: 395 MS
R AXIS: 50 DEGREES
T AXIS: -3 DEGREES
T OFFSET: 409 MS
VENTRICULAR RATE: 73 BPM

## 2025-02-28 PROCEDURE — 99214 OFFICE O/P EST MOD 30 MIN: CPT | Performed by: INTERNAL MEDICINE

## 2025-02-28 PROCEDURE — 93005 ELECTROCARDIOGRAM TRACING: CPT | Performed by: INTERNAL MEDICINE

## 2025-02-28 PROCEDURE — 1157F ADVNC CARE PLAN IN RCRD: CPT | Performed by: INTERNAL MEDICINE

## 2025-02-28 PROCEDURE — 1159F MED LIST DOCD IN RCRD: CPT | Performed by: INTERNAL MEDICINE

## 2025-02-28 PROCEDURE — 1111F DSCHRG MED/CURRENT MED MERGE: CPT | Performed by: INTERNAL MEDICINE

## 2025-02-28 RX ORDER — METOPROLOL SUCCINATE 25 MG/1
25 TABLET, EXTENDED RELEASE ORAL DAILY
Qty: 90 TABLET | Refills: 3 | Status: SHIPPED | OUTPATIENT
Start: 2025-02-28 | End: 2025-03-27

## 2025-02-28 RX ORDER — ASPIRIN 81 MG/1
81 TABLET ORAL DAILY
Qty: 90 TABLET | Refills: 3 | Status: SHIPPED | OUTPATIENT
Start: 2025-02-28 | End: 2025-03-27

## 2025-02-28 RX ORDER — ROSUVASTATIN CALCIUM 40 MG/1
40 TABLET, COATED ORAL DAILY
Qty: 90 TABLET | Refills: 3 | Status: SHIPPED | OUTPATIENT
Start: 2025-02-28 | End: 2025-03-27

## 2025-02-28 RX ORDER — CLOPIDOGREL BISULFATE 75 MG/1
75 TABLET ORAL
Qty: 90 TABLET | Refills: 3 | Status: SHIPPED | OUTPATIENT
Start: 2025-02-28 | End: 2025-03-27

## 2025-02-28 RX ORDER — EZETIMIBE 10 MG/1
10 TABLET ORAL DAILY
Qty: 90 TABLET | Refills: 3 | Status: SHIPPED | OUTPATIENT
Start: 2025-02-28 | End: 2025-03-27

## 2025-02-28 ASSESSMENT — ENCOUNTER SYMPTOMS
DEPRESSION: 0
OCCASIONAL FEELINGS OF UNSTEADINESS: 0
LOSS OF SENSATION IN FEET: 0

## 2025-03-16 PROBLEM — J96.92 HYPERCAPNIC RESPIRATORY FAILURE: Status: ACTIVE | Noted: 2025-01-01

## 2025-03-16 NOTE — H&P
Department of Internal Medicine  History and Physical    PCP: Mauri Chavez DO  Admitting Physician: Dr. Alvarez  Consultants:   Date of Service: 3/16/2025    CHIEF COMPLAINT:  sob    HISTORY OF PRESENT ILLNESS:    Patient is 79-year-old male who presented to the ED for shortness of breath.  On the way to the hospital patient became confused and altered he was subsequently intubated.  In the ED his initial blood gases showed respiratory acidosis.  However repeat ABG showed improvement in blood gases and hypercapnia.   critical care was consulted.  Care was discussed with family and they wanted patient extubated and made DNR CC.     PAST MEDICAL Hx:  No past medical history on file.    PAST SURGICAL Hx:   No past surgical history on file.    FAMILY Hx:  No family history on file.    HOME MEDICATIONS:  Prior to Admission medications    Medication Sig Start Date End Date Taking? Authorizing Provider   clopidogrel (PLAVIX) 75 MG tablet TAKE 1 TABLET BY MOUTH ONCE DAILY 3/6/22   Jay Murguia MD   ezetimibe-simvastatin (VYTORIN) 10-20 MG per tablet TAKE 1 TABLET BY MOUTH ONCE DAILY 3/17/22   Jay Murguia MD   felodipine (PLENDIL) 10 MG extended release tablet TAKE 1 TABLET BY MOUTH ONCE DAILY 3/6/22   Jay Murguia MD   olmesartan (BENICAR) 20 MG tablet TAKE 1 TABLET BY MOUTH ONCE DAILY 3/8/22   Jay Murguia MD       ALLERGIES:  Patient has no known allergies.    SOCIAL Hx:  Social History     Socioeconomic History    Marital status:      Spouse name: Not on file    Number of children: Not on file    Years of education: Not on file    Highest education level: Not on file   Occupational History    Not on file   Tobacco Use    Smoking status: Not on file    Smokeless tobacco: Not on file   Substance and Sexual Activity    Alcohol use: Not on file    Drug use: Not on file    Sexual activity: Not on file   Other Topics Concern    Not on file   Social History Narrative    Not on

## 2025-03-16 NOTE — CONSULTS
Assessment and Plan  Patient is a 79 y.o. male with the following medical Problems:   Acute on chronic hypoxic and hypercapnic respiratory failure requiring intubation and mechanical ventilation.  COPD with acute exacerbation  Metabolic encephalopathy.  Severe multi vascular coronary artery disease ( S/P complex PCI at [facility] for PCI of LAD, LCx, and RCA given heavily calcified ascending aorta precluding clamping for CABG and high risk comorbidities precluding CABG February 2025)  Thoracic aortic aneurysm (3.6 cm)  AAA (5.4 cm)  Acute extra-axial hemorrhage involving the lateral right temporal region    Plan of care:  Patient was supposed to be admitted to the ICU and should be started on Solu-Medrol, DuoNeb, Pulmicort and azithromycin..  Neurosurgical team was supposed to be contacted to evaluate for acute extra-axial intracerebral lateral right temporal region hematoma with a blood obtain MRI however was of care were discussed with the family and it was notified around 6:50 PM.  Patient will be made DNR CC and will be compassionately extubated.      History of Present Illness:   History is limited due to patient's medical condition.  History is mostly obtained from the chart.  Patient is a 79-year-old male above-mentioned medical problems who was brought to the emergency room with progressive shortness of breath.  Patient became altered on the way to the hospital and was intubated.  Initial blood gases showed a pCO2 more than 70 with a pH of 7.00.  Blood gases improved with mechanical ventilation.       Past Medical History:  No past medical history on file.   No past surgical history on file.    Family History:   No family history on file.     Allergies:         Patient has no known allergies.    Social history:  Social History     Socioeconomic History    Marital status:      Spouse name: Not on file    Number of children: Not on file    Years of education: Not on file    Highest education level: Not on

## 2025-03-16 NOTE — ED PROVIDER NOTES
[facility] MED SURG  EMERGENCY DEPARTMENT ENCOUNTER        Pt Name: Mickey L Dakin  MRN: [id number]  Birthdate 1945  Date of evaluation: 3/16/2025  Provider: Yunior Back MD  PCP: Mauri Chavez DO  Note Started: 12:30 PM EDT 3/16/25    CHIEF COMPLAINT       Chief Complaint   Patient presents with    Shortness of Breath     Pt comes via EMS. C/o shortness of breath, had stents placed 2-3 weeks ago.       HISTORY OF PRESENT ILLNESS: 1 or more Elements   History From: Patient    Limitations to history : Altered Mental Status  Social Determinants : None    Mickey L Dakin is a 79 y.o. male who presents to the emergency with complaints of shortness of breath.  Patient brought to the ER by EMS.  Per EMS report patient was alert and oriented to person place and time.  Patient had an initial GCS of 15.  Patient was being transported to the emergency department patient started deteriorating and not answering  questions.  Patient had a GCS of 9 on arrival.  History was very limited as patient was unresponsive.  About 2 to 3 weeks ago patient had stents placed.  Patient had procedure done patient started complaining sternal chest pain, and was hypotensive.  During that hospital admission there were concerns for a dissection after PCI procedure.  CT angiogram performed at [facility] showed a metastatic hemorrhoids or intramural hematoma descending thoracic aorta.      Nursing Notes were all reviewed and agreed with or any disagreements were addressed in the HPI.    ROS:   Pertinent positives and negatives are stated within HPI, all other systems reviewed and are negative.      --------------------------------------------- PAST HISTORY ---------------------------------------------  Past Medical History:  has no past medical history on file.    Past Surgical History:  has no past surgical history on file.    Social History:      Family History: family history is not on file.     The  when measured in a similar fashion. No evidence of extravasation   of contrast material.   5. 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   6. Moderate stool within the right and transverse colon suggestive of   constipation.   7. Moderate diffuse subcutaneous edema dependently compatible with 3rd   spacing of fluid.      RECOMMENDATIONS:   CTA or MRA follow-up in 6 months as appropriate.  Referral to vascular   specialist recommended.         CT HEAD WO CONTRAST   Final Result   1. Findings compatible with acute extra-axial hemorrhage involving the   lateral right temporal region.  A degree of cortically based/parenchymal   hemorrhage is a possibility in this region. No significant mass effect or   midline shift at this time.  Question a history of trauma.  If the patient   does not have a history of trauma further assessment with CT angiogram or MRI   advised to evaluate for a cause of underlying hemorrhage.  Otherwise,   follow-up imaging via head CT advised.   2. Encephalomalacia involving the inferior and lateral aspect of the right   temporal lobe may be related to previous infarct.   3. Diffuse cerebral volume loss with evidence of small vessel disease.   Findings discussed via phone with Dr. Ochoa at 3:49 p.m. EST 03/16/2025.         CTA CHEST W CONTRAST   Final Result   1. No evidence of thoracic aortic aneurysm or dissection.   2. Moderate to large bilateral pleural effusions with passive atelectasis of   portions of the upper and lower lobes.   3. Small focus of consolidation in the medial right middle lobe.  Consider   pneumonia.   4. Infrarenal abdominal aortic aneurysm with mural thrombus, maximum diameter   on axial imaging is 5.6 x 5.4 cm compared with 5.1 x 5.0 cm on the study of   08/09/2024 when measured in a similar fashion. No evidence of extravasation   of contrast material.   5. 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

## 2025-03-17 NOTE — PROGRESS NOTES
Wife requesting that patient not be turned at this time. Stated he became very agitated the last time we was turned.

## 2025-03-17 NOTE — PROGRESS NOTES
Spoke with attending in regards to patients case. Discontinue palliative consult and consult hospice. Patient is a DNRCC and comfort measures.

## 2025-03-17 NOTE — PROGRESS NOTES
Spiritual Health History and Assessment/Progress Note  [facility]    (P) Spiritual/Emotional Needs,  ,  ,      Name: Mickey L Dakin MRN: [id number]    Age: 79 y.o.     Sex: male   Language: English   Yarsani: Nondenominational   Hypercapnic respiratory failure (HCC)     Date: 3/17/2025                           Spiritual Assessment began in [facility] MED SURG        Referral/Consult From: (P) Hospice   Encounter Overview/Reason: (P) Spiritual/Emotional Needs  Service Provided For: (P) Family    Maggie, Belief, Meaning:   Patient identifies as spiritual Family at bedside.  Family/Friends identify as spiritual and have beliefs or practices that help with coping during difficult times      Importance and Influence:  Patient has spiritual/personal beliefs that influence decisions regarding their health  Family/Friends have spiritual/personal beliefs that influence decisions regarding the patient's health    Community:  Patient feels well-supported. Support system includes: Spouse/Partner and Children  Family/Friends feel well-supported. Support system includes: Children    Assessment and Plan of Care:     Patient Interventions include: Other: Conversation with family. Prayer support provided.  Family/Friends Interventions include: Facilitated expression of thoughts and feelings, Explored spiritual coping/struggle/distress, and Affirmed coping skills/support systems    Patient Plan of Care: Spiritual Care available upon further referral  Family/Friends Plan of Care: Spiritual Care available upon further referral    Electronically signed by LYNDSEY Castaneda on 3/17/2025 at 2:14 PM

## 2025-03-17 NOTE — CARE COORDINATION
CM note: Hospice consult noted.  Met with patients family at the bedside.  Daughter was already on the phone with [facility] liaison.  They would like to proceed with [facility] and an official referral was made.  [facility] to send out a nurse to evaluate the patient per the family's requests as family is not sure if they want to care for patient at home or in a facility.  Will await their evaluation/recommendations.  CM checking with [facility] to see if bed available in case family chooses facility as this was family's choice facility.

## 2025-03-17 NOTE — PROGRESS NOTES
Department of Internal Medicine  PN    PCP: Mauri Chavez DO  Admitting Physician: Dr. Alvarez  Consultants:   Date of Service: 3/16/2025    CHIEF COMPLAINT:  sob    HISTORY OF PRESENT ILLNESS:    Patient is 79-year-old male who presented to the ED for shortness of breath.  On the way to the hospital patient became confused and altered he was subsequently intubated.  In the ED his initial blood gases showed respiratory acidosis.  However repeat ABG showed improvement in blood gases and hypercapnia.   critical care was consulted.  Care was discussed with family and they wanted patient extubated and made DNR CC.     3/17/2025  Patient seen examined on medical surgical floor.  Case discussed with patient nurse.  Multiple family members are at the bedside.  Patient is very comfortable at this time.  Patient has easy respirations with minimal movement.  Family again wishes no blood work or vital signs to be performed.  Patient at this time seems more comfortable than early a.m. her last night according to the family.  Will change morphine sulfate 2-4 mg IV push every hour as needed and Ativan 1 mg IV push every 2 hours as needed.    PAST MEDICAL Hx:  No past medical history on file.    PAST SURGICAL Hx:   No past surgical history on file.    FAMILY Hx:  No family history on file.    HOME MEDICATIONS:  Prior to Admission medications    Medication Sig Start Date End Date Taking? Authorizing Provider   clopidogrel (PLAVIX) 75 MG tablet TAKE 1 TABLET BY MOUTH ONCE DAILY 3/6/22   Jay Murguia MD   ezetimibe-simvastatin (VYTORIN) 10-20 MG per tablet TAKE 1 TABLET BY MOUTH ONCE DAILY 3/17/22   Jay Murguia MD   felodipine (PLENDIL) 10 MG extended release tablet TAKE 1 TABLET BY MOUTH ONCE DAILY 3/6/22   Jay Murguia MD   olmesartan (BENICAR) 20 MG tablet TAKE 1 TABLET BY MOUTH ONCE DAILY 3/8/22   Jay Murguia MD       ALLERGIES:  Patient has no known allergies.    SOCIAL Hx:  Social

## 2025-03-18 NOTE — PROGRESS NOTES
Pt noted to be unresponsive with non-reactive pupils. Patient is absent of a pulse, blood pressure and respirations at 0105. Absence of vital signs verified by second RN, TIFFANY Meraz. Wife at bedside. Dr. Alvarez notified.

## 2025-03-18 NOTE — DISCHARGE SUMMARY
Gatherings with Friends and Family: Not on file     Attends Baptism Services: Not on file     Active Member of Clubs or Organizations: Not on file     Attends Club or Organization Meetings: Not on file     Marital Status:    Intimate Partner Violence: Not At Risk (2/12/2025)    Received from [facility]    Humiliation, Afraid, Rape, and Kick questionnaire     Fear of Current or Ex-Partner: No     Emotionally Abused: No     Physically Abused: No     Sexually Abused: No   Housing Stability: Low Risk  (2/16/2025)    Received from [facility]    Housing Stability Vital Sign     Unable to Pay for Housing in the Last Year: No     Number of Times Moved in the Last Year: 1     Homeless in the Last Year: No       ASSESSMENT/PLAN:  Acute on chronic hypoxic and hypercapnic respiratory failure requiring intubation and mechanical ventilation.  COPD with acute exacerbation  Metabolic encephalopathy.  Acute extra-axial hemorrhage involving the lateral right temporal region  Severe multi vascular coronary artery disease ( S/P complex PCI at [facility] for PCI of LAD, LCx, and RCA given heavily calcified ascending aorta precluding clamping for CABG and high risk comorbidities precluding CABG February 2025)  Thoracic aortic aneurysm (3.6 cm)  AAA (5.4 cm)  History of CVA  Moderate aortic stenosis  Hypertension      Patient passed away at 1:05 AM    Suhas Alvarez DO  6:33 AM  3/18/2025

## 2025-03-21 ENCOUNTER — TELEPHONE (OUTPATIENT)
Dept: CARDIOLOGY | Facility: CLINIC | Age: 80
End: 2025-03-21
Payer: MEDICARE

## 2025-03-21 LAB
MICROORGANISM SPEC CULT: NORMAL
MICROORGANISM SPEC CULT: NORMAL
SERVICE CMNT-IMP: NORMAL
SERVICE CMNT-IMP: NORMAL
SPECIMEN DESCRIPTION: NORMAL
SPECIMEN DESCRIPTION: NORMAL

## 2025-03-30 NOTE — PROGRESS NOTES
Referred by Dr. Gillombardo for No chief complaint on file.     History Of Present Illness:    Mickey Dakin is a 79 y.o. male with complex PMH (detailed below) presenting to outpatient cardiology clinic for follow up after a difficult hospitalization.     I performed diagnostic LHC on this patient several weeks ago, which found heavily calcified multivessel. Given the complexity and extent of his coronary his coronary disease as well as his known heavily calcified moderate-to-severe I recommended that he undergo CABG and referred him to one of my surgical colleagues for evaluation.     Unfortunately he was felt to be prohibitive risk for surgery, in part due to his very poor lung function (FEV1 ~20-%) and he was then subsequently referred to our CHIP conference to discuss the possibility of percutaneous coronary revascularization and KENNETH to address his valve. The timing of these procedures was discussed with vascular surgery up front, as the patient also has known severe abdominal aortic aneurysm which would need to be addressed down the line.    After having a very candid conversation with the patient/his wife / daughter the decision was made to pursue FFR/RFR adjudication of intermediate prox LAD lesion as well as PCI of ostioproximal LCX lesion.     The LAD was found to be RFR negative (0.92), so it was left untreated. We then performend OCT guided PCI to ostioprox LCX, which was complicated by LM dissection with extension into the aortic root - necessitating placement of a second MARIBELL in the LM, which thankfully stabilized the patient. He was transferred to [facility] CICU for close monitoring and serial TTE an CTA Aorta showed showed stable / improved findings. This hospitalization was unfortunately complicated by PNA for which supplemental O2 was difficult to wean, not entirely surprising given his very poor lung function at baseline.     After several weeks in the hospital, the patient was subsequently discharged  to SNF while he continues to regain his strength and wean from O2.      He reports to clinic with his wife and daughter reporting slow but steady improvement in his walking distance and overall performance status. They do, collectively, express a new recognition of his overall frailty particularly in regards to the severity of his COPD. They understand better why the surgeons were so hesitant to intubate him and take him to the operating room.    At this time, the patient is hesitant to categorically rule out all future invasive procedures, he recognizes that fixing his abdominal aorta and aortic valve will not fix his lungs - and which he feels is the main  of his symptoms.  For this reason, prior to their visit with me, the patient and his family have elected to change his code status to DNR/DNI, and I feel this is reasonable given his multiple severe comorbidities.       Review of Systems   Constitutional:  No Weight Change, No Fever, No Chills, No Night Sweats, No Fatigue, No Malaise   ENT/Mouth:  No Hearing Changes, No Ear Pain, No Nasal Congestion, No  Sinus Pain, No Hoarseness, No sore throat, No Rhinorrhea, No Swallowing  Difficulty   Eyes:  No Eye Pain, No Swelling, No Redness, No Foreign Body, No Discharge, No Vision Changes   Cardiovascular:  See HPI   Respiratory:  No Cough, No Sputum, No Wheezing, No Smoke Exposure, No Dyspnea   Gastrointestinal:  No Nausea, No Vomiting, No Diarrhea, No  Constipation, No Pain, No Heartburn, No Anorexia, No Dysphagia, No  Hematochezia, No Melena, No Flatulence, No Jaundice   Genitourinary:  No Dysmenorrhea, No DUB, No Dyspareunia, No Dysuria, No  Urinary Frequency, No Hematuria, No Urinary Incontinence, No Urgency,  No Flank Pain, No Urinary Flow Changes   Musculoskeletal:  No Arthralgias, No Myalgias, No Joint Swelling, No  Joint Stiffness, No Back Pain, No Neck Pain, No Injury History   Skin:  No Skin Lesions, No Pruritis, No Hair Changes, No Breast/Skin Changes,  "No Nipple Discharge   Neuro:  No Weakness, No Numbness, No Paresthesias, No Loss of  Consciousness, No Syncope, No Dizziness, No Headache, No Coordination  Changes, No Recent Falls   Psych:  No Anxiety/Panic, No Depression, No Insomnia, No Delusions, No Rumination, No SI/HI/AH/VH, No Social Issues,  No Memory Changes, No Violence/Abuse Hx., No Eating Concerns   Heme/Lymph:  No Bruising, No Bleeding, No Transfusions History, No Lymphadenopathy   Endocrine:  No Polyuria, No Polydipsia, No Temperature Intolerance        Past Medical History:  He has a past medical history of Abdominal aortic aneurysm (AAA), CVA (cerebral vascular accident) (Multi) (1994), and Hypertension.    Past Surgical History:  He has a past surgical history that includes Cardiac catheterization (N/A, 12/5/2024) and Cardiac catheterization (N/A, 2/11/2025).      Social History:  He reports that he has been smoking cigarettes. He started smoking about 60 years ago. He has a 30.1 pack-year smoking history. He has never used smokeless tobacco. He reports that he does not drink alcohol and does not use drugs.    Family History:  Family History   Problem Relation Name Age of Onset    Aortic aneurysm Neg Hx          Allergies:  Patient has no known allergies.    Outpatient Medications:  Current Outpatient Medications   Medication Instructions    fluticasone (Flonase) 50 mcg/actuation nasal spray 1 spray, Daily PRN    multivitamin with minerals tablet 1 tablet, Daily        Last Recorded Vitals:  Vitals:    02/28/25 1049   BP: 157/67   BP Location: Left arm   Patient Position: Sitting   Pulse: 73   SpO2: 94%   Weight: 59.9 kg (132 lb)   Height: 1.676 m (5' 6\")       Physical Exam:  Physical exam  GEN: frail appearing, calm, cooperative, asking appropriate questions.  NEURO: Alert and oriented x3, CN2-12 intact, SILT, Moving all extremities without difficulty  HEENT: atraumatic, no goiter, no JVD, normal uvula   CARDS: PMI is non-displaced, RRR, no " "MRG  PULM: poor air movement throughout all lung fields  ABD: soft, non-distended, non-tender, non-tympanitic, BS in all 4 quadrants. No hepatosplenomegaly  : unremarkable  SKIN: healthy appearing, normal skin turgor   EXT: atraumatic  VASC: b/l radial pulses are brisk and equal            Last Labs:  CBC -  Lab Results   Component Value Date    WBC 7.3 02/21/2025    WBC 9.2 02/03/2025    HGB 12.0 (L) 02/21/2025    HGB 14.8 02/03/2025    HCT 37.1 (L) 02/21/2025    HCT 44.3 02/03/2025     02/21/2025    .5 (H) 02/03/2025     02/21/2025     02/03/2025       CMP -  Lab Results   Component Value Date    CALCIUM 8.5 (L) 02/21/2025    CALCIUM 9.1 02/03/2025    PHOS 3.0 02/21/2025    PROT 5.1 (L) 02/21/2025    ALBUMIN 3.1 (L) 02/21/2025    AST 45 (H) 02/21/2025     (H) 02/21/2025    ALKPHOS 71 02/21/2025    BILITOT 0.5 02/21/2025       LIPID PANEL -   No results found for: \"CHOL\", \"HDL\", \"CHHDL\", \"LDL\", \"VLDL\", \"TRIG\", \"NHDL\"    RENAL FUNCTION PANEL -   Lab Results   Component Value Date    K 4.6 02/21/2025    K 4.9 02/03/2025    PHOS 3.0 02/21/2025       No results found for: \"BNP\", \"HGBA1C\"        Last Cardiology Tests:    ECG:  Encounter Date: 02/28/25   ECG 12 lead (Clinic Performed)   Result Value    Ventricular Rate 73    Atrial Rate 73    DE Interval 114    QRS Duration 74    QT Interval 370    QTC Calculation(Bazett) 407    P Axis 87    R Axis 50    T Axis -3    QRS Count 12    Q Onset 224    P Onset 167    P Offset 214    T Offset 409    QTC Fredericia 395    Narrative    Sinus rhythm with sinus arrhythmia  Possible Anterior infarct , age undetermined  Nonspecific T wave abnormality  Abnormal ECG  When compared with ECG of 11-FEB-2025 15:02,  Premature atrial complexes are now Present  Vent. rate has decreased BY  37 BPM  Inverted T waves have replaced nonspecific T wave abnormality in Inferior leads  Confirmed by Bradley Phillip ([id number]) on 3/11/2025 7:31:02 PM         Echo:  Echo " Results:  Transthoracic Echo (TTE) Limited With Doppler, Color And Contrast 02/18/2025    [facility], [address] [place] [address]  Tel [phone] and Fax [phone]    TRANSTHORACIC ECHOCARDIOGRAM REPORT      Patient Name:       MICKEY DAKIN        Reading Physician:    [id number] Romie Perera MD  Study Date:         2/18/2025           Ordering Provider:    [id number] SAMIRA TSE  MRN/PID:            [id number]            Fellow:  Accession#:         [id number]        Nurse:  Date of Birth/Age:  1945 / 79      Sonographer:          Miguel A clemens                                     RDWANDA  Gender assigned at  M                   Additional Staff:  Birth:  Height:             167.64 cm           Admit Date:  Weight:             58.97 kg            Admission Status:     Inpatient -  Routine  BSA / BMI:          1.67 m2 / 20.98     Encounter#:           [id number]  kg/m2  Blood Pressure:     143/68 mmHg         Department Location:  [place] [address]    Study Type:    TRANSTHORACIC ECHO (TTE) LIMITED  Diagnosis/ICD: Encounter for screening for cardiovascular disorders-Z13.6  Indication:    effusion  CPT Code:      Doppler Limited-39434; Echo Limited-85165; Color Doppler-76557    Patient History:  Pertinent History: Mediastinal hematoma, COPD, CAD, AAA, CP.    Study Detail: The following Echo studies were performed: 2D, M-Mode, Doppler and  color flow. Technically challenging study due to patient lying in  supine position, body habitus and prominent lung artifact.  Definity used as a contrast agent for endocardial border  definition. Total contrast used for this procedure was 2 mL via IV  push.      PHYSICIAN INTERPRETATION:  Left Ventricle: Left ventricular ejection fraction is normal, calculated by Geiger's biplane at 68%. There are no regional left ventricular wall motion abnormalities. The left ventricular cavity size is normal. There is normal septal and  normal posterior left ventricular wall thickness. Left ventricular diastolic filling was not assessed.  Left Atrium: The left atrial size was not assessed.  Right Ventricle: The right ventricle was not assessed. There is normal right ventricular global systolic function.  Right Atrium: The right atrial size was not assessed. The right atrium has a prominent Chiari network.  Aortic Valve: The aortic valve is probably trileaflet. There is moderate to severe aortic valve cusp calcification. There is reduced systolic aortic valve leaflet excursion. There is moderate aortic valve regurgitation.  Mitral Valve: The mitral valve is mildly thickened. There is moderate to severe mitral annular calcification. There is trace mitral valve regurgitation.  Tricuspid Valve: The tricuspid valve is structurally normal. There is mild tricuspid regurgitation. The Doppler estimated RVSP is mildly elevated at 37.1 mmHg.  Pulmonic Valve: The pulmonic valve is structurally normal. There is trace pulmonic valve regurgitation.  Pericardium: Small to moderate pericardial effusion.  Aorta: The aortic root was not assessed.  Systemic Veins: The inferior vena cava appears normal in size, with IVC inspiratory collapse greater than 50%.  In comparison to the previous echocardiogram(s): Compared with study dated 2/15/2025, no significant change.      CONCLUSIONS:  1. Left ventricular ejection fraction is normal, calculated by Geiger's biplane at 68%.  2. There is normal right ventricular global systolic function.  3. Mildly elevated right ventricular systolic pressure.  4. There is moderate to severe aortic valve cusp calcification.  5. Moderate aortic valve regurgitation.  6. There is moderate to severe mitral annular calcification.  7. Small to moderate pericardial effusion.  8. Compared with study dated 2/15/2025, no significant change.    QUANTITATIVE DATA SUMMARY:    2D MEASUREMENTS:          Normal Ranges:  IVSd:            0.70 cm   (0.6-1.1cm)  LVPWd:           0.80 cm  (0.6-1.1cm)  LVIDd:           4.30 cm  (3.9-5.9cm)  LVIDs:           3.20 cm  LV Mass Index:   58 g/m2  LVEDV Index:     43 ml/m2  LV % FS          25.6 %      LV SYSTOLIC FUNCTION:  Normal Ranges:  EF-A4C View:    61 % (>=55%)  EF-A2C View:    76 %  EF-Biplane:     68 %  LV EF Reported: 68 %      RIGHT VENTRICLE:  TAPSE: 22.8 mm  RV s'  0.15 m/s      TRICUSPID VALVE/RVSP:          Normal Ranges:  Peak TR Velocity:     2.92 m/s  Est. RA Pressure:     3 mmHg  RV Syst Pressure:     37 mmHg  (< 30mmHg)  IVC Diam:             2.10 cm      [id number] Romie Perera MD  Electronically signed on 2/18/2025 at 6:36:51 PM        ** Final **      Transthoracic Echo (TTE) Limited With Doppler And Color 02/15/2025    [facility], [address] [place] [address]  Tel [phone] and Fax [phone]    TRANSTHORACIC ECHOCARDIOGRAM REPORT      Patient Name:       MICKEY DAKIN        Reading Physician:    [id number] Lorne Carroll MD  Study Date:         2/15/2025           Ordering Provider:    [id number] ROSIO CHIU  MRN/PID:            [id number]            Fellow:  Accession#:         [id number]        Nurse:  Date of Birth/Age:  1945 / 79      Sonographer:          Stephanie Miller RDCS  years  Gender assigned at  M                   Additional Staff:  Birth:  Height:             167.64 cm           Admit Date:           2/11/2025  Weight:             58.06 kg            Admission Status:     Inpatient -  Routine  BSA / BMI:          1.65 m2 / 20.66     Encounter#:           [id number]  kg/m2  Blood Pressure:     106/66 mmHg         Department Location:  [facility]  Non Invasive    Study Type:    TRANSTHORACIC ECHO (TTE) LIMITED  Diagnosis/ICD: Other pericardial effusion (noninflammatory)-I31.39  Indication:    Mediastinal hemorrhage after PCI, r/o pericardial effusion  CPT Code:      Echo Limited-23229; Color Doppler-81177; Doppler  Limited-06769    Patient History:  Valve Disorders:   Aortic Stenosis.  Pertinent History: CAD, Chest Pain and COPD. Mediastinal hematoma.    Study Detail: The following Echo studies were performed: 2D, M-Mode, Doppler and  color flow. Unable to obtain suprasternal notch view.      PHYSICIAN INTERPRETATION:  Left Ventricle: Left ventricular ejection fraction is normal, by visual estimate at 60-65%. There are no regional left ventricular wall motion abnormalities. The left ventricular cavity size is normal. There is normal septal and normal posterior left ventricular wall thickness. There is left ventricular concentric remodeling. Left ventricular diastolic filling was not assessed.  Left Atrium: The left atrial size was not assessed.  Right Ventricle: The right ventricle is normal in size. There is normal right ventricular global systolic function.  Right Atrium: The right atrial size was not assessed.  Aortic Valve: The aortic valve was not assessed. Aortic valve regurgitation was not assessed.  Mitral Valve: The mitral valve is mildly thickened. There is moderate mitral annular calcification. Mitral valve regurgitation was not assessed.  Tricuspid Valve: The tricuspid valve is structurally normal. There is trace to mild tricuspid regurgitation. The Doppler estimated RVSP is slightly elevated at 32.2 mmHg.  Pulmonic Valve: The pulmonic valve was not assessed. Pulmonic valve regurgitation was not assessed.  Pericardium: Small pericardial effusion anterior to the right ventricle. There is no evidence of cardiac tamponade.  Aorta: The aortic root was not assessed.  Systemic Veins: The inferior vena cava appears normal in size, with IVC inspiratory collapse greater than 50%.  In comparison to the previous echocardiogram(s): Compared with study dated 2/12/2024, no significant change.      CONCLUSIONS:  1. Left ventricular ejection fraction is normal, by visual estimate at 60-65%.  2. There is normal right ventricular  global systolic function.  3. Small anteriorly located pericardial effusino without tamponade.  4. There is no evidence of cardiac tamponade.  5. Valvular funciton was not assessed on this study.    QUANTITATIVE DATA SUMMARY:    2D MEASUREMENTS:          Normal Ranges:  LAs:             3.00 cm  (2.7-4.0cm)  IVSd:            0.90 cm  (0.6-1.1cm)  LVPWd:           0.90 cm  (0.6-1.1cm)  LVIDd:           3.90 cm  (3.9-5.9cm)  LVIDs:           2.80 cm  LV Mass Index:   64 g/m2  LVEDV Index:     43 ml/m2  LV % FS          28.2 %      LEFT ATRIUM:                 Normal Ranges:  LA Vol A4C:        19.1 ml   (22+/-6mL/m2)  LA Vol Index A4C:  11.5ml/m2  LA Area A4C:       9.8 cm2  LA Major Axis A4C: 4.3 cm      RIGHT ATRIUM:         Normal Ranges:  RA Area A4C:  6.8 cm2      LV SYSTOLIC FUNCTION:  Normal Ranges:  EF-A4C View:    59 % (>=55%)  EF-A2C View:    67 %  EF-Biplane:     64 %  EF-Visual:      63 %  LV EF Reported: 63 %      RIGHT VENTRICLE:  RV Basal 2.56 cm  RV Mid   1.21 cm  RV Major 6.5 cm      TRICUSPID VALVE/RVSP:          Normal Ranges:  Peak TR Velocity:     2.70 m/s  RV Syst Pressure:     32 mmHg  (< 30mmHg)  IVC Diam:             2.03 cm      [id number] Lorne Carroll MD  Electronically signed on 2/15/2025 at 12:41:38 PM        ** Final **      Transthoracic Echo (TTE) Limited With Doppler And Color 02/12/2025    [facility], [address] [place] [address]  Tel [phone] and Fax [phone]    TRANSTHORACIC ECHOCARDIOGRAM REPORT      Patient Name:       MICKEY DAKIN Reading Physician:    [id number] Ramila Fisher MD  Study Date:         2/12/2025           Ordering Provider:    [id number] JUAN DIEGO ROBERTSON  MRN/PID:            [id number]            Fellow:  Accession#:         [id number]        Nurse:  Date of Birth/Age:  1945 / 79      Sonographer:          Halima Pavlick  years                                     RDCS  Gender assigned at                      Additional Staff:  Birth:  Height:             167.64 cm           Admit Date:           2/11/2025  Weight:             57.15 kg            Admission Status:     Inpatient -  Routine  BSA / BMI:          1.64 m2 / 20.34     Encounter#:           [id number]  kg/m2  Blood Pressure:     106/57 mmHg         Department Location:  [facility]    Study Type:    TRANSTHORACIC ECHO (TTE) LIMITED  Diagnosis/ICD: Atherosclerotic heart disease of native coronary artery without  angina pectoris-I25.10  Indication:    Evaluate for pericadial effusion  CPT Code:      Echo Limited-62971; Doppler Limited-78981; Color Doppler-24385    Patient History:  Pertinent History: S/p PCI, severe COPD, CVA, AS and AI, AAA, mediastinal  hematoma, severe MVD.    Study Detail: The following Echo studies were performed: 2D, M-Mode, Doppler and  color flow.      PHYSICIAN INTERPRETATION:  Left Ventricle: Left ventricular ejection fraction is normal, by visual estimate at 60-65%. There are no regional left ventricular wall motion abnormalities. The left ventricular cavity size is normal. There is a false tendon visualized in the left ventricle. Left ventricular diastolic filling was not assessed.  Left Atrium: The left atrial size was not assessed.  Right Ventricle: The right ventricle is normal in size. There is normal right ventricular global systolic function.  Right Atrium: The right atrium is normal in size.  Aortic Valve: The aortic valve is trileaflet. There is moderate to severe aortic valve cusp calcification. There is no evidence of aortic valve regurgitation. Trileflet AV with calcified and signiicantly restricted leaflets. 2 D appearance is suggestive of at least moderate AS though not quantified by color or spectral Doppler on today's exam.  Mitral Valve: The mitral valve is normal in structure. There is mild mitral annular calcification. There is no evidence of mitral valve regurgitation.  Tricuspid Valve: The tricuspid valve is  structurally normal. No evidence of tricuspid regurgitation.  Pulmonic Valve: The pulmonic valve is not well visualized. Pulmonic valve regurgitation was not assessed.  Pericardium: Trivial to small pericardial effusion. There is no evidence of cardiac tamponade. There is an anterior clear space.  Aorta: The aortic root is normal.  Systemic Veins: The inferior vena cava appears normal in size, with IVC inspiratory collapse greater than 50%.  In comparison to the previous echocardiogram(s): Compared with the prior exam from 2/11/2025, today's exam is a limited study without assesment of valve function. There is only a trace to small pericardial effusion today without tamponade physiology. There was repoted to be at least moderate AI previously, not assessed today and by 2 D images appears to have at least moderate AS which was not quantified today. The LV systolicc function remains preserved but not as hyperdynamic as yesterday. LV cavity appears to be better filled today, with yesterday's exam with near cavity obliteration.      CONCLUSIONS:  1. Left ventricular ejection fraction is normal, by visual estimate at 60-65%.  2. There is no evidence of cardiac tamponade.  3. There is moderate to severe aortic valve cusp calcification.  4. Trileflet AV with calcified and signiicantly restricted leaflets. 2 D appearance is suggestive of at least moderate AS though not quantified by color or spectral Doppler on today's exam.  5. Compared with the prior exam from 2/11/2025, today's exam is a limited study without assesment of valve function. There is only a trace to small pericardial effusion today without tamponade physiology. There was repoted to be at least moderate AI previously, not assessed today and by 2 D images appears to have at least moderate AS which was not quantified today. The LV systolicc function remains preserved but not as hyperdynamic as yesterday. LV cavity appears to be better filled today, with  yesterday's exam with near cavity obliteration.    QUANTITATIVE DATA SUMMARY:    LV SYSTOLIC FUNCTION:  Normal Ranges:  EF-Visual:      63 %  LV EF Reported: 63 %      TRICUSPID VALVE/RVSP:         Normal Ranges:  IVC Diam:             2.00 cm      [id number] Ramila Fisher MD  Electronically signed on 2/12/2025 at 4:21:21 PM        ** Final **      Transthoracic Echo (TTE) Limited With Doppler And Color 02/11/2025    [facility], [address] [place] [address]  Tel [phone] and Fax [phone]    TRANSTHORACIC ECHOCARDIOGRAM REPORT      Patient Name:       MICKEY DAKIN        Reading Physician:    [id number] José Martinez MD  Study Date:         2/11/2025           Ordering Provider:    [id number] ROBERTSON  MRN/PID:            [id number]            Fellow:  Accession#:         [id number]        Nurse:  Date of Birth/Age:  1945 / 79      Sonographer:          Trevor clemens                                     RDCS, RVT  Gender assigned at  M                   Additional Staff:  Birth:  Height:             167.64 cm           Admit Date:           2/11/2025  Weight:             55.34 kg            Admission Status:     Inpatient - STAT  BSA / BMI:          1.62 m2 / 19.69     Encounter#:           [id number]  kg/m2  Blood Pressure:     149/89 mmHg         Department Location:  [facility]    Study Type:    TRANSTHORACIC ECHO (TTE) LIMITED  Diagnosis/ICD: Other pericardial effusion (noninflammatory)-I31.39  Indication:    pericardial effusion  CPT Code:      Echo Limited-67789; Doppler Limited-93096; Color Doppler-90057    Patient History:  Pertinent History: CAD, pericardial effusion.    Study Detail: The following Echo studies were performed: 2D, Doppler, color flow  and M-Mode.      PHYSICIAN INTERPRETATION:  Left Ventricle: Left ventricular ejection fraction is hyperdynamic, by visual estimate at 70-75%. There are no regional left ventricular wall motion  abnormalities. The left ventricular cavity size is normal. Left ventricular diastolic filling was not assessed.  Left Atrium: The left atrial size is normal.  Right Ventricle: The right ventricle is normal in size. There is normal right ventricular global systolic function.  Right Atrium: The right atrium is normal in size.  Aortic Valve: The aortic valve was not well visualized. There is moderate aortic valve regurgitation. The peak instantaneous gradient of the aortic valve is 27 mmHg. The mean gradient of the aortic valve is 14 mmHg. The aortic regurgitation is incompletely characterized but appears to be at least moderate. There appears to be at least mild aortic stenosis.  Mitral Valve: The mitral valve is normal in structure. There is moderate mitral annular calcification. There is trace mitral valve regurgitation.  Tricuspid Valve: The tricuspid valve is structurally normal. There is trace tricuspid regurgitation. The Doppler estimated RVSP is mildly elevated at 35.7 mmHg.  Pulmonic Valve: The pulmonic valve was not assessed. Pulmonic valve regurgitation was not assessed.  Pericardium: Small pericardial effusion anterior to the right ventricle.  Aorta: The aortic root was not well visualized. The ascending aorta was not well visualized.  Systemic Veins: The inferior vena cava appears normal in size, with IVC inspiratory collapse greater than 50%.  In comparison to the previous echocardiogram(s): Compared with study dated 2/11/2025, the prior study was a limited echo by the on call cardiology fellow. The pericardial effusion appears mildly smaller on the current study.      CONCLUSIONS:  1. Limited study to assess pericardial effusion.  2. Left ventricular ejection fraction is hyperdynamic, by visual estimate at 70-75%.  3. There is normal right ventricular global systolic function.  4. There is moderate mitral annular calcification.  5. Mildly elevated right ventricular systolic pressure.  6. The aortic  regurgitation is incompletely characterized but appears to be at least moderate. There appears to be at least mild aortic stenosis.  7. Small pericardial effusion anterior to the right ventricle.  8. Both ventricles are collapsible and appear underfilled.  9. The inferior vena cava appears normal in size, with IVC inspiratory collapse greater than 50%.    QUANTITATIVE DATA SUMMARY:    2D MEASUREMENTS:          Normal Ranges:  LVEDV Index:     23 ml/m2      LV SYSTOLIC FUNCTION:  Normal Ranges:  EF-A4C View:    83 % (>=55%)  EF-A2C View:    76 %  EF-Biplane:     80 %  EF-Visual:      73 %  LV EF Reported: 73 %      AORTIC VALVE:           Normal Ranges:  AoV Vmax:     2.60 m/s  (<=1.7m/s)  AoV Peak P.0 mmHg (<20mmHg)  AoV Mean P.0 mmHg (1.7-11.5mmHg)  AoV VTI:      36.70 cm  (18-25cm)      TRICUSPID VALVE/RVSP:          Normal Ranges:  Peak TR Velocity:     2.86 m/s  RV Syst Pressure:     36 mmHg  (< 30mmHg)  IVC Diam:             1.80 cm      [id number] José Martinez MD  Electronically signed on 2025 at 4:20:17 PM        ** Final **      Transthoracic Echo (TTE) Limited With Doppler And Color 2025    [facility], [address] [place] [address]  Tel [phone] and Fax [phone]    TRANSTHORACIC ECHOCARDIOGRAM REPORT      Patient Name:        MICKEY DAKIN         Reading Physician: [id number] Peter Burnette MD  Study Date:          2025            Ordering Provider: [id number] JUAN DIEGO ROBERTSON  MRN/PID:             [id number]             Fellow:            [id number] Rudy Patel MD PhD  Accession#:          [id number]         Nurse:  Date of Birth/Age:   1945 / 79 years Sonographer:       Fellow exam  Gender assigned at   M                    Additional Staff:  Birth:  Height:                                   Admit Date:        2025  Weight:                                   Admission Status:  Inpatient - STAT  BSA / BMI:           m2 / kg/m2            Encounter#:        [id number]    Study Type:    TRANSTHORACIC ECHO (TTE) LIMITED  Diagnosis/ICD: Other pericardial effusion (noninflammatory)-I31.39  Indication:    Pericardial Effusion  CPT Code:      Echo Limited-40098; Doppler Limited-12320; Color Doppler-45503  Study Detail: The following Echo studies were performed: 2D, M-Mode, Doppler and  color flow.      PHYSICIAN INTERPRETATION:  Left Ventricle: Left ventricular ejection fraction is hyperdynamic, by visual estimate at 75%. There is concentric left ventricular hypertrophy. There are no regional left ventricular wall motion abnormalities. The left ventricular cavity size is normal. Left ventricular diastolic filling was not assessed.  Left Atrium: The left atrial size is normal.  Right Ventricle: The right ventricle is normal in size. There is normal right ventricular global systolic function.  Right Atrium: The right atrium is normal in size.  Aortic Valve: The aortic valve was not well visualized. There is mild to moderate aortic valve cusp calcification. There is mild aortic valve regurgitation. Suspect at least mild aortic stenosis (not well interrogated).  Mitral Valve: The mitral valve is normal in structure. There is mild mitral annular calcification. There is trace mitral valve regurgitation.  Tricuspid Valve: The tricuspid valve is structurally normal. There is trace tricuspid regurgitation.  Pulmonic Valve: The pulmonic valve is not well visualized. There is trace pulmonic valve regurgitation.  Pericardium: Small to moderate pericardial effusion.  Aorta: The aortic root was not well visualized. There is no dilatation of the aortic root.  Systemic Veins: The inferior vena cava appears normal in size, with IVC inspiratory collapse greater than 50%.      CONCLUSIONS:  1. Limited fellow echo.  2. Left ventricular ejection fraction is hyperdynamic, by visual estimate at 75%.  3. There is normal right ventricular global systolic function.  4. Mild  aortic valve regurgitation.  5. Small to moderate pericardial effusion (predominantly anterior/apical).  6. The inferior vena cava appears normal in size, with IVC inspiratory collapse greater than 50%.    QUANTITATIVE DATA SUMMARY:    AORTA MEASUREMENTS:         Normal Ranges:  Ao Sinus, d:        3.20 cm (2.1-3.5cm)      LV SYSTOLIC FUNCTION:  Normal Ranges:  EF-Visual:      75 %  LV EF Reported: 75 %      TRICUSPID VALVE/RVSP:         Normal Ranges:  IVC Diam:             1.50 cm      [id number] Peter Burnette MD  Electronically signed on 2/12/2025 at 9:36:05 AM        ** Final **      Transthoracic Echo (TTE) Complete 02/11/2025    [facility], [address] [place] [address]  Tel [phone] and Fax [phone]    TRANSTHORACIC ECHOCARDIOGRAM REPORT      Patient Name:       MICKEY DAKIN        Paolo Physician:    [id number] Anoop Mckinley DO  Study Date:         2/11/2025           Ordering Provider:    [id number] RAYMON CONTRERAS  MRN/PID:            [id number]            Fellow:  Accession#:         [id number]        Nurse:  Date of Birth/Age:  1945 / 79      Sonographer:          Saul clemens                                     WANDA  Gender assigned at  M                   Additional Staff:  Birth:  Height:             167.00 cm           Admit Date:           2/11/2025  Weight:             55.00 kg            Admission Status:     Inpatient -  Critical/Stat  (within 1-3  hours)  BSA / BMI:          1.61 m2 / 19.72     Encounter#:           [id number]  kg/m2  Blood Pressure:     119/105 mmHg        Department Location:  [facility]    Study Type:    TRANSTHORACIC ECHO (TTE) COMPLETE  Diagnosis/ICD: Other pericardial effusion (noninflammatory)-I31.39  Indication:    Status post insertion of drug eluting coronary artery stent  CPT Code:      Echo Complete w Full Doppler-67322    Patient History:  Pertinent History: CAD. pericardial effusion.    Study Detail: The following  Echo studies were performed: 2D, M-Mode, Doppler and  color flow. Technically challenging study due to body habitus,  poor acoustic windows and sitting upright.      PHYSICIAN INTERPRETATION:  Left Ventricle: Left ventricular ejection fraction is low normal, calculated by Geiger's biplane at 53%. There are no regional left ventricular wall motion abnormalities. The left ventricular cavity size is normal. There is normal septal and normal posterior left ventricular wall thickness. There is left ventricular concentric remodeling. Spectral Doppler shows a Grade I (impaired relaxation pattern) of left ventricular diastolic filling with normal left atrial filling pressure.  Left Atrium: The left atrial size is normal.  Right Ventricle: The right ventricle is normal in size. There is normal right ventricular global systolic function.  Right Atrium: The right atrium is normal in size.  Aortic Valve: The aortic valve was not well visualized. There is evidence of mild to moderate aortic valve stenosis. The aortic valve dimensionless index is 0.46. The peak aortic velocity was obtained from the apical view. There is mild to moderate aortic valve regurgitation. The peak instantaneous gradient of the aortic valve is 34 mmHg. The mean gradient of the aortic valve is 17 mmHg.  Mitral Valve: The mitral valve is normal in structure. There is mild to moderate mitral annular calcification. There is trace to mild mitral valve regurgitation.  Tricuspid Valve: The tricuspid valve is structurally normal. There is trace tricuspid regurgitation. The Doppler estimated RVSP is within normal limits at 19.5 mmHg.  Pulmonic Valve: The pulmonic valve is not well visualized. The pulmonic valve regurgitation was not well visualized.  Pericardium: Small pericardial effusion localized near the right ventricle. There is no evidence of cardiac tamponade.  Aorta: The aortic root is normal. There is no dilatation of the ascending aorta. There is no  dilatation of the aortic root.  Systemic Veins: The inferior vena cava appears normal in size, with IVC inspiratory collapse greater than 50%.  In comparison to the previous echocardiogram(s): Compared with study dated 2/11/2025,. Compared to limited study obtained earlier today, the effusion has expanded and is more RV localizied. The effusion is at least small but without chamber collapse or inflow variation to suggest tamponade at this time. The IVC is normal at 1.8 cm and collapses > 50%. Patient however is tachycardic.      CONCLUSIONS:  1. Left ventricular ejection fraction is low normal, calculated by Geiger's biplane at 53%.  2. Spectral Doppler shows a Grade I (impaired relaxation pattern) of left ventricular diastolic filling with normal left atrial filling pressure.  3. There is normal right ventricular global systolic function.  4. Right ventricular systolic pressure is within normal limits.  5. Mild to moderate aortic valve stenosis.  6. Mild to moderate aortic valve regurgitation.  7. There is no evidence of cardiac tamponade.  8. Compared to limited study obtained earlier today, the effusion has expanded and is more RV localizied. The effusion is at least small but without chamber collapse or inflow variation to suggest tamponade at this time. The IVC is normal at 1.8 cm and collapses > 50%. Patient however is tachycardic.    QUANTITATIVE DATA SUMMARY:    2D MEASUREMENTS:          Normal Ranges:  IVSd:            0.92 cm  (0.6-1.1cm)  LVPWd:           0.90 cm  (0.6-1.1cm)  LVIDd:           3.61 cm  (3.9-5.9cm)  LVIDs:           2.33 cm  LV Mass Index:   58 g/m2  LVEDV Index:     32 ml/m2  LV % FS          35.2 %      LEFT ATRIUM:                  Normal Ranges:  LA Vol A4C:        30.1 ml    (22+/-6mL/m2)  LA Vol A2C:        33.7 ml  LA Vol BP:         34.6 ml  LA Vol Index A4C:  18.6ml/m2  LA Vol Index A2C:  20.9 ml/m2  LA Vol Index BP:   21.5 ml/m2  LA Area A4C:       13.3 cm2  LA Area A2C:        15.3 cm2  LA Major Axis A4C: 5.0 cm  LA Major Axis A2C: 5.9 cm  LA Volume Index:   21.5 ml/m2  LA Vol A4C:        27.8 ml  LA Vol A2C:        31.3 ml  LA Vol Index BSA:  18.3 ml/m2      RIGHT ATRIUM:         Normal Ranges:  RA Area A4C:  8.6 cm2      AORTA MEASUREMENTS:         Normal Ranges:  Ao Sinus, d:        3.20 cm (2.1-3.5cm)  Ao STJ, d:          2.70 cm (1.7-3.4cm)  Asc Ao, d:          3.00 cm (2.1-3.4cm)      LV SYSTOLIC FUNCTION:  Normal Ranges:  EF-A4C View:    54 % (>=55%)  EF-A2C View:    61 %  EF-Biplane:     53 %  LV EF Reported: 53 %      LV DIASTOLIC FUNCTION:          Normal Ranges:  MV Peak E:             0.51 m/s (0.7-1.2 m/s)  MV Peak A:             1.21 m/s (0.42-0.7 m/s)  E/A Ratio:             0.42     (1.0-2.2)      MITRAL VALVE:         Normal Ranges:  MV DT:        89 msec (150-240msec)      AORTIC VALVE:                      Normal Ranges:  AoV Vmax:                2.90 m/s  (<=1.7m/s)  AoV Peak P.6 mmHg (<20mmHg)  AoV Mean P.5 mmHg (1.7-11.5mmHg)  LVOT Max Silvio:            1.20 m/s  (<=1.1m/s)  AoV VTI:                 39.71 cm  (18-25cm)  LVOT VTI:                18.42 cm  LVOT Diameter:           2.00 cm   (1.8-2.4cm)  AoV Area, VTI:           1.45 cm2  (2.5-5.5cm2)  AoV Area,Vmax:           1.29 cm2  (2.5-4.5cm2)  AoV Dimensionless Index: 0.46      AORTIC INSUFFICIENCY:  AI Vmax:       3.39 m/s  AI Half-time:  536 msec  AI Decel Time: 1847 msec  AI Decel Rate: 183.52 cm/s2      RIGHT VENTRICLE:  RV Basal 2.20 cm  RV Mid   1.20 cm  RV Major 7.0 cm  TAPSE:   15.6 mm      TRICUSPID VALVE/RVSP:          Normal Ranges:  Peak TR Velocity:     2.03 m/s  Est. RA Pressure:     3 mmHg  RV Syst Pressure:     20 mmHg  (< 30mmHg)  IVC Diam:             1.80 cm      PULMONIC VALVE:          Normal Ranges:  PV Accel Time:  133 msec (>120ms)  PV Max Silvio:     1.4 m/s  (0.6-0.9m/s)  PV Max P.4 mmHg      [id number] Anoop Mckinley DO  Electronically signed on  2/11/2025 at 3:17:02 PM        ** Final **      Transthoracic Echo (TTE) Limited With Doppler And Color 02/11/2025    [facility], [address] [place] [address]  Tel [phone] and Fax [phone]    TRANSTHORACIC ECHOCARDIOGRAM REPORT      Patient Name:       MICKEY DAKIN        Reading Physician:    [id number] Romie Perera MD  Study Date:         2/11/2025           Ordering Provider:    [id number] RAYMON CONTRERAS  MRN/PID:            [id number]            Fellow:  Accession#:         [id number]        Nurse:  Date of Birth/Age:  1945 / 79      Sonographer:          Jackie clemens RDCS  Gender assigned at  M                   Additional Staff:  Birth:  Height:                                 Admit Date:           2/11/2025  Weight:                                 Admission Status:     Inpatient -  Routine  BSA / BMI:          m2 / kg/m2          Encounter#:           [id number]  Blood Pressure:     /                   Department Location:  [facility]    Study Type:    TRANSTHORACIC ECHO (TTE) LIMITED  Diagnosis/ICD: Other pericardial effusion (noninflammatory)-I31.39  Indication:    supected coronary perforation r/o pericardial efusion  CPT Code:      Echo Limited-19656; Doppler Limited-35551; Color Doppler-44103    Patient History:  Pertinent History: CAD.    Study Detail: The following Echo studies were performed: 2D, M-Mode, Doppler and  color flow.      PHYSICIAN INTERPRETATION:  Left Ventricle: The left ventricle was not well visualized. The left ventricular ejection fraction could not be measured. The left ventricular cavity size was not assessed. Left ventricular diastolic filling was not assessed.  Left Atrium: The left atrial size was not assessed.  Right Ventricle: The right ventricle was not assessed. There is normal right ventricular global systolic function.  Right Atrium: The right atrial size was not  assessed.  Aortic Valve: The aortic valve was not assessed. There is indeterminate aortic valve regurgitation.  Mitral Valve: The mitral valve was not well visualized. There is trace mitral valve regurgitation.  Tricuspid Valve: The tricuspid valve was not well visualized. Tricuspid regurgitation was not assessed.  Pulmonic Valve: The pulmonic valve was not assessed. Pulmonic valve regurgitation was not assessed.  Pericardium: Trivial pericardial effusion.  Aorta: The aortic root was not assessed.  In comparison to the previous echocardiogram(s): Compared with study dated 1/16/2025, the pericardial effusion is now trivial (only seen in systole) compared to none on prior study.      CONCLUSIONS:  1. The left ventricle was not well visualized. The left ventricular ejection fraction could not be measured.  2. There is normal right ventricular global systolic function.  3. Trivial pericardial effusion.  4. Compared with study dated 1/16/2025, the pericardial effusion is now trivial (only seen in systole) compared to none on prior study.    QUANTITATIVE DATA SUMMARY:    [id number] Romie Perera MD  Electronically signed on 2/11/2025 at 12:32:42 PM        ** Final **      Transthoracic Echo (TTE) Complete 01/16/2025    [place] [address]  Phone [phone] Fax [phone]    TRANSTHORACIC ECHOCARDIOGRAM REPORT    Patient Name:       STEPHAN FERGUSONIN        Reading Physician:    [id number] Niko Chairez DO  Study Date:         1/16/2025           Ordering Provider:    [id number] FÁTIMA JOHNS  MRN/PID:            [id number]            Fellow:  Accession#:         [id number]        Nurse:  Date of Birth/Age:  1945 / 79      Sonographer:          Marissa Zabala RDCS  years  Gender Assigned at  M                   Additional Staff:  Birth:  Height:             167.64 cm           Admit Date:           1/16/2025  Weight:             55.79 kg            Admission Status:      Outpatient  BSA / BMI:          1.63 m2 / 19.85     Department Location:  [facility] Echo  kg/m2                                     Lab  Blood Pressure: 106 /73 mmHg    Study Type:    TRANSTHORACIC ECHO (TTE) COMPLETE  Diagnosis/ICD: Atherosclerotic heart disease of native coronary artery without  angina pectoris-I25.10  Indication:    CAD  CPT Codes:     Echo Complete w Full Doppler-72051    Patient History:  Pertinent History: CAD.    Study Detail: The following Echo studies were performed: 2D, M-Mode, Doppler and  color flow.      PHYSICIAN INTERPRETATION:  Left Ventricle: The left ventricular systolic function is normal, with a calculated ejection fraction of 62% by auto EF. There are no regional wall motion abnormalities. The left ventricular cavity size is normal. There is normal septal and normal posterior left ventricular wall thickness. Spectral Doppler shows a normal pattern of left ventricular diastolic filling.  Left Atrium: The left atrium is normal in size.  Right Ventricle: The right ventricle is normal in size. There is normal right ventricular global systolic function.  Right Atrium: The right atrium is normal in size.  Aortic Valve: The aortic valve is trileaflet. There is moderate aortic valve cusp calcification. There is moderate aortic valve thickening. There is evidence of moderate aortic valve stenosis.  The aortic valve dimensionless index is 0.44. There is moderate aortic valve regurgitation. The peak instantaneous gradient of the aortic valve is 50 mmHg. The mean gradient of the aortic valve is 27 mmHg.  Mitral Valve: The mitral valve is normal in structure. There is mild mitral annular calcification. There is no evidence of mitral valve regurgitation.  Tricuspid Valve: The tricuspid valve is structurally normal. There is mild to moderate tricuspid regurgitation. The Doppler estimated RVSP is mildly elevated right ventricular systolic pressure at 40.2 mmHg. Small mobile echodensity seen  attached to the tricuspid valve. Recommend additional imaging such as YI for further evaluation.  Pulmonic Valve: The pulmonic valve is structurally normal. There is trace pulmonic valve regurgitation.  Pericardium: No pericardial effusion noted.  Aorta: The aortic root is normal.      CONCLUSIONS:  1. The left ventricular systolic function is normal, with a calculated ejection fraction of 62% by auto EF.  2. There is normal right ventricular global systolic function.  3. Mild to moderate tricuspid regurgitation.  4. Mildly elevated right ventricular systolic pressure.  5. Small mobile echodensity seen attached to the tricuspid valve. Recommend additional imaging such as YI for further evaluation.  6. Moderate aortic valve stenosis.  7. There is moderate aortic valve cusp calcification.  8. Moderate aortic valve regurgitation.    QUANTITATIVE DATA SUMMARY:    2D MEASUREMENTS:           Normal Ranges:  IVSd:            0.55 cm   (0.6-1.1cm)  LVPWd:           0.70 cm   (0.6-1.1cm)  LVIDd:           4.15 cm   (3.9-5.9cm)  LVIDs:           3.25 cm  LV Mass Index:   44.7 g/m2  LV % FS          21.7 %      LA VOLUME:                    Normal Ranges:  LA Vol A4C:        19.3 ml    (22+/-6mL/m2)  LA Vol A2C:        13.9 ml  LA Vol BP:         19.8 ml  LA Vol Index A4C:  11.9ml/m2  LA Vol Index A2C:  8.5 ml/m2  LA Vol Index BP:   12.2 ml/m2  LA Area A4C:       10.0 cm2  LA Area A2C:       7.0 cm2  LA Major Axis A4C: 4.4 cm  LA Major Axis A2C: 3.0 cm  LA Volume Index:   12.2 ml/m2  LA Vol A4C:        17.4 ml  LA Vol A2C:        13.1 ml  LA Vol Index BSA:  9.4 ml/m2      RA VOLUME BY A/L METHOD:          Normal Ranges:  RA Area A4C:             13.2 cm2      AORTA MEASUREMENTS:         Normal Ranges:  Ao Sinus, d:        3.10 cm (2.1-3.5cm)  Ao STJ, d:          2.70 cm (1.7-3.4cm)  Asc Ao, d:          2.70 cm (2.1-3.4cm)      LV SYSTOLIC FUNCTION BY 2D PLANIMETRY (MOD):  Normal Ranges:  EF-A4C View:    71 % (>=55%)  EF-A2C  View:    50 %  EF-Biplane:     62 %  EF-Auto:        62 %  LV EF Reported: 62 %      LV DIASTOLIC FUNCTION:           Normal Ranges:  MV Peak E:             0.78 m/s  (0.7-1.2 m/s)  MV Peak A:             1.15 m/s  (0.42-0.7 m/s)  E/A Ratio:             0.68      (1.0-2.2)  MV e'                  0.102 m/s (>8.0)  MV lateral e'          0.13 m/s  MV medial e'           0.08 m/s  E/e' Ratio:            7.65      (<8.0)      MITRAL VALVE:          Normal Ranges:  MV DT:        284 msec (150-240msec)      AORTIC VALVE:                      Normal Ranges:  AoV Vmax:                3.55 m/s  (<=1.7m/s)  AoV Peak P.5 mmHg (<20mmHg)  AoV Mean P.9 mmHg (1.7-11.5mmHg)  LVOT Max Silvio:            1.46 m/s  (<=1.1m/s)  AoV VTI:                 69.31 cm  (18-25cm)  LVOT VTI:                30.31 cm  LVOT Diameter:           1.99 cm   (1.8-2.4cm)  AoV Area, VTI:           1.36 cm2  (2.5-5.5cm2)  AoV Area,Vmax:           1.28 cm2  (2.5-4.5cm2)  AoV Dimensionless Index: 0.44      AORTIC INSUFFICIENCY:  AI Vmax:       4.23 m/s  AI Half-time:  401 msec  AI Decel Time: 1382 msec  AI Decel Rate: 317.85 cm/s2      RIGHT VENTRICLE:  RV Basal 2.82 cm  RV Mid   1.60 cm  RV Major 6.3 cm  TAPSE:   22.0 mm  RV s'    0.15 m/s      TRICUSPID VALVE/RVSP:          Normal Ranges:  Peak TR Velocity:     3.05 m/s  RV Syst Pressure:     40 mmHg  (< 30mmHg)  IVC Diam:             1.13 cm  TV e'                 0.1 m/s      AORTA:  Asc Ao Diam 2.74 cm      [id number] Niko Chairez DO  Electronically signed on 2025 at 3:01:18 PM        ** Final **       Ejection Fractions:  EF   Date/Time Value Ref Range Status   2025 04:53 PM 68 %        Cath:  PCI 2025    [facility], Cath Lab, [address] [place] [address]    Cardiovascular Catheterization Report    Patient Name:      MICKEY DAKIN         Performing Physician:  [id number] Carl Gillombardo MD  Study Date:        2025             Verifying Physician:   [id number] Carl Gillombardo MD  MRN/PID:           [id number]             Cardiologist/Co-Scrub:  Accession#:        [id number]         Ordering Provider:     [id number] CARL B GILLOMBARDO  Date of Birth/Age: 1945 / 79 years Cardiologist:  Gender:            M                    Fellow:                [id number] Juan Orozco MD  Encounter#:        [id number]           Surgeon:      Study:            PCI - Percutaneous Coronary Intervention  Additional Study: FFR - Fractional Flow Reserve  Additional Study: OCT - Optical Coherence Tomography  Additional Study: Left Heart Cath      Indications:  MICKEY DAKIN is a 79 year old male who presents with dyslipidemia, hypertension, chronic pulmonary disease and Tobacco Use - Current, Angina. MICKEY DAKIN is a 79 year old male who presents with coronary artery disease, aortic stenosis, chronic pulmonary disease, dyslipidemia, hypertension and a chest pain assessment of typical angina Angina. Worsening angina.    Appropriate Use Criteria:  Unstable angina with intermediate risk score; AUC score = 8.  Medical History:  Stress test performed: No. CTA performed: No. [place] accessed: No. LVEF  Assessed: Yes. LVEF = 55%.  Cardiac arrest: No.  Cardiac surgical consult: Completed - cardiac surgery not recommended.  Cardiovascular instability: No.  Frailty status of patient entering lab: 6 = Moderately frail.      Procedure Description:  After infiltration with 1% Lidocaine, the right radial artery was cannulated with a modified Seldinger technique. Subsequently a 6 [place] sheath was placed retrograde in the right radial artery. Additional catheter(s) used to visualize the coronary arteries were: EBU 3.5. Multiple injections of contrast were made into the left coronary arteries with angiograms recorded in multiple projections. After completion of the procedure, the arterial sheath was pulled and a TR Band Radial Compression Device was utilized to obtain patent  hemostasis.    Coronary Angiography:  The coronary circulation is right dominant.    Left Main Coronary Artery:  The left main coronary artery is a normal caliber vessel. The left main arises normally from the left coronary sinus of Valsalva and bifurcates into the LAD and circumflex coronary arteries. The left main coronary artery showed moderate atherosclerotic disease and calcification. The mid to distal left main coronary artery showed 30% stenosis.    Left Anterior Descending Coronary Artery Distribution:  The left anterior descending coronary artery is a normal caliber vessel. The LAD arises normally from the left main coronary artery. The LAD demonstrated moderate atherosclerotic disease and calcification. The mid left anterior descending coronary artery showed 60% stenosis.    Circumflex Coronary Artery Distribution:  The circumflex coronary artery is a normal caliber vessel. The circumflex arises normally from the left main coronary artery and terminates in the AV groove. The circumflex revealed moderate ostial atherosclerotic disease and severe calcification. The ostial circumflex coronary artery showed 70% stenosis. An additional lesion, located at the proximal circumflex coronary artery, demonstrated 90% stenosis. A third circumflex lesion, located at the distal circumflex coronary artery revealed 70% stenosis. The 1st obtuse marginal branch showed mild atherosclerotic disease. The entire 1st obtuse marginal branch showed 30% stenosis.    Right Coronary Artery Distribution:    The right coronary artery could not be successfully injected.    Coronary Interventions:  Angiography reveals a 90% stenosis of the ostial and proximal circumflex coronary artery. Pre-intervention KATE flow was 3. Percutaneous coronary intervention was performed within the ostial and proximal circumflex. The vessel was pre-dilated using a non-compliant balloon 2.0 mm x 15 mm at 20 AN. [place] 2.25 mm x 30 mm was advanced to  the lesion and implanted at 12 AN. A second Gurpreet McCormick 3.0 mm x 8 mm was implanted in overlap at 11 AN. The stent was post dilated using a non-compliant balloon 2.5 mm x 12 mm at 14-16 AN. The stenosis was successfully reduced from 90% to 0%. Post intervention Optical Coherence Tomography (OCT) was performed within the ostial and proximal circumflex . The stent demonstrated good apposition. No edge dissection is visualized. Post-intervention KATE flow was 3.    Heparin was administered and therapeutic ACT's were maintained for the entirety the procedure. We then engaged the ostium of the left main coronary artery using a 7 [place] EBU 3.5 guide catheter.    After obtaining diagnostic cine images from multiple fluoroscopic views, we then advanced a coronary pressure wire into the left main coronary artery, disengaged our guide and equalized pressures between the pressure wire and our manifold. We then advanced the coronary pressure wire into the distal segment of the left anterior descending artery with relative ease and obtained pressure recordings to adjudicate the intermediate lesion in the proximal to mid segment of the vessel. RFR for the LAD lesion was negative at a value of 0.92.    We then advanced a coronary run-through wire into the distal segment of the left circumflex artery, after which we advanced a [place] coronary wire into the ramus intermedius with relative ease.    We then predilated the lesion in the proximal segment of the left circumflex artery using a 2.0 mm by 15 mm noncompliant balloon which was serially inflated up to 20 an. We then attempted to advance a [employer] Alaris OCT imaging catheter into position, however became clear that we needed to further pretreat the calcified lesion in the proximal segment of the left circumflex artery. Informed by these findings, we then predilated the lesion using a 2.25 mm x 12 mm noncompliant balloon which was serially inflated up to 18 an. After  removing the 2.25 mm noncompliant balloon, we then advanced a 2.5 mm x 8 mm noncompliant balloon, with which we further predilated the lesion serially inflating up to 20 benita.    At this point in the case we obtained OCT imaging which confirmed our suspicion regarding lesion severity, further characterize the lesion as diffusely calcific, and confirmed that we had in fact cracked the critical lesion in the proximal segment of the vessel. Satisfied with our lesion preparation, after moving the OCT imaging catheter we then advanced a 2.25 mm x 30 mm [place] frontier drug-eluting stent, which was subsequently deployed at 12 benita. The stent was quickly postdilated using the 2.5 mm noncompliant balloon.    Immediately after stent deployment, the patient complained of new severe chest discomfort, there was significant ST segment depressions noted on telemetry, and there was clear staining of contrast within the left main coronary artery extending into the root of the aorta.    The patient became hypotensive with systolic blood pressures in the 50s, but never lost consciousness. We started low-dose norepinephrine drip which was uptitrated to 0.07 mcg, and gave multiple boluses of IV fluids totaling approximately 500 cc. 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 Informed by these findings we then attempted to seal the dissection off using a 3.0 mm x 8 mm Gurpreet frontier drug-eluting stent, which was subsequently deployed at 12 benita. We then postdilated the stent using a 3.25 mm and then 3.5 mm noncompliant balloons, which were both serially inflated up to 18 benita making sure to flare the stent in the ostium of the left main coronary artery.    Stenting of the left main coronary artery immediately stabilize the patient, chest pain completely resolved, hemodynamics  significantly improved to the point that norepinephrine drip was quickly down titrated and weaned off entirely, and ST segment changes on the monitor resolved.      We then obtained cine images from multiple fluoroscopic views which demonstrated KATE-3 flow in all coronary vascular territories.    He then removed our equipment in the usual fashion and hemostasis of the 7 [place] right radial artery access site was achieved using a single TR band.    Coronary Lesion Summary:  Vessel       Stenosis   Vessel Segment  Left Main  30% stenosis mid to distal  LAD        60% stenosis      mid  Circumflex 70% stenosis     ostial  Circumflex 90% stenosis    proximal  Circumflex 70% stenosis     distal  OM 1       30% stenosis     entire      Hemo Personnel:  +---------------------------+---------+  Name                       Duty       +---------------------------+---------+  Gillombardo, Carl Barton MDPROC MD 1  +---------------------------+---------+      Hemodynamic Pressures:    +----+---------------------+----------+-------------+--------------+---------+  Site      Date Time      Phase NameSystolic mmHgDiastolic mmHgMean mmHg  +----+---------------------+----------+-------------+--------------+---------+    AO 2/11/2025 9:09:47 AM  AIR REST          130            64       88  +----+---------------------+----------+-------------+--------------+---------+    AO 2/11/2025 9:26:33 AM  AIR REST          125            66       90  +----+---------------------+----------+-------------+--------------+---------+    AO 2/11/2025 9:55:28 AM  AIR REST          118            66       86  +----+---------------------+----------+-------------+--------------+---------+    AO2/11/2025 10:36:46 AM  AIR REST          110            58       77  +----+---------------------+----------+-------------+--------------+---------+    AO2/11/2025 11:35:30 AM  AIR REST           95            56        72  +----+---------------------+----------+-------------+--------------+---------+    AO2/11/2025 11:45:01 AM  AIR REST          116            60       80  +----+---------------------+----------+-------------+--------------+---------+    AO2/11/2025 11:46:54 AM  AIR REST          126            68       92  +----+---------------------+----------+-------------+--------------+---------+      Cardiac Cath Post Procedure Notes:  Post Procedure Diagnosis: S/p RFR evluation of LAD  s/p OCT guided PCI of LCX  s/p OCT guided PCI of LM.  Blood Loss:               Estimated blood loss during the procedure was 10 mls.  Specimens Removed:        Number of specimen(s) removed: none.      Recommendations:  Maximize medical therapy.  Agressive risk factor modification efforts.  CT surgical consultation to consider possible surgical options.  Consider referral to cardiac rehabilitation.    ____________________________________________________________________________________  CONCLUSIONS:  1. S/p pressure wire evaluation (RFR) of intermediate LAD lesion, which was negative at 0.92.  2. S/p OCT guided PCI to ostioproximal LCX using a 2.25mm x 30mm josiane frontier MARIBELL, postdilated to 2.5mm.  3. Case was complicated by retrograde dissection from ostium of LCX though the left main coronary artery and into the root of the aorta, necessitating the placement of a second drug-eluting stent (3.0 mm x 8 mm Josiane frontier drug-eluting stent, postdilated to 3.5 mm) in the left main coronary artery which successfully stabilized the patient.  4. DAPT with aspirin + clopidogrel.  5. Transfer to [facility] for close monitoring and serial TTE + CTA Aorta.    ICD 10 Codes:  Angina pectoris, unspecified-I20.9    CPT Codes:  Ultrasound guidance for vascular access-67042; Stent w angio ather Left Circumflex single major Artery branch (PCI)-42332.LC; Stent w angio ather Left Main single major Artery branch (PCI)-80152.LM; Left Heart Cath  (visualization of coronaries) and LV-45363; OCT Initial Vessel (coronary native vessel or graft) during diagnostic evaluation and/or therapeutic intervention, initial vessel-14800    [id number] Carl Gillombardo MD  Performing Physician  Electronically signed by [id number] Carl Gillombardo MD on 2/11/2025 at 5:17:09 PM          ** Final **        Stress Test:  Stress Results:  Regadenoson Stress Test With Myocardial Perfusion Spect (Multi Study) 11/29/2024    Narrative  Interpreted By:  Sandy Lima,  STUDY:  NUCLEAR STRESS TEST;  11/29/2024 12:53 pm    INDICATION:  Signs/Symptoms:AAA preop.    COMPARISON:  None.    ACCESSION NUMBER(S):  [id number]    ORDERING CLINICIAN:  RUBEN ELIZONDO    TECHNIQUE:  DIVISION OF NUCLEAR MEDICINE  PHARMACOLOGIC STRESS MYOCARDIAL PERFUSION SCAN, ONE DAY PROTOCOL    The patient received an intravenous dose of  11.2 mCi of Tc-99m  tetrofosmin and resting emission tomographic (SPECT) images of the  myocardium were acquired. The patient then received an intravenous  infusion of 0.4mg regadenoson (Lexiscan) followed by an additional  dose of  34.5 mCi of Tc-99m tetrofosmin. Stress phase SPECT images of  the myocardium were then acquired. These included ECG-gated images to  assess and quantify ventricular function.  In addition, low-dose CT  attenuation correction was applied.    FINDINGS:  Stress and rest images both demonstrate a medium-sized area of  predominantly fixed perfusion defect of the anteroseptal and  inferoseptal segments, mild in intensity.    ECG-gated images demonstrate normal LV size and myocardial  contractility with an LV ejection fraction of   58 % (normal above 50  percent). Rest gated ejection fraction is 71%. Summed stress score 9  summed rest score 6 summed difference score 3.    Impression  1. Medium-sized area of predominantly fixed perfusion defect of the  anteroseptal and inferoseptal segments, consistent with mixed  scar/ischemia in multivessel distribution.  2.  Well-maintained left ventricular function.  3. Low-dose non gated CT with severe coronary artery calcification  and thoracic aortic calcification. Mass versus pneumonia versus  scarring right anterior lung. Dedicated chest CT is recommended.  Emphysematous changes noted.        Signed by: Sandy Lima 11/29/2024 2:58 PM  Dictation workstation:   [id number]       Cardiac Imaging:  ECG 12 lead (Clinic Performed)  Sinus rhythm with sinus arrhythmia  Possible Anterior infarct , age undetermined  Nonspecific T wave abnormality  Abnormal ECG  When compared with ECG of 11-FEB-2025 15:02,  Premature atrial complexes are now Present  Vent. rate has decreased BY  37 BPM  Inverted T waves have replaced nonspecific T wave abnormality in Inferior leads  Confirmed by Bradley Phillip ([id number]) on 3/11/2025 7:31:02 PM      Assessment/Plan   Frail appearing 79M with complex cardiovascular and pulmonary disease who reports to clinic after difficult hospitalization as described above. He continues to make slow but steady progress as he recovers in a nursing facility near his home. He and his family have made the difficult decision to change his code status to DNR/DNI, which is reasonable given his overall frailty and multiple end stage cardiopulmonary and vascular comorbidities.     We will continue his medical management at this time, and have him follow up closely in clinic.     Please see detailed problem based assessment / plan below    # CAD s/p PCI  - continue asa /plavix / zetia     # Cardiovascular Health Maintenance  - Physical Activity: Encourage 10-15K steps per day  - Healthy Diet: Avoid high fat and high sodium foods (processed meats / fast foods)  --- consider a mediterranean or DASH diet, emphasizing vegetables, fruits, whole grains, lean proteins  - Avoidance of smoking and smoke exposure    Problem List Items Addressed This Visit             ICD-10-CM    Coronary artery disease involving native heart I25.10     COPD (chronic obstructive pulmonary disease) (Multi) J44.9    Moderate aortic stenosis - Primary I35.0    Relevant Orders    ECG 12 lead (Clinic Performed) (Completed)     Other Visit Diagnoses         Codes    History of coronary artery stent placement     Z95.5                Time Spent: I spent 60 minutes reviewing medical testing, obtaining medical history and counselling and educating on diagnosis and documenting clinical encounter.   C. Barton Gillombardo, MD  Interventional Cardiology  Pager: [phone]

## 2025-04-18 ENCOUNTER — APPOINTMENT (OUTPATIENT)
Dept: VASCULAR SURGERY | Facility: HOSPITAL | Age: 80
End: 2025-04-18
Payer: MEDICARE

## 2025-05-16 ENCOUNTER — APPOINTMENT (OUTPATIENT)
Dept: CARDIOLOGY | Facility: HOSPITAL | Age: 80
End: 2025-05-16
Payer: MEDICARE

## 2025-05-30 ENCOUNTER — APPOINTMENT (OUTPATIENT)
Dept: CARDIOLOGY | Facility: HOSPITAL | Age: 80
End: 2025-05-30
Payer: MEDICARE

## (undated) DEVICE — GUIDEWIRE, STRAIGHT, HI-TORQUE BALANCE MIDDLEWEIGHT, 0.014 IN X 190 CM, HYDROCOAT

## (undated) DEVICE — GUIDEWIRE, ASAHI PROWATER, .014/180CM, STRAIGHT"

## (undated) DEVICE — VALVE, HEMOSTASIS, GUARDIAN II NC, W/ GUIDEWIRE INSERTION TOOL

## (undated) DEVICE — CATHETER, BALLOON, NC EUPHORA NONCOMPLIANT 3.25 X 12 X 142CM

## (undated) DEVICE — GUIDEWIRE, HI-TORQUE, VERSACORE, 145CM, FLOPPY

## (undated) DEVICE — INTRODUCER, GLIDESHEATH SLENDER A-KIT, 6FR 10CM

## (undated) DEVICE — CATHETER, BALLOON, NC EUPHORA NONCOMPLIANT 3.5 X 12 X 142CM

## (undated) DEVICE — GUIDEWIRE, INQWIRE, 3MM J, .035, 260

## (undated) DEVICE — STENT ONYXNG30012UX ONYX 3.00X12RX
Type: IMPLANTABLE DEVICE | Status: NON-FUNCTIONAL
Brand: ONYX FRONTIER™

## (undated) DEVICE — TR BAND, RADIAL COMPRESSION, STANDARD, 24CM

## (undated) DEVICE — CATHETER, BALLOON DILATION, EUPHORA SEMICOMPLIANT 3.00  X 10 MM X 142CM

## (undated) DEVICE — CATHETER, BALLOON, NC EUPHORA NONCOMPLIANT 2.25 X 12 X 142CM

## (undated) DEVICE — GUIDEWIRE, HI-TORQUE, VERSACORE, 145CM, MODIFIED J

## (undated) DEVICE — CATHETER, BALLOON, NC EUPHORA NONCOMPLIANT 2.5 X 8 X 142CM

## (undated) DEVICE — CATHETER, BALLOON, NC EUPHORA NONCOMPLIANT 3.5 X 8 X 142CM

## (undated) DEVICE — CATHETER, DIAGNOSTIC, 4 FR-JR 4

## (undated) DEVICE — GUIDEWIRE, RUN THROUGH WIRE, 180CM

## (undated) DEVICE — CATHETER, BALLOON, NC EUPHORA NONCOMPLIANT 2.5 X 12 X 142CM

## (undated) DEVICE — CATHETER, ANGIO, IMPULSE, FR4, 5 FR X 100 CM

## (undated) DEVICE — CATHETER, DRAGONFLY, OPSTAR

## (undated) DEVICE — GUIDEWIRE, PRESSURE WIRE X , 175CM WIRELESS, AGILE TIP

## (undated) DEVICE — CATHETER, GUIDING, LAUNCHER, 7FR, EBU 3.5

## (undated) DEVICE — INFLATION KIT, ADVANTAGE, ENCORE 26 (1/BOX)

## (undated) DEVICE — CATHETER, BALLOON, NC EUPHORA NONCOMPLIANT 2.0 X 15 X 142CM

## (undated) DEVICE — CATHETER, ANGIO, IMPULSE, FL3.5, 5 FR X 100 CM

## (undated) DEVICE — INTRODUCER, GLIDESHEATH SLENDER A-KIT, 7FR 10CM